# Patient Record
Sex: MALE | Race: WHITE | Employment: UNEMPLOYED | URBAN - METROPOLITAN AREA
[De-identification: names, ages, dates, MRNs, and addresses within clinical notes are randomized per-mention and may not be internally consistent; named-entity substitution may affect disease eponyms.]

---

## 2017-01-20 ENCOUNTER — HOSPITAL ENCOUNTER (OUTPATIENT)
Dept: GENERAL RADIOLOGY | Age: 67
Discharge: HOME | End: 2017-01-20
Payer: MEDICARE

## 2017-01-20 DIAGNOSIS — N20.0 RENAL CALCULUS: ICD-10-CM

## 2017-01-20 PROCEDURE — 74000 XR ABD (KUB): CPT

## 2017-02-06 ENCOUNTER — ANESTHESIA EVENT (OUTPATIENT)
Dept: SURGERY | Age: 67
End: 2017-02-06
Payer: MEDICARE

## 2017-02-07 ENCOUNTER — APPOINTMENT (OUTPATIENT)
Dept: GENERAL RADIOLOGY | Age: 67
End: 2017-02-07
Attending: UROLOGY
Payer: MEDICARE

## 2017-02-07 ENCOUNTER — ANESTHESIA (OUTPATIENT)
Dept: SURGERY | Age: 67
End: 2017-02-07
Payer: MEDICARE

## 2017-02-07 ENCOUNTER — HOSPITAL ENCOUNTER (OUTPATIENT)
Age: 67
Setting detail: OUTPATIENT SURGERY
Discharge: HOME | End: 2017-02-07
Attending: UROLOGY | Admitting: UROLOGY
Payer: MEDICARE

## 2017-02-07 VITALS
DIASTOLIC BLOOD PRESSURE: 76 MMHG | RESPIRATION RATE: 11 BRPM | WEIGHT: 152 LBS | OXYGEN SATURATION: 100 % | HEIGHT: 72 IN | SYSTOLIC BLOOD PRESSURE: 148 MMHG | BODY MASS INDEX: 20.59 KG/M2 | HEART RATE: 51 BPM | TEMPERATURE: 97.5 F

## 2017-02-07 LAB
ATRIAL RATE: 75 BPM
CALCULATED P AXIS, ECG09: -2 DEGREES
CALCULATED R AXIS, ECG10: 33 DEGREES
CALCULATED T AXIS, ECG11: 32 DEGREES
DAILY QC (YES/NO)?: NORMAL
DIAGNOSIS, 93000: NORMAL
HGB BLD-MCNC: 13.2 G/DL (ref 12.1–17)
P-R INTERVAL, ECG05: 132 MS
Q-T INTERVAL, ECG07: 394 MS
QRS DURATION, ECG06: 82 MS
QTC CALCULATION (BEZET), ECG08: 439 MS
VENTRICULAR RATE, ECG03: 75 BPM

## 2017-02-07 PROCEDURE — 77030005520 HC CATH URETH FOL38 BARD -A: Performed by: UROLOGY

## 2017-02-07 PROCEDURE — 76210000016 HC OR PH I REC 1 TO 1.5 HR: Performed by: UROLOGY

## 2017-02-07 PROCEDURE — 74011000258 HC RX REV CODE- 258

## 2017-02-07 PROCEDURE — 74011250637 HC RX REV CODE- 250/637: Performed by: UROLOGY

## 2017-02-07 PROCEDURE — 74011250637 HC RX REV CODE- 250/637: Performed by: ANESTHESIOLOGY

## 2017-02-07 PROCEDURE — 76060000033 HC ANESTHESIA 1 TO 1.5 HR: Performed by: UROLOGY

## 2017-02-07 PROCEDURE — 77030018836 HC SOL IRR NACL ICUM -A: Performed by: UROLOGY

## 2017-02-07 PROCEDURE — 77030018832 HC SOL IRR H20 ICUM -A: Performed by: UROLOGY

## 2017-02-07 PROCEDURE — 76010000161 HC OR TIME 1 TO 1.5 HR INTENSV-TIER 1: Performed by: UROLOGY

## 2017-02-07 PROCEDURE — 77030020143 HC AIRWY LARYN INTUB CGAS -A: Performed by: NURSE ANESTHETIST, CERTIFIED REGISTERED

## 2017-02-07 PROCEDURE — 74011250636 HC RX REV CODE- 250/636

## 2017-02-07 PROCEDURE — 77030010539 HC BG LEG URIN BARD -A: Performed by: UROLOGY

## 2017-02-07 PROCEDURE — 77030020782 HC GWN BAIR PAWS FLX 3M -B

## 2017-02-07 PROCEDURE — 77030019948 HC FBR LSR HOLM DISP OCOA -E: Performed by: UROLOGY

## 2017-02-07 PROCEDURE — 74011250636 HC RX REV CODE- 250/636: Performed by: ANESTHESIOLOGY

## 2017-02-07 PROCEDURE — 74011250636 HC RX REV CODE- 250/636: Performed by: UROLOGY

## 2017-02-07 PROCEDURE — 77030019927 HC TBNG IRR CYSTO BAXT -A: Performed by: UROLOGY

## 2017-02-07 PROCEDURE — 74011000258 HC RX REV CODE- 258: Performed by: UROLOGY

## 2017-02-07 PROCEDURE — 93005 ELECTROCARDIOGRAM TRACING: CPT

## 2017-02-07 PROCEDURE — 85018 HEMOGLOBIN: CPT | Performed by: UROLOGY

## 2017-02-07 PROCEDURE — 76210000020 HC REC RM PH II FIRST 0.5 HR: Performed by: UROLOGY

## 2017-02-07 RX ORDER — OXYCODONE AND ACETAMINOPHEN 5; 325 MG/1; MG/1
1-2 TABLET ORAL
Qty: 40 TAB | Refills: 0 | Status: SHIPPED | OUTPATIENT
Start: 2017-02-07 | End: 2018-10-23

## 2017-02-07 RX ORDER — FENTANYL CITRATE 50 UG/ML
50 INJECTION, SOLUTION INTRAMUSCULAR; INTRAVENOUS AS NEEDED
Status: DISCONTINUED | OUTPATIENT
Start: 2017-02-07 | End: 2017-02-07 | Stop reason: HOSPADM

## 2017-02-07 RX ORDER — HYDROMORPHONE HYDROCHLORIDE 1 MG/ML
1 INJECTION, SOLUTION INTRAMUSCULAR; INTRAVENOUS; SUBCUTANEOUS
Status: DISCONTINUED | OUTPATIENT
Start: 2017-02-07 | End: 2017-02-07 | Stop reason: HOSPADM

## 2017-02-07 RX ORDER — ONDANSETRON 2 MG/ML
INJECTION INTRAMUSCULAR; INTRAVENOUS AS NEEDED
Status: DISCONTINUED | OUTPATIENT
Start: 2017-02-07 | End: 2017-02-07 | Stop reason: HOSPADM

## 2017-02-07 RX ORDER — MIDAZOLAM HYDROCHLORIDE 1 MG/ML
1 INJECTION, SOLUTION INTRAMUSCULAR; INTRAVENOUS AS NEEDED
Status: DISCONTINUED | OUTPATIENT
Start: 2017-02-07 | End: 2017-02-07 | Stop reason: HOSPADM

## 2017-02-07 RX ORDER — ALBUTEROL SULFATE 0.83 MG/ML
2.5 SOLUTION RESPIRATORY (INHALATION) AS NEEDED
Status: DISCONTINUED | OUTPATIENT
Start: 2017-02-07 | End: 2017-02-07 | Stop reason: HOSPADM

## 2017-02-07 RX ORDER — PROMETHAZINE HYDROCHLORIDE 12.5 MG/1
25 TABLET ORAL
Qty: 10 TAB | Refills: 0 | Status: SHIPPED | OUTPATIENT
Start: 2017-02-07 | End: 2017-02-17

## 2017-02-07 RX ORDER — SODIUM CHLORIDE, SODIUM LACTATE, POTASSIUM CHLORIDE, CALCIUM CHLORIDE 600; 310; 30; 20 MG/100ML; MG/100ML; MG/100ML; MG/100ML
25 INJECTION, SOLUTION INTRAVENOUS CONTINUOUS
Status: DISCONTINUED | OUTPATIENT
Start: 2017-02-07 | End: 2017-02-07 | Stop reason: HOSPADM

## 2017-02-07 RX ORDER — PROPOFOL 10 MG/ML
INJECTION, EMULSION INTRAVENOUS AS NEEDED
Status: DISCONTINUED | OUTPATIENT
Start: 2017-02-07 | End: 2017-02-07 | Stop reason: HOSPADM

## 2017-02-07 RX ORDER — SODIUM CHLORIDE, SODIUM LACTATE, POTASSIUM CHLORIDE, CALCIUM CHLORIDE 600; 310; 30; 20 MG/100ML; MG/100ML; MG/100ML; MG/100ML
100 INJECTION, SOLUTION INTRAVENOUS CONTINUOUS
Status: DISCONTINUED | OUTPATIENT
Start: 2017-02-07 | End: 2017-02-07 | Stop reason: HOSPADM

## 2017-02-07 RX ORDER — MIDAZOLAM HYDROCHLORIDE 1 MG/ML
INJECTION, SOLUTION INTRAMUSCULAR; INTRAVENOUS AS NEEDED
Status: DISCONTINUED | OUTPATIENT
Start: 2017-02-07 | End: 2017-02-07 | Stop reason: HOSPADM

## 2017-02-07 RX ORDER — HYDROMORPHONE HYDROCHLORIDE 1 MG/ML
.2-1 INJECTION, SOLUTION INTRAMUSCULAR; INTRAVENOUS; SUBCUTANEOUS
Status: DISCONTINUED | OUTPATIENT
Start: 2017-02-07 | End: 2017-02-07 | Stop reason: HOSPADM

## 2017-02-07 RX ORDER — SODIUM CHLORIDE, SODIUM LACTATE, POTASSIUM CHLORIDE, CALCIUM CHLORIDE 600; 310; 30; 20 MG/100ML; MG/100ML; MG/100ML; MG/100ML
125 INJECTION, SOLUTION INTRAVENOUS CONTINUOUS
Status: DISCONTINUED | OUTPATIENT
Start: 2017-02-07 | End: 2017-02-07 | Stop reason: HOSPADM

## 2017-02-07 RX ORDER — PHENAZOPYRIDINE HYDROCHLORIDE 100 MG/1
100 TABLET, FILM COATED ORAL
Qty: 30 TAB | Refills: 1 | Status: SHIPPED | OUTPATIENT
Start: 2017-02-07 | End: 2017-02-17

## 2017-02-07 RX ORDER — SODIUM CHLORIDE 0.9 % (FLUSH) 0.9 %
5-10 SYRINGE (ML) INJECTION AS NEEDED
Status: DISCONTINUED | OUTPATIENT
Start: 2017-02-07 | End: 2017-02-07 | Stop reason: HOSPADM

## 2017-02-07 RX ORDER — CIPROFLOXACIN 500 MG/1
500 TABLET ORAL 2 TIMES DAILY
Qty: 10 TAB | Refills: 0 | Status: SHIPPED | OUTPATIENT
Start: 2017-02-07 | End: 2017-02-17

## 2017-02-07 RX ORDER — BACLOFEN 10 MG/1
10 TABLET ORAL 3 TIMES DAILY
COMMUNITY
End: 2018-10-23 | Stop reason: SDUPTHER

## 2017-02-07 RX ORDER — FENTANYL CITRATE 50 UG/ML
INJECTION, SOLUTION INTRAMUSCULAR; INTRAVENOUS AS NEEDED
Status: DISCONTINUED | OUTPATIENT
Start: 2017-02-07 | End: 2017-02-07 | Stop reason: HOSPADM

## 2017-02-07 RX ORDER — HYDROCODONE BITARTRATE AND ACETAMINOPHEN 5; 325 MG/1; MG/1
1 TABLET ORAL AS NEEDED
Status: DISCONTINUED | OUTPATIENT
Start: 2017-02-07 | End: 2017-02-07 | Stop reason: HOSPADM

## 2017-02-07 RX ORDER — ONDANSETRON 2 MG/ML
4 INJECTION INTRAMUSCULAR; INTRAVENOUS AS NEEDED
Status: DISCONTINUED | OUTPATIENT
Start: 2017-02-07 | End: 2017-02-07 | Stop reason: HOSPADM

## 2017-02-07 RX ORDER — DIPHENHYDRAMINE HYDROCHLORIDE 50 MG/ML
12.5 INJECTION, SOLUTION INTRAMUSCULAR; INTRAVENOUS AS NEEDED
Status: DISCONTINUED | OUTPATIENT
Start: 2017-02-07 | End: 2017-02-07 | Stop reason: HOSPADM

## 2017-02-07 RX ORDER — LIDOCAINE HYDROCHLORIDE 10 MG/ML
0.1 INJECTION, SOLUTION EPIDURAL; INFILTRATION; INTRACAUDAL; PERINEURAL AS NEEDED
Status: DISCONTINUED | OUTPATIENT
Start: 2017-02-07 | End: 2017-02-07 | Stop reason: HOSPADM

## 2017-02-07 RX ORDER — PHENAZOPYRIDINE HYDROCHLORIDE 100 MG/1
100 TABLET, FILM COATED ORAL ONCE
Status: COMPLETED | OUTPATIENT
Start: 2017-02-07 | End: 2017-02-07

## 2017-02-07 RX ORDER — GENTAMICIN SULFATE 80 MG/100ML
INJECTION, SOLUTION INTRAVENOUS AS NEEDED
Status: DISCONTINUED | OUTPATIENT
Start: 2017-02-07 | End: 2017-02-07 | Stop reason: HOSPADM

## 2017-02-07 RX ORDER — SODIUM CHLORIDE, SODIUM LACTATE, POTASSIUM CHLORIDE, CALCIUM CHLORIDE 600; 310; 30; 20 MG/100ML; MG/100ML; MG/100ML; MG/100ML
150 INJECTION, SOLUTION INTRAVENOUS CONTINUOUS
Status: DISCONTINUED | OUTPATIENT
Start: 2017-02-07 | End: 2017-02-07 | Stop reason: HOSPADM

## 2017-02-07 RX ADMIN — CEFTRIAXONE 1 G: 1 INJECTION, POWDER, FOR SOLUTION INTRAMUSCULAR; INTRAVENOUS at 14:19

## 2017-02-07 RX ADMIN — ONDANSETRON 4 MG: 2 INJECTION INTRAMUSCULAR; INTRAVENOUS at 14:43

## 2017-02-07 RX ADMIN — SODIUM CHLORIDE, POTASSIUM CHLORIDE, SODIUM LACTATE AND CALCIUM CHLORIDE: 600; 310; 30; 20 INJECTION, SOLUTION INTRAVENOUS at 13:27

## 2017-02-07 RX ADMIN — MIDAZOLAM HYDROCHLORIDE 2 MG: 1 INJECTION, SOLUTION INTRAMUSCULAR; INTRAVENOUS at 13:58

## 2017-02-07 RX ADMIN — FENTANYL CITRATE 25 MCG: 50 INJECTION, SOLUTION INTRAMUSCULAR; INTRAVENOUS at 14:36

## 2017-02-07 RX ADMIN — FENTANYL CITRATE 25 MCG: 50 INJECTION, SOLUTION INTRAMUSCULAR; INTRAVENOUS at 14:21

## 2017-02-07 RX ADMIN — GENTAMICIN SULFATE 80 MG: 80 INJECTION, SOLUTION INTRAVENOUS at 14:31

## 2017-02-07 RX ADMIN — FENTANYL CITRATE 25 MCG: 50 INJECTION, SOLUTION INTRAMUSCULAR; INTRAVENOUS at 13:58

## 2017-02-07 RX ADMIN — PHENAZOPYRIDINE HYDROCHLORIDE 100 MG: 100 TABLET ORAL at 15:50

## 2017-02-07 RX ADMIN — HYDROCODONE BITARTRATE AND ACETAMINOPHEN 1 TABLET: 5; 325 TABLET ORAL at 15:50

## 2017-02-07 RX ADMIN — PROPOFOL 130 MG: 10 INJECTION, EMULSION INTRAVENOUS at 14:06

## 2017-02-07 RX ADMIN — SODIUM CHLORIDE, POTASSIUM CHLORIDE, SODIUM LACTATE AND CALCIUM CHLORIDE: 600; 310; 30; 20 INJECTION, SOLUTION INTRAVENOUS at 14:20

## 2017-02-07 NOTE — ANESTHESIA PREPROCEDURE EVALUATION
Anesthetic History   No history of anesthetic complications       Comments: H/o trach, decannulated in 1978, denies airway issues with any anesthetics     Review of Systems / Medical History  Patient summary reviewed, nursing notes reviewed and pertinent labs reviewed    Pulmonary  Within defined limits                 Neuro/Psych         Neuromuscular disease    Comments: Quadriplegia w/ full motor deficit in all extremities, sensation intact. Able to ventilate without support or O2 Cardiovascular                Pertinent negatives: No past MI and cardiac stents  Exercise tolerance: <4 METS  Comments: No cardiac history, EKG NSR   GI/Hepatic/Renal             Pertinent negatives: No GERD   Endo/Other             Other Findings   Comments: H/o nephrolithiasis           Physical Exam    Airway  Mallampati: III  TM Distance: 4 - 6 cm  Neck ROM: normal range of motion   Mouth opening: Normal    Comments: Retrognathic, appears anterior Cardiovascular    Rhythm: regular  Rate: normal      Pertinent negatives: No murmur   Dental    Dentition: Bridges  Comments: Upper bridge   Pulmonary  Breath sounds clear to auscultation               Abdominal  GI exam deferred       Other Findings            Anesthetic Plan    ASA: 3  Anesthesia type: general          Induction: Intravenous  Anesthetic plan and risks discussed with: Patient and Family      R/b/o of GA discussed with pt & family. ?s answered, Consent signed. Difficult PIV access. OK to proceed w/ Hemeaccue value only.  Edgar Patricia MD

## 2017-02-07 NOTE — BRIEF OP NOTE
BRIEF OPERATIVE NOTE    Date of Procedure: 2/7/2017   Preoperative Diagnosis: BLADDER STONE   Postoperative Diagnosis: BLADDER STONE     Procedure(s):  CYSTOSCOPY LITHOLAPAXY WITH HOLMIUM LASER   Surgeon(s) and Role:     * Johanne Patten MD - Primary            Surgical Staff:  Circ-1: Jamila Sosa RN  Scrub Tech-1: Towner Rotunda  Event Time In   Incision Start 1432   Incision Close 1446     Anesthesia: General   Estimated Blood Loss: < 50cc  Specimens: * No specimens in log *   Findings: 4X5 cm bladder stone   Complications: none  Implants: * No implants in log *

## 2017-02-07 NOTE — INTERVAL H&P NOTE
H&P Update:  Joann Colunga was seen and examined. History and physical has been reviewed. The patient has been examined.  There have been no significant clinical changes since the completion of the originally dated History and Physical.    Signed By: Haris Aviles MD     February 7, 2017 1:26 PM
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
## 2017-02-07 NOTE — ANESTHESIA POSTPROCEDURE EVALUATION
Post-Anesthesia Evaluation and Assessment    Patient: Tate Sanchez MRN: [id number]  SSN: [id number]    YOB: 1950  Age: 61 y.o. Sex: male       Cardiovascular Function/Vital Signs  Visit Vitals    /83    Pulse (!) 56    Temp 36.5 °C (97.7 °F)    Resp 15    Ht 6' (1.829 m)    Wt 68.9 kg (152 lb)    SpO2 100%    BMI 20.61 kg/m2       Patient is status post general anesthesia for Procedure(s):  CYSTOSCOPY LITHOLAPAXY WITH HOLMIUM LASER . Nausea/Vomiting: None    Postoperative hydration reviewed and adequate. Pain:  Pain Scale 1: Numeric (0 - 10) (02/07/17 0948)  Pain Intensity 1: 0 (02/07/17 0948)   Managed    Neurological Status:   Neuro (WDL): Exceptions to WDL (02/07/17 4022)  Neuro  Neurologic State: Alert (02/07/17 5235)  Orientation Level: Appropriate for age (02/07/17 2263)  Speech: Mouths words (02/07/17 0958)  LUE Motor Response: Tremorous (02/07/17 0958)  RUE Motor Response: Tremorous (02/07/17 0958)   At baseline    Mental Status and Level of Consciousness: Alert and oriented     Pulmonary Status:   O2 Device: Nasal cannula (02/07/17 1510)   Adequate oxygenation and airway patent    Complications related to anesthesia: None    Post-anesthesia assessment completed.  No concerns    Signed By: Reji Soliman DO     February 7, 2017
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
## 2017-02-07 NOTE — PROGRESS NOTES
Anesthesia started PIV x1 attempt, left upper arm #20. IV site became infiltrated, PIV D/C'd, warm compress applied, radial pulse palpable.

## 2017-02-07 NOTE — PERIOP NOTES
Post op discharge instructions reviewed and given to  wife. Verbalized understanding and compliance. Wife advised pt given dose of pyridium and lortab. Made aware urine will be discolored orange from medication (pyridium).

## 2017-02-07 NOTE — IP AVS SNAPSHOT
Shannon Glez [address] [facility] 
[phone] Patient: Hosea Jernigan MRN: [id number] :1950 You are allergic to the following No active allergies Recent Documentation Height Weight BMI Smoking Status 1.829 m 68.9 kg 20.61 kg/m2 Former Smoker Unresulted Labs Order Current Status POC HEMOGLOBIN Preliminary result Emergency Contacts Name Discharge Info Relation Home Work Mobile Marisela Saunders DISCHARGE CAREGIVER [3] Spouse [3] [phone] Luann Vegaecca DISCHARGE CAREGIVER [3] Daughter [21] [phone] About your hospitalization You were admitted on:  2017 You last received care in the:  [facility] PACU You were discharged on:  2017 Unit phone number:  [phone] Why you were hospitalized Your primary diagnosis was:  Not on File Providers Seen During Your Hospitalizations Provider Role Specialty Primary office phone Rene Sanches MD Attending Provider Urology [phone] Your Primary Care Physician (PCP) Primary Care Physician Office Phone Office Fax Carmella Randall [phone] Follow-up Information Follow up With Details Comments Contact Info Margarito Wright MD   [address] 
[phone] Current Discharge Medication List  
  
START taking these medications Dose & Instructions Dispensing Information Comments Morning Noon Evening Bedtime  
 ciprofloxacin HCl 500 mg tablet Commonly known as:  CIPRO Your next dose is: Today, Tomorrow Other:  _________ Dose:  500 mg Take 1 Tab by mouth two (2) times a day for 10 days. Quantity:  10 Tab Refills:  0  
     
   
   
   
  
 citric ac-gluconolact-mag carb 6.602-3.268 gram/100 mL Soln Commonly known as:  RENACIDIN  
 Start taking on:  2/9/2017 Your next dose is: Today, Tomorrow Other:  _________ Dose:  1 Bottle 1 Bottle by Irrigation route every Monday and Thursday for 90 days. Quantity:  24 Bottle Refills:  3  
     
   
   
   
  
 oxyCODONE-acetaminophen 5-325 mg per tablet Commonly known as:  PERCOCET Your next dose is: Today, Tomorrow Other:  _________ Dose:  1-2 Tab Take 1-2 Tabs by mouth every four (4) hours as needed for Pain. Max Daily Amount: 12 Tabs. Quantity:  40 Tab Refills:  0 phenazopyridine 100 mg tablet Commonly known as:  PYRIDIUM Your next dose is: Today, Tomorrow Other:  _________ Dose:  100 mg Take 1 Tab by mouth three (3) times daily as needed (burning) for up to 10 days. Quantity:  30 Tab Refills:  1  
     
   
   
   
  
 promethazine 12.5 mg tablet Commonly known as:  PHENERGAN Your next dose is: Today, Tomorrow Other:  _________ Dose:  25 mg Take 2 Tabs by mouth every six (6) hours as needed for Nausea for up to 10 days. Quantity:  10 Tab Refills:  0 CONTINUE these medications which have NOT CHANGED Dose & Instructions Dispensing Information Comments Morning Noon Evening Bedtime  
 baclofen 10 mg tablet Commonly known as:  LIORESAL Your next dose is: Today, Tomorrow Other:  _________ Dose:  10 mg Take 10 mg by mouth three (3) times daily. Refills:  0  
     
   
   
   
  
 OXYBUTYNIN CHLORIDE PO Your next dose is: Today, Tomorrow Other:  _________ Take  by mouth three (3) times daily (with meals). Refills:  0 Where to Get Your Medications Information on where to get these meds will be given to you by the nurse or doctor. ! Ask your nurse or doctor about these medications  
  ciprofloxacin HCl 500 mg tablet citric ac-gluconolact-mag carb 6.602-3.268 gram/100 mL Soln  
 oxyCODONE-acetaminophen 5-325 mg per tablet  
 phenazopyridine 100 mg tablet  
 promethazine 12.5 mg tablet Discharge Instructions   
  
[place] 
urology [address] [facility] p [phone] 
[facility] [address]  [phone] [facility] Catheter Care ? The catheter draining the bladder has a water-filled balloon inside which prevents it from falling out. The catheter will not fall out on its own. 
? Bleeding may occur from time-to-time as the catheter may scrape along the bladder. It is important to drink enough fluid to keep urine flowing and clear out the catheter. Do not be alarmed by blood in the catheter bag. 
? The bladder may sense that there is something in it giving the feeling of needing to urinate. This may result in contractions of the bladder muscle, bladder spasms.   These spasms may result in: o A pressure sensation in the pelvis / bladder. o Bleeding. o Urine leaking around the side of the catheter. Generally, these issues are fairly minimal and well-tolerated by most patients. If these issues become overwhelming, contact the office during regular hours. Care of the Bag ? The leg bag is secured with two latex straps. To prevent problems with these straps: 
o Make sure that they are not too tight. 
o Switch legs each day. 
o The straps should go behind rather than over the bag to allow urine to flow into the bag without obstruction. ? The catheter should not be pulled on. As the bag fills, its weight will pull on the bag. To prevent this, it is best to keep the bag over the knee. The catheter should be loose, but not kinked. ? You may change the leg bag to a gravity bag at night. This is a bag with longer tubing that allows it to hang over the side of the bed at night. Be careful to make sure the bag does not pull on the catheter. ? The bag may be cleaned with water and vinegar as needed. You may shower with the bag. 
 
 
[place] 
urology [address] p [phone] 
[place] [id number], [address]  [phone] PATIENT INSTRUCTIONS: 
 
After general anesthesia or intravenous sedation, for 24 hours or while taking prescription Narcotics: · Limit your activities · Do not drive and operate hazardous machinery · Do not make important personal or business decisions · Do not drink alcoholic beverages Please contact [facility] Urology at [phone] if: · Temperature over 101 · Vomiting / Unable to tolerate liquids · Severe pain not responsive to pain medications · Unable to urinate for 6-8 hours DISCHARGE SUMMARY from your Nurse The following personal items collected during your admission are returned to you:  
Dental Appliance: Dental Appliances: None Vision: Visual Aid: None Hearing Aid:   
Jewelry: Jewelry: Ring, Sent home (given to wife) Clothing: Clothing: Other (comment) (street clothes, to locker) Other Valuables: Other Valuables: Wheelchair (to locker) Valuables sent to safe:   
 
PATIENT INSTRUCTIONS: 
 
After general anesthesia or intravenous sedation, for 24 hours or while taking prescription Narcotics: · Limit your activities · Do not drive and operate hazardous machinery · Do not make important personal or business decisions · Do  not drink alcoholic beverages · If you have not urinated within 8 hours after discharge, please contact your surgeon on call. Report the following to your surgeon: 
· Excessive pain, swelling, redness or odor of or around the surgical area · Temperature over 100.5 · Nausea and vomiting lasting longer than 4 hours or if unable to take medications · Any signs of decreased circulation or nerve impairment to extremity: change in color, persistent  numbness, tingling, coldness or increase pain · Any questions [address] [facility] Breathing deep and coughing are very important exercises to do after surgery. Deep breathing and coughing open the little air tubes and air sacks in your lungs. You take deep breaths every day. You may not even notice - it is just something you do when you sigh or yawn. It is a natural exercise you do to keep these air passages open. After surgery, take deep breaths and cough, on purpose. Coughing and deep breathing help prevent bronchitis and pneumonia after surgery. If you had chest or belly surgery, use a pillow as a \"hug nallely\" and hold it tightly to your chest or belly when you cough. DIRECTIONS: 
10. Take 10 to 15 slow deep breaths every hour while awake. 11. Breathe in deeply, and hold it for 2 seconds. 12. Exhale slowly through puckered lips, like blowing up a balloon. 13. After every 4th or 5th deep breath, hug your pillow to your chest or belly and give a hard, deep cough. Yes, it will probably hurt. But doing this exercise is very important part of healing after surgery. Take your pain medicine to help you do this exercise without too much pain. IF YOU HAVE BEEN DIAGNOSED WITH SLEEP APNEA, PLEASE USE YOUR SLEEIFP APNEA DEVICE OR CPAP MACHINE WHEN YOU INTEND TO NAP AFTER TAKING PAIN MEDICATION. Ankle Pumps Ankle pumps increase the circulation of oxygenated blood to your lower extremities and decrease your risk for circulation problems such as blood clots. They also stretch the muscles, tendons and ligaments in your foot and ankle, and prevent joint contracture in the ankle and foot, especially after surgeries on the legs. It is important to do ankle pump exercises regularly after surgery because immobility increases your risk for developing a blood clot. Your doctor may also have you take an Aspirin for the next few days as well. If your doctor did not ask you to take an Aspirin, consult with him before starting Aspirin therapy on your own. Slowly point your foot forward, feeling the muscles on the top of your lower leg stretch, and hold this position for 5 seconds. Next, pull your foot back toward you as far as possible, stretching the calf muscles, and hold that position for 5 seconds. Repeat with the other foot. Perform 10 repetitions every hour while awake for both ankles if possible (down and then up with the foot once is one repetition). You should feel gentle stretching of the muscles in your lower leg when doing this exercise. If you feel pain, or your range of motion is limited, don't  Push too hard. Only go the limit your joint and muscles will let you go. If you have increasing pain, progressively worsening leg warmth or swelling, STOP the exercise and call your doctor. Below is information about the medications your doctor is prescribing after your visit: 
 
Other information in your discharge envelope: PRESCRIPTIONS PHYSICAL THERAPY PRESCRIPTION 
     APPOINTMENT CARDS [facility] Pamphlet for block or block with On-Q Catheter from [facility] Medical device information sheets/pamphlets from their  School/work excuse note. /parent work excuse note. These are general instructions for a healthy lifestyle: *  Please give a list of your current medications to your Primary Care Provider. *  Please update this list whenever your medications are discontinued, doses are 
    changed, or new medications (including over-the-counter products) are added. *  Please carry medication information at all times in case of emergency situations. About Smoking No smoking / No tobacco products / Avoid exposure to second hand smoke Surgeon General's Warning:  Quitting smoking now greatly reduces serious risk to your health.  
 
Obesity, smoking, and sedentary lifestyle greatly increases your risk for illness and disease. A healthy diet, regular physical exercise & weight monitoring are important for maintaining a healthy lifestyle. Congestive Heart Failure You may be retaining fluid if you have a history of heart failure or if you experience any of the following symptoms:  Weight gain of 3 pounds or more overnight or 5 pounds in a week, increased swelling in our hands or feet or shortness of breath while lying flat in bed. Please call your doctor as soon as you notice any of these symptoms; do not wait until your next office visit. Recognize signs and symptoms of STROKE: 
F - face looks uneven A - arms unable to move or move even S - speech slurred or non-existent T - time-call 911 as soon as signs and symptoms begin-DO NOT go Back to bed or wait to see if you get better-TIME IS BRAIN. Warning signs of HEART ATTACK Call 911 if you have these symptoms · Chest discomfort. Most heart attacks involve discomfort in the center of the chest that lasts more than a few minutes, or that goes away and comes back. It can feel like uncomfortable pressure, squeezing, fullness, or pain. · Discomfort in other areas of the upper body. Symptoms can include pain or discomfort in one or both Arms, the back, neck, jaw, or stomach. ·  Shortness of breath with or without chest discomfort. · Other signs may include breaking out in a cold sweat, nausea, or lightheadedness Don't wait more than five minutes to call 911 - MINUTES MATTER! Fast action can save your life. Calling 911 is almost always the fastest way to get lifesaving treatment. Emergency Medical Services staff can begin treatment when they arrive - up to an hour sooner than if someone gets to the hospital by car. [facility] MEDICATION AND SIDE EFFECT GUIDE The [address] [facility] EFFECT GUIDE was provided to the PATIENT AND CARE PROVIDER.   Information provided includes instruction about drug purpose and common side effects for the following medications: · Discharge Orders None General Information Please provide this summary of care documentation to your next provider. Patient Signature:  ____________________________________________________________ Date:  ____________________________________________________________  
  
Cory Naas Provider Signature:  ____________________________________________________________ Date:  ____________________________________________________________

## 2017-02-07 NOTE — DISCHARGE INSTRUCTIONS
[place]  urology  [phone] [employer] p [phone]  [place] [address]  f  [phone]    [facility] Catheter Care   The catheter draining the bladder has a water-filled balloon inside which prevents it from falling out. The catheter will not fall out on its own.  Bleeding may occur from time-to-time as the catheter may scrape along the bladder. It is important to drink enough fluid to keep urine flowing and clear out the catheter. Do not be alarmed by blood in the catheter bag.  The bladder may sense that there is something in it giving the feeling of needing to urinate. This may result in contractions of the bladder muscle, bladder spasms.   These spasms may result in:  o A pressure sensation in the pelvis / bladder. o Bleeding. o Urine leaking around the side of the catheter. Generally, these issues are fairly minimal and well-tolerated by most patients. If these issues become overwhelming, contact the office during regular hours. Care of the Bag   The leg bag is secured with two latex straps. To prevent problems with these straps:  o Make sure that they are not too tight.  o Switch legs each day.  o The straps should go behind rather than over the bag to allow urine to flow into the bag without obstruction.  The catheter should not be pulled on. As the bag fills, its weight will pull on the bag. To prevent this, it is best to keep the bag over the knee. The catheter should be loose, but not kinked.  You may change the leg bag to a gravity bag at night. This is a bag with longer tubing that allows it to hang over the side of the bed at night. Be careful to make sure the bag does not pull on the catheter.  The bag may be cleaned with water and vinegar as needed.   You may shower with the bag.      [facility]  [address] [facility] [address] [phone]  [place] [address]  f  [phone]    PATIENT INSTRUCTIONS:    After general anesthesia or intravenous sedation, for 24 hours or while taking prescription Narcotics:  · Limit your activities  · Do not drive and operate hazardous machinery  · Do not make important personal or business decisions  · Do not drink alcoholic beverages      Please contact [address] [facility] Urology at [phone] if:  · Temperature over 101  · Vomiting / Unable to tolerate liquids  · Severe pain not responsive to pain medications  · Unable to urinate for 6-8 hours    DISCHARGE SUMMARY from your Nurse    The following personal items collected during your admission are returned to you:   Dental Appliance: Dental Appliances: None  Vision: Visual Aid: None  Hearing Aid:    Jewelry: Jewelry: Ring, Sent home (given to wife)  Clothing: Clothing: Other (comment) (street clothes, to locker)  Other Valuables: Other Valuables: Wheelchair (to locker)  Valuables sent to safe:      PATIENT INSTRUCTIONS:    After general anesthesia or intravenous sedation, for 24 hours or while taking prescription Narcotics:  · Limit your activities  · Do not drive and operate hazardous machinery  · Do not make important personal or business decisions  · Do  not drink alcoholic beverages  · If you have not urinated within 8 hours after discharge, please contact your surgeon on call. Report the following to your surgeon:  · Excessive pain, swelling, redness or odor of or around the surgical area  · Temperature over 100.5  · Nausea and vomiting lasting longer than 4 hours or if unable to take medications  · Any signs of decreased circulation or nerve impairment to extremity: change in color, persistent  numbness, tingling, coldness or increase pain  · Any questions    COUGH AND DEEP BREATHE    Breathing deep and coughing are very important exercises to do after surgery. Deep breathing and coughing open the little air tubes and air sacks in your lungs. You take deep breaths every day. You may not even notice - it is just something you do when you sigh or yawn.   It is a natural exercise you do to keep these air passages open. After surgery, take deep breaths and cough, on purpose. Coughing and deep breathing help prevent bronchitis and pneumonia after surgery. If you had chest or belly surgery, use a pillow as a \"hug nallely\" and hold it tightly to your chest or belly when you cough. DIRECTIONS:  10. Take 10 to 15 slow deep breaths every hour while awake. 11. Breathe in deeply, and hold it for 2 seconds. 12. Exhale slowly through puckered lips, like blowing up a balloon. 13. After every 4th or 5th deep breath, hug your pillow to your chest or belly and give a hard, deep cough. Yes, it will probably hurt. But doing this exercise is very important part of healing after surgery. Take your pain medicine to help you do this exercise without too much pain. IF YOU HAVE BEEN DIAGNOSED WITH SLEEP APNEA, PLEASE USE YOUR SLEEIFP APNEA DEVICE OR CPAP MACHINE WHEN YOU INTEND TO NAP AFTER TAKING PAIN MEDICATION. Ankle Pumps    Ankle pumps increase the circulation of oxygenated blood to your lower extremities and decrease your risk for circulation problems such as blood clots. They also stretch the muscles, tendons and ligaments in your foot and ankle, and prevent joint contracture in the ankle and foot, especially after surgeries on the legs. It is important to do ankle pump exercises regularly after surgery because immobility increases your risk for developing a blood clot. Your doctor may also have you take an Aspirin for the next few days as well. If your doctor did not ask you to take an Aspirin, consult with him before starting Aspirin therapy on your own. Slowly point your foot forward, feeling the muscles on the top of your lower leg stretch, and hold this position for 5 seconds. Next, pull your foot back toward you as far as possible, stretching the calf muscles, and hold that position for 5 seconds.                  Repeat with the other foot.  Perform 10 repetitions every hour while awake for both ankles if possible (down and then up with the foot once is one repetition). You should feel gentle stretching of the muscles in your lower leg when doing this exercise. If you feel pain, or your range of motion is limited, don't  Push too hard. Only go the limit your joint and muscles will let you go. If you have increasing pain, progressively worsening leg warmth or swelling, STOP the exercise and call your doctor. Below is information about the medications your doctor is prescribing after your visit:    Other information in your discharge envelope:  []     PRESCRIPTIONS  []     PHYSICAL THERAPY PRESCRIPTION  []     APPOINTMENT CARDS  []     [facility] Pamphlet for block or block with On-Q Catheter from [facility]  []     Medical device information sheets/pamphlets from their    []     School/work excuse note. []     /parent work excuse note. These are general instructions for a healthy lifestyle:    *  Please give a list of your current medications to your Primary Care Provider. *  Please update this list whenever your medications are discontinued, doses are      changed, or new medications (including over-the-counter products) are added. *  Please carry medication information at all times in case of emergency situations. About Smoking  No smoking / No tobacco products / Avoid exposure to second hand smoke    Surgeon General's Warning:  Quitting smoking now greatly reduces serious risk to your health. Obesity, smoking, and sedentary lifestyle greatly increases your risk for illness and disease. A healthy diet, regular physical exercise & weight monitoring are important for maintaining a healthy lifestyle.     Congestive Heart Failure  You may be retaining fluid if you have a history of heart failure or if you experience any of the following symptoms:  Weight gain of 3 pounds or more overnight or 5 pounds in a week, increased swelling in our hands or feet or shortness of breath while lying flat in bed. Please call your doctor as soon as you notice any of these symptoms; do not wait until your next office visit. Recognize signs and symptoms of STROKE:  F - face looks uneven  A - arms unable to move or move even  S - speech slurred or non-existent  T - time-call 911 as soon as signs and symptoms begin-DO NOT go         Back to bed or wait to see if you get better-TIME IS BRAIN. Warning signs of HEART ATTACK  Call 911 if you have these symptoms    · Chest discomfort. Most heart attacks involve discomfort in the center of the chest that lasts more than a few minutes, or that goes away and comes back. It can feel like uncomfortable pressure, squeezing, fullness, or pain. · Discomfort in other areas of the upper body. Symptoms can include pain or discomfort in one or both        Arms, the back, neck, jaw, or stomach. ·  Shortness of breath with or without chest discomfort. · Other signs may include breaking out in a cold sweat, nausea, or lightheadedness    Don't wait more than five minutes to call 911 - MINUTES MATTER! Fast action can save your life. Calling 911 is almost always the fastest way to get lifesaving treatment. Emergency Medical Services staff can begin treatment when they arrive - up to an hour sooner than if someone gets to the hospital by car. [facility] MEDICATION AND SIDE EFFECT GUIDE    The [employer] MEDICATION AND SIDE EFFECT GUIDE was provided to the PATIENT AND CARE PROVIDER.   Information provided includes instruction about drug purpose and common side effects for the following medications:    ·

## 2018-01-01 ENCOUNTER — HOME CARE VISIT (OUTPATIENT)
Dept: SCHEDULING | Facility: HOME HEALTH | Age: 68
End: 2018-01-01
Payer: MEDICARE

## 2018-01-01 ENCOUNTER — NURSE NAVIGATOR (OUTPATIENT)
Dept: FAMILY MEDICINE CLINIC | Age: 68
End: 2018-01-01

## 2018-01-01 ENCOUNTER — TELEPHONE (OUTPATIENT)
Dept: INTERNAL MEDICINE CLINIC | Age: 68
End: 2018-01-01

## 2018-01-01 VITALS
HEART RATE: 77 BPM | TEMPERATURE: 98.3 F | SYSTOLIC BLOOD PRESSURE: 118 MMHG | OXYGEN SATURATION: 98 % | RESPIRATION RATE: 20 BRPM | DIASTOLIC BLOOD PRESSURE: 66 MMHG

## 2018-01-01 VITALS
RESPIRATION RATE: 20 BRPM | HEART RATE: 66 BPM | OXYGEN SATURATION: 98 % | SYSTOLIC BLOOD PRESSURE: 108 MMHG | TEMPERATURE: 98.3 F | DIASTOLIC BLOOD PRESSURE: 64 MMHG

## 2018-01-01 VITALS
RESPIRATION RATE: 18 BRPM | DIASTOLIC BLOOD PRESSURE: 78 MMHG | SYSTOLIC BLOOD PRESSURE: 116 MMHG | OXYGEN SATURATION: 98 % | HEART RATE: 86 BPM | TEMPERATURE: 97.8 F

## 2018-01-01 VITALS
RESPIRATION RATE: 20 BRPM | OXYGEN SATURATION: 99 % | DIASTOLIC BLOOD PRESSURE: 67 MMHG | TEMPERATURE: 98.4 F | SYSTOLIC BLOOD PRESSURE: 108 MMHG | HEART RATE: 78 BPM

## 2018-01-01 DIAGNOSIS — N20.0 NEPHROLITHIASIS: ICD-10-CM

## 2018-01-01 DIAGNOSIS — G82.50 QUADRIPLEGIA (HCC): Primary | ICD-10-CM

## 2018-01-01 PROCEDURE — 3331090001 HH PPS REVENUE CREDIT

## 2018-01-01 PROCEDURE — 3331090002 HH PPS REVENUE DEBIT

## 2018-01-01 PROCEDURE — 400014 HH F/U

## 2018-01-01 PROCEDURE — G0299 HHS/HOSPICE OF RN EA 15 MIN: HCPCS

## 2018-01-01 PROCEDURE — A6216 NON-STERILE GAUZE<=16 SQ IN: HCPCS

## 2018-01-01 PROCEDURE — A6250 SKIN SEAL PROTECT MOISTURIZR: HCPCS

## 2018-01-01 PROCEDURE — A4333 URINARY CATH ANCHOR DEVICE: HCPCS

## 2018-01-01 PROCEDURE — A6260 WOUND CLEANSER ANY TYPE/SIZE: HCPCS

## 2018-01-01 PROCEDURE — A6213 FOAM DRG >16<=48 SQ IN W/BDR: HCPCS

## 2018-10-18 ENCOUNTER — TELEPHONE (OUTPATIENT)
Dept: INTERNAL MEDICINE CLINIC | Age: 68
End: 2018-10-18

## 2018-10-18 NOTE — TELEPHONE ENCOUNTER
Patient's daughter Brittany Kerr called to [facility] patient's new patient apt this afternoon w/ Dr Zohreh Doran due to him being a quadriplegic and having a bowel episode . Stated she's not sure how long it will last and then to get him dressed etc would take way too long . Next new patient opening is not until Dec 2018 but she states he cant wait that long to be seen due to having medical issues that needs attention.        Brittany Kerr can be reached at [phone]

## 2018-10-18 NOTE — TELEPHONE ENCOUNTER
Per PCP okay to schedule in the next available 30 min slot. NP appt r/s for Tues Oct 23rd at 1:30. Patient's step daughter agreed to appt & voiced thanks for the call.

## 2018-10-23 ENCOUNTER — OFFICE VISIT (OUTPATIENT)
Dept: INTERNAL MEDICINE CLINIC | Age: 68
End: 2018-10-23

## 2018-10-23 ENCOUNTER — HOSPITAL ENCOUNTER (OUTPATIENT)
Dept: LAB | Age: 68
Discharge: HOME | End: 2018-10-23
Payer: MEDICARE

## 2018-10-23 VITALS
SYSTOLIC BLOOD PRESSURE: 99 MMHG | BODY MASS INDEX: 20.61 KG/M2 | OXYGEN SATURATION: 97 % | DIASTOLIC BLOOD PRESSURE: 67 MMHG | RESPIRATION RATE: 16 BRPM | HEART RATE: 88 BPM | HEIGHT: 72 IN

## 2018-10-23 DIAGNOSIS — Z87.442 HISTORY OF NEPHROLITHIASIS: ICD-10-CM

## 2018-10-23 DIAGNOSIS — G82.50 QUADRIPLEGIA (HCC): ICD-10-CM

## 2018-10-23 DIAGNOSIS — G89.29 CHRONIC PAIN OF LEFT KNEE: ICD-10-CM

## 2018-10-23 DIAGNOSIS — Z23 ENCOUNTER FOR IMMUNIZATION: ICD-10-CM

## 2018-10-23 DIAGNOSIS — Z76.89 ESTABLISHING CARE WITH NEW DOCTOR, ENCOUNTER FOR: Primary | ICD-10-CM

## 2018-10-23 DIAGNOSIS — L89.42 PRESSURE INJURY OF CONTIGUOUS REGION INVOLVING BACK AND BUTTOCK, STAGE 2, UNSPECIFIED LATERALITY (HCC): ICD-10-CM

## 2018-10-23 DIAGNOSIS — M25.562 CHRONIC PAIN OF LEFT KNEE: ICD-10-CM

## 2018-10-23 PROCEDURE — 80053 COMPREHEN METABOLIC PANEL: CPT

## 2018-10-23 PROCEDURE — 36415 COLL VENOUS BLD VENIPUNCTURE: CPT

## 2018-10-23 PROCEDURE — 85025 COMPLETE CBC W/AUTO DIFF WBC: CPT

## 2018-10-23 PROCEDURE — 82306 VITAMIN D 25 HYDROXY: CPT

## 2018-10-23 RX ORDER — ZOLPIDEM TARTRATE 5 MG/1
5 TABLET ORAL
COMMUNITY
End: 2019-01-01

## 2018-10-23 RX ORDER — DICLOFENAC SODIUM 10 MG/G
2 GEL TOPICAL 4 TIMES DAILY
Qty: 100 G | Refills: 5 | Status: SHIPPED | OUTPATIENT
Start: 2018-10-23 | End: 2019-01-01

## 2018-10-23 RX ORDER — OXYBUTYNIN CHLORIDE 5 MG/1
5 TABLET ORAL 3 TIMES DAILY
Qty: 270 TAB | Refills: 1 | Status: SHIPPED | OUTPATIENT
Start: 2018-10-23 | End: 2019-01-01 | Stop reason: SDUPTHER

## 2018-10-23 RX ORDER — BACLOFEN 10 MG/1
10 TABLET ORAL 3 TIMES DAILY
Qty: 270 TAB | Refills: 1 | Status: SHIPPED | OUTPATIENT
Start: 2018-10-23 | End: 2019-01-01 | Stop reason: SDUPTHER
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
## 2018-10-23 NOTE — PROGRESS NOTES
1. Have you been to the ER, urgent care clinic since your last visit? Hospitalized since your last visit? No    2. Have you seen or consulted any other health care providers outside of the [address] [place] [address] since your last visit? Include any pap smears or colon screening. Dr. Clau Lopez with [address]. Dr Neil Cunningham for Neurology.

## 2018-10-23 NOTE — LETTER
10/24/2018 9:59 AM 
 
Mr. Teena Higginbotham [address] [facility] [address] [address] Dear Teena Higginbotham: 
 
Please find your most recent results below. I've reviewed your test results as listed below. Results are normal/stable unless otherwise noted. Kidney Function: NORMAL/STABLE Sodium: NORMAL/STABLE Sugar (glucose): NORMAL/STABLE as this was not a fasting sugar Red Blood Cells: NORMAL/STABLE White Blood Cells: NORMAL/STABLE Platelets: NORMAL/STABLE Liver: NORMAL/STABLE Potassium: NORMAL/STABLE Vitamin D: Mildly low Resulted Orders METABOLIC PANEL, COMPREHENSIVE Result Value Ref Range Glucose 124 (H) 65 - 99 mg/dL Comment:  
   Specimen received hemolyzed. Clinical correlation indicated. BUN 10 8 - 27 mg/dL Creatinine 0.57 (L) 0.76 - 1.27 mg/dL GFR est non- >59 mL/min/1.73 GFR est  >59 mL/min/1.73  
 BUN/Creatinine ratio 18 10 - 24 Sodium 137 134 - 144 mmol/L Potassium 4.2 3.5 - 5.2 mmol/L Comment:  
   Specimen received hemolyzed. Clinical correlation indicated. Chloride 99 96 - 106 mmol/L  
 CO2 16 (L) 20 - 29 mmol/L Calcium 9.1 8.6 - 10.2 mg/dL Protein, total 8.3 6.0 - 8.5 g/dL Albumin 4.5 3.6 - 4.8 g/dL GLOBULIN, TOTAL 3.8 1.5 - 4.5 g/dL A-G Ratio 1.2 1.2 - 2.2 Bilirubin, total 0.4 0.0 - 1.2 mg/dL Alk. phosphatase 72 39 - 117 IU/L  
 AST (SGOT) 13 0 - 40 IU/L  
 ALT (SGPT) 15 0 - 44 IU/L Narrative Performed at:  [address]  [id number] : Romel Diallo MD, Phone:  [phone] CBC WITH AUTOMATED DIFF Result Value Ref Range WBC 8.3 3.4 - 10.8 x10E3/uL  
 RBC 4.73 4.14 - 5.80 x10E6/uL HGB 14.2 13.0 - 17.7 g/dL HCT 42.1 37.5 - 51.0 % MCV 89 79 - 97 fL  
 MCH 30.0 26.6 - 33.0 pg  
 MCHC 33.7 31.5 - 35.7 g/dL  
 RDW 13.9 12.3 - 15.4 % PLATELET 841 632 - 896 x10E3/uL NEUTROPHILS 77 Not Estab. %  Lymphocytes 11 Not Estab. %  
 MONOCYTES 7 Not Estab. % EOSINOPHILS 4 Not Estab. % BASOPHILS 0 Not Estab. %  
 ABS. NEUTROPHILS 6.5 1.4 - 7.0 x10E3/uL Abs Lymphocytes 0.9 0.7 - 3.1 x10E3/uL  
 ABS. MONOCYTES 0.6 0.1 - 0.9 x10E3/uL  
 ABS. EOSINOPHILS 0.3 0.0 - 0.4 x10E3/uL  
 ABS. BASOPHILS 0.0 0.0 - 0.2 x10E3/uL IMMATURE GRANULOCYTES 1 Not Estab. %  
 ABS. IMM. GRANS. 0.1 0.0 - 0.1 x10E3/uL Narrative Performed at:  [address]  [id number] : Boo Jeter MD, Phone:  [phone] VITAMIN D, 25 HYDROXY Result Value Ref Range VITAMIN D, 25-HYDROXY 25.3 (L) 30.0 - 100.0 ng/mL Comment:  
   Vitamin D deficiency has been defined by the [address] [place] [address] practice guideline as a 
level of serum 25-OH vitamin D less than 20 ng/mL (1,2). The Endocrine Society went on to further define vitamin D 
insufficiency as a level between 21 and 29 ng/mL (2). 1. IOM ([place] of Medicine). 2010. Dietary reference 
   intakes for calcium and D. [facility]: The 
   [employer]. 2. Drake MF, Benton [place], Harjit SHEIKH, et al. 
   Evaluation, treatment, and prevention of vitamin D 
   deficiency: an Endocrine Society clinical practice 
   guideline. JCEM. 2011 Jul; 96(7):1911-30. Narrative Performed at:  [address]  [id number] : Boo Jeter MD, Phone:  [phone] RECOMMENDATIONS: 
Overall everything looks good. Your Vitamin D is a bit low. You can start an over the counter Vitamin D at 1000IU daily. It was very nice meeting you and get back in to see me in Dec or January. Please call me if you have any questions: [phone] Sincerely, Guadalupe Burns MD

## 2018-10-24 ENCOUNTER — TELEPHONE (OUTPATIENT)
Dept: INTERNAL MEDICINE CLINIC | Age: 68
End: 2018-10-24

## 2018-10-24 ENCOUNTER — HOME HEALTH ADMISSION (OUTPATIENT)
Dept: HOME HEALTH SERVICES | Facility: HOME HEALTH | Age: 68
End: 2018-10-24
Payer: MEDICARE

## 2018-10-24 DIAGNOSIS — L89.42: Primary | ICD-10-CM

## 2018-10-24 LAB
25(OH)D3+25(OH)D2 SERPL-MCNC: 25.3 NG/ML (ref 30–100)
ALBUMIN SERPL-MCNC: 4.5 G/DL (ref 3.6–4.8)
ALBUMIN/GLOB SERPL: 1.2 {RATIO} (ref 1.2–2.2)
ALP SERPL-CCNC: 72 IU/L (ref 39–117)
ALT SERPL-CCNC: 15 IU/L (ref 0–44)
AST SERPL-CCNC: 13 IU/L (ref 0–40)
BASOPHILS # BLD AUTO: 0 X10E3/UL (ref 0–0.2)
BASOPHILS NFR BLD AUTO: 0 %
BILIRUB SERPL-MCNC: 0.4 MG/DL (ref 0–1.2)
BUN SERPL-MCNC: 10 MG/DL (ref 8–27)
BUN/CREAT SERPL: 18 (ref 10–24)
CALCIUM SERPL-MCNC: 9.1 MG/DL (ref 8.6–10.2)
CHLORIDE SERPL-SCNC: 99 MMOL/L (ref 96–106)
CO2 SERPL-SCNC: 16 MMOL/L (ref 20–29)
CREAT SERPL-MCNC: 0.57 MG/DL (ref 0.76–1.27)
EOSINOPHIL # BLD AUTO: 0.3 X10E3/UL (ref 0–0.4)
EOSINOPHIL NFR BLD AUTO: 4 %
ERYTHROCYTE [DISTWIDTH] IN BLOOD BY AUTOMATED COUNT: 13.9 % (ref 12.3–15.4)
GLOBULIN SER CALC-MCNC: 3.8 G/DL (ref 1.5–4.5)
GLUCOSE SERPL-MCNC: 124 MG/DL (ref 65–99)
HCT VFR BLD AUTO: 42.1 % (ref 37.5–51)
HGB BLD-MCNC: 14.2 G/DL (ref 13–17.7)
IMM GRANULOCYTES # BLD: 0.1 X10E3/UL (ref 0–0.1)
IMM GRANULOCYTES NFR BLD: 1 %
LYMPHOCYTES # BLD AUTO: 0.9 X10E3/UL (ref 0.7–3.1)
LYMPHOCYTES NFR BLD AUTO: 11 %
MCH RBC QN AUTO: 30 PG (ref 26.6–33)
MCHC RBC AUTO-ENTMCNC: 33.7 G/DL (ref 31.5–35.7)
MCV RBC AUTO: 89 FL (ref 79–97)
MONOCYTES # BLD AUTO: 0.6 X10E3/UL (ref 0.1–0.9)
MONOCYTES NFR BLD AUTO: 7 %
NEUTROPHILS # BLD AUTO: 6.5 X10E3/UL (ref 1.4–7)
NEUTROPHILS NFR BLD AUTO: 77 %
PLATELET # BLD AUTO: 247 X10E3/UL (ref 150–379)
POTASSIUM SERPL-SCNC: 4.2 MMOL/L (ref 3.5–5.2)
PROT SERPL-MCNC: 8.3 G/DL (ref 6–8.5)
RBC # BLD AUTO: 4.73 X10E6/UL (ref 4.14–5.8)
SODIUM SERPL-SCNC: 137 MMOL/L (ref 134–144)
WBC # BLD AUTO: 8.3 X10E3/UL (ref 3.4–10.8)

## 2018-10-24 NOTE — TELEPHONE ENCOUNTER
Octavio Steiner called regarding orders Dr. Noni Dennis gave for PT to have home dejan care. Needs detailed phone confirmation from nurse regarding wound care.      Please call back at [phone]

## 2018-10-24 NOTE — TELEPHONE ENCOUNTER
Spoke with Mook Egan at [facility] an gave verbal order for skilled nursing for wound care to treat an evalueate.

## 2018-10-25 ENCOUNTER — HOME CARE VISIT (OUTPATIENT)
Dept: SCHEDULING | Facility: HOME HEALTH | Age: 68
End: 2018-10-25
Payer: MEDICARE

## 2018-10-25 PROCEDURE — G0299 HHS/HOSPICE OF RN EA 15 MIN: HCPCS

## 2018-10-25 PROCEDURE — 400013 HH SOC

## 2018-10-25 PROCEDURE — A6250 SKIN SEAL PROTECT MOISTURIZR: HCPCS

## 2018-10-25 PROCEDURE — 3331090002 HH PPS REVENUE DEBIT

## 2018-10-25 PROCEDURE — 3331090001 HH PPS REVENUE CREDIT

## 2018-10-26 ENCOUNTER — TELEPHONE (OUTPATIENT)
Dept: INTERNAL MEDICINE CLINIC | Age: 68
End: 2018-10-26

## 2018-10-26 VITALS
RESPIRATION RATE: 18 BRPM | HEART RATE: 86 BPM | TEMPERATURE: 98.7 F | DIASTOLIC BLOOD PRESSURE: 72 MMHG | OXYGEN SATURATION: 98 % | SYSTOLIC BLOOD PRESSURE: 108 MMHG

## 2018-10-26 PROCEDURE — 3331090002 HH PPS REVENUE DEBIT

## 2018-10-26 PROCEDURE — 3331090001 HH PPS REVENUE CREDIT

## 2018-10-26 NOTE — TELEPHONE ENCOUNTER
Imani with [address] [place] [address] Is requesting a verbal order for canesten paste for patient's buttocks to prevent skin breakdown due to having some skin changes.  She can be reached at [phone]

## 2018-10-27 PROCEDURE — 3331090001 HH PPS REVENUE CREDIT

## 2018-10-27 PROCEDURE — 3331090002 HH PPS REVENUE DEBIT

## 2018-10-28 PROCEDURE — 3331090002 HH PPS REVENUE DEBIT

## 2018-10-28 PROCEDURE — 3331090001 HH PPS REVENUE CREDIT

## 2018-10-29 ENCOUNTER — TELEPHONE (OUTPATIENT)
Dept: INTERNAL MEDICINE CLINIC | Age: 68
End: 2018-10-29

## 2018-10-29 ENCOUNTER — HOME CARE VISIT (OUTPATIENT)
Dept: SCHEDULING | Facility: HOME HEALTH | Age: 68
End: 2018-10-29
Payer: MEDICARE

## 2018-10-29 PROCEDURE — 3331090001 HH PPS REVENUE CREDIT

## 2018-10-29 PROCEDURE — 3331090002 HH PPS REVENUE DEBIT

## 2018-10-29 NOTE — TELEPHONE ENCOUNTER
Spoke with Sean Pelaez from home health needing verbal for Canesten buttocks paste. Dr. Jaciel Dueans approves. Paste being applied 2 times a week for 2 weeks, then 1 time a week for 6 weeks.

## 2018-10-29 NOTE — TELEPHONE ENCOUNTER
Imani with [facility] needs to speak with [facility]. Some one had written down the wrong no on Friday.  Call [facility] at [phone]

## 2018-10-30 PROCEDURE — 3331090002 HH PPS REVENUE DEBIT

## 2018-10-30 PROCEDURE — 3331090001 HH PPS REVENUE CREDIT

## 2018-10-31 PROCEDURE — 3331090002 HH PPS REVENUE DEBIT

## 2018-10-31 PROCEDURE — 3331090001 HH PPS REVENUE CREDIT

## 2018-11-01 ENCOUNTER — HOME CARE VISIT (OUTPATIENT)
Dept: SCHEDULING | Facility: HOME HEALTH | Age: 68
End: 2018-11-01
Payer: MEDICARE

## 2018-11-01 PROCEDURE — 3331090001 HH PPS REVENUE CREDIT

## 2018-11-01 PROCEDURE — G0299 HHS/HOSPICE OF RN EA 15 MIN: HCPCS

## 2018-11-01 PROCEDURE — 3331090002 HH PPS REVENUE DEBIT

## 2018-11-02 VITALS
DIASTOLIC BLOOD PRESSURE: 66 MMHG | RESPIRATION RATE: 18 BRPM | SYSTOLIC BLOOD PRESSURE: 108 MMHG | TEMPERATURE: 98.4 F | OXYGEN SATURATION: 98 % | HEART RATE: 70 BPM

## 2018-11-02 PROCEDURE — 3331090001 HH PPS REVENUE CREDIT

## 2018-11-02 PROCEDURE — 3331090002 HH PPS REVENUE DEBIT

## 2018-11-03 PROCEDURE — 3331090001 HH PPS REVENUE CREDIT

## 2018-11-03 PROCEDURE — 3331090002 HH PPS REVENUE DEBIT

## 2018-11-04 PROCEDURE — 3331090001 HH PPS REVENUE CREDIT

## 2018-11-04 PROCEDURE — 3331090002 HH PPS REVENUE DEBIT

## 2018-11-05 PROCEDURE — 3331090002 HH PPS REVENUE DEBIT

## 2018-11-05 PROCEDURE — 3331090001 HH PPS REVENUE CREDIT

## 2018-11-06 ENCOUNTER — HOME CARE VISIT (OUTPATIENT)
Dept: SCHEDULING | Facility: HOME HEALTH | Age: 68
End: 2018-11-06
Payer: MEDICARE

## 2018-11-06 VITALS
RESPIRATION RATE: 18 BRPM | TEMPERATURE: 97.6 F | HEART RATE: 88 BPM | SYSTOLIC BLOOD PRESSURE: 108 MMHG | OXYGEN SATURATION: 98 % | DIASTOLIC BLOOD PRESSURE: 76 MMHG

## 2018-11-06 PROCEDURE — G0300 HHS/HOSPICE OF LPN EA 15 MIN: HCPCS

## 2018-11-06 PROCEDURE — 3331090002 HH PPS REVENUE DEBIT

## 2018-11-06 PROCEDURE — 3331090001 HH PPS REVENUE CREDIT

## 2018-11-07 PROCEDURE — 3331090002 HH PPS REVENUE DEBIT

## 2018-11-07 PROCEDURE — 3331090001 HH PPS REVENUE CREDIT

## 2018-11-08 PROCEDURE — 3331090002 HH PPS REVENUE DEBIT

## 2018-11-08 PROCEDURE — 3331090001 HH PPS REVENUE CREDIT

## 2018-11-09 ENCOUNTER — HOME CARE VISIT (OUTPATIENT)
Dept: SCHEDULING | Facility: HOME HEALTH | Age: 68
End: 2018-11-09
Payer: MEDICARE

## 2018-11-09 PROCEDURE — 3331090002 HH PPS REVENUE DEBIT

## 2018-11-09 PROCEDURE — 3331090001 HH PPS REVENUE CREDIT

## 2018-11-10 PROCEDURE — 3331090002 HH PPS REVENUE DEBIT

## 2018-11-10 PROCEDURE — 3331090001 HH PPS REVENUE CREDIT

## 2018-11-11 PROCEDURE — 3331090002 HH PPS REVENUE DEBIT

## 2018-11-11 PROCEDURE — 3331090001 HH PPS REVENUE CREDIT

## 2018-11-12 PROCEDURE — 3331090001 HH PPS REVENUE CREDIT

## 2018-11-12 PROCEDURE — 3331090002 HH PPS REVENUE DEBIT

## 2018-11-13 ENCOUNTER — HOME CARE VISIT (OUTPATIENT)
Dept: SCHEDULING | Facility: HOME HEALTH | Age: 68
End: 2018-11-13
Payer: MEDICARE

## 2018-11-13 PROCEDURE — G0300 HHS/HOSPICE OF LPN EA 15 MIN: HCPCS

## 2018-11-13 PROCEDURE — 3331090002 HH PPS REVENUE DEBIT

## 2018-11-13 PROCEDURE — 3331090001 HH PPS REVENUE CREDIT

## 2018-11-14 VITALS
TEMPERATURE: 97.9 F | OXYGEN SATURATION: 97 % | SYSTOLIC BLOOD PRESSURE: 110 MMHG | HEART RATE: 78 BPM | DIASTOLIC BLOOD PRESSURE: 66 MMHG

## 2018-11-14 PROCEDURE — 3331090002 HH PPS REVENUE DEBIT

## 2018-11-14 PROCEDURE — 3331090001 HH PPS REVENUE CREDIT

## 2018-11-15 PROCEDURE — 3331090001 HH PPS REVENUE CREDIT

## 2018-11-15 PROCEDURE — 3331090002 HH PPS REVENUE DEBIT

## 2018-11-16 ENCOUNTER — DOCUMENTATION ONLY (OUTPATIENT)
Dept: INTERNAL MEDICINE CLINIC | Age: 68
End: 2018-11-16

## 2018-11-16 PROCEDURE — 3331090001 HH PPS REVENUE CREDIT

## 2018-11-16 PROCEDURE — 3331090002 HH PPS REVENUE DEBIT

## 2018-11-16 NOTE — PROGRESS NOTES
Received medical records from Dr SANTINO CASTILLO [facility]. Records have been placed on Dr Julian's desk for review. Vaccination record has been updated.

## 2018-11-17 PROCEDURE — 3331090001 HH PPS REVENUE CREDIT

## 2018-11-17 PROCEDURE — 3331090002 HH PPS REVENUE DEBIT

## 2018-11-18 PROCEDURE — 3331090001 HH PPS REVENUE CREDIT

## 2018-11-18 PROCEDURE — 3331090002 HH PPS REVENUE DEBIT

## 2018-11-19 PROCEDURE — 3331090002 HH PPS REVENUE DEBIT

## 2018-11-19 PROCEDURE — 3331090001 HH PPS REVENUE CREDIT

## 2018-11-20 ENCOUNTER — HOME CARE VISIT (OUTPATIENT)
Dept: SCHEDULING | Facility: HOME HEALTH | Age: 68
End: 2018-11-20
Payer: MEDICARE

## 2018-11-20 PROCEDURE — 3331090002 HH PPS REVENUE DEBIT

## 2018-11-20 PROCEDURE — 3331090001 HH PPS REVENUE CREDIT

## 2018-11-20 PROCEDURE — G0300 HHS/HOSPICE OF LPN EA 15 MIN: HCPCS

## 2018-11-20 RX ADMIN — MENTHOL AND ZINC OXIDE 1 EACH: .44; 20.625 OINTMENT TOPICAL at 10:00

## 2018-11-21 VITALS
TEMPERATURE: 98.6 F | DIASTOLIC BLOOD PRESSURE: 70 MMHG | OXYGEN SATURATION: 98 % | RESPIRATION RATE: 18 BRPM | SYSTOLIC BLOOD PRESSURE: 110 MMHG | HEART RATE: 88 BPM

## 2018-11-21 PROCEDURE — 3331090002 HH PPS REVENUE DEBIT

## 2018-11-21 PROCEDURE — 3331090001 HH PPS REVENUE CREDIT

## 2018-11-22 PROCEDURE — 3331090002 HH PPS REVENUE DEBIT

## 2018-11-22 PROCEDURE — 3331090001 HH PPS REVENUE CREDIT

## 2018-11-23 PROCEDURE — 3331090001 HH PPS REVENUE CREDIT

## 2018-11-23 PROCEDURE — 3331090002 HH PPS REVENUE DEBIT

## 2018-11-24 PROCEDURE — 3331090002 HH PPS REVENUE DEBIT

## 2018-11-24 PROCEDURE — 3331090001 HH PPS REVENUE CREDIT

## 2018-11-25 PROCEDURE — 3331090002 HH PPS REVENUE DEBIT

## 2018-11-25 PROCEDURE — 3331090001 HH PPS REVENUE CREDIT

## 2018-11-26 PROCEDURE — 3331090001 HH PPS REVENUE CREDIT

## 2018-11-26 PROCEDURE — 3331090002 HH PPS REVENUE DEBIT

## 2018-11-27 ENCOUNTER — HOME CARE VISIT (OUTPATIENT)
Dept: SCHEDULING | Facility: HOME HEALTH | Age: 68
End: 2018-11-27
Payer: MEDICARE

## 2018-11-27 ENCOUNTER — TELEPHONE (OUTPATIENT)
Dept: INTERNAL MEDICINE CLINIC | Age: 68
End: 2018-11-27

## 2018-11-27 PROCEDURE — 3331090001 HH PPS REVENUE CREDIT

## 2018-11-27 PROCEDURE — G0299 HHS/HOSPICE OF RN EA 15 MIN: HCPCS

## 2018-11-27 PROCEDURE — 3331090002 HH PPS REVENUE DEBIT

## 2018-11-27 NOTE — TELEPHONE ENCOUNTER
Received phone call from [facility] reporting degradation of pressure ulcer in marjorie buttocks in Pt. Ulcer is being treated with foam, wound care specialist will assess and treat. Also a consultation with Dr Alan Levy is recommended. Harry's phone #: [phone].

## 2018-12-04 NOTE — TELEPHONE ENCOUNTER
----- Message from Laci Damon sent at 12/4/2018 10:56 AM EST -----  Regarding: Dr. Fauzia Pozo Morning, Kathia Schroeder (spouse) is requesting a call back regarding voice mail left on 12/03/18. Best contact:  [phone]

## 2018-12-04 NOTE — TELEPHONE ENCOUNTER
Verified 3 Pt ID. Spoke to Mrs. Destiny To, on HIPAA. Pt and Mrs. Destiny To would like to have a consultation with Dr Lucie Barber and understand that they would switch PCPs.

## 2018-12-12 NOTE — PROGRESS NOTES
[facility]   (previously: At Home)  [phone] 4101 [facility], [address] [place] [address], [address]    Name:  Kristi Mendez  YOB: 1950    Received HBPC & SS referral from Dr. Sandra Yoon for Kristi Mendez. Nurse called pt, no answer, left message requesting call back.     Magdy Chong, RN  Referral Navigator, [facility]

## 2019-01-01 ENCOUNTER — HOME CARE VISIT (OUTPATIENT)
Dept: SCHEDULING | Facility: HOME HEALTH | Age: 69
End: 2019-01-01
Payer: MEDICARE

## 2019-01-01 ENCOUNTER — TELEPHONE (OUTPATIENT)
Dept: FAMILY MEDICINE CLINIC | Age: 69
End: 2019-01-01

## 2019-01-01 ENCOUNTER — HOME VISIT (OUTPATIENT)
Dept: FAMILY MEDICINE CLINIC | Age: 69
End: 2019-01-01

## 2019-01-01 ENCOUNTER — HOME CARE VISIT (OUTPATIENT)
Dept: HOME HEALTH SERVICES | Facility: HOME HEALTH | Age: 69
End: 2019-01-01
Payer: MEDICARE

## 2019-01-01 ENCOUNTER — APPOINTMENT (OUTPATIENT)
Dept: GENERAL RADIOLOGY | Age: 69
DRG: 668 | End: 2019-01-01
Attending: EMERGENCY MEDICINE
Payer: MEDICARE

## 2019-01-01 ENCOUNTER — DOCUMENTATION ONLY (OUTPATIENT)
Dept: FAMILY MEDICINE CLINIC | Age: 69
End: 2019-01-01

## 2019-01-01 ENCOUNTER — TELEPHONE (OUTPATIENT)
Dept: PALLATIVE CARE | Age: 69
End: 2019-01-01

## 2019-01-01 ENCOUNTER — APPOINTMENT (OUTPATIENT)
Dept: VASCULAR SURGERY | Age: 69
DRG: 668 | End: 2019-01-01
Attending: INTERNAL MEDICINE
Payer: MEDICARE

## 2019-01-01 ENCOUNTER — APPOINTMENT (OUTPATIENT)
Dept: GENERAL RADIOLOGY | Age: 69
DRG: 668 | End: 2019-01-01
Attending: INTERNAL MEDICINE
Payer: MEDICARE

## 2019-01-01 ENCOUNTER — APPOINTMENT (OUTPATIENT)
Dept: GENERAL RADIOLOGY | Age: 69
DRG: 871 | End: 2019-01-01
Attending: FAMILY MEDICINE
Payer: MEDICARE

## 2019-01-01 ENCOUNTER — ANESTHESIA (OUTPATIENT)
Dept: ENDOSCOPY | Age: 69
DRG: 668 | End: 2019-01-01
Payer: MEDICARE

## 2019-01-01 ENCOUNTER — ANESTHESIA EVENT (OUTPATIENT)
Dept: ENDOSCOPY | Age: 69
DRG: 668 | End: 2019-01-01
Payer: MEDICARE

## 2019-01-01 ENCOUNTER — HOSPITAL ENCOUNTER (INPATIENT)
Age: 69
LOS: 3 days | DRG: 871 | End: 2019-11-28
Attending: EMERGENCY MEDICINE | Admitting: INTERNAL MEDICINE
Payer: MEDICARE

## 2019-01-01 ENCOUNTER — APPOINTMENT (OUTPATIENT)
Dept: CT IMAGING | Age: 69
DRG: 871 | End: 2019-01-01
Attending: EMERGENCY MEDICINE
Payer: MEDICARE

## 2019-01-01 ENCOUNTER — APPOINTMENT (OUTPATIENT)
Dept: GENERAL RADIOLOGY | Age: 69
DRG: 871 | End: 2019-01-01
Attending: EMERGENCY MEDICINE
Payer: MEDICARE

## 2019-01-01 ENCOUNTER — ANESTHESIA EVENT (OUTPATIENT)
Dept: SURGERY | Age: 69
DRG: 668 | End: 2019-01-01
Payer: MEDICARE

## 2019-01-01 ENCOUNTER — DOCUMENTATION ONLY (OUTPATIENT)
Dept: PALLATIVE CARE | Age: 69
End: 2019-01-01

## 2019-01-01 ENCOUNTER — APPOINTMENT (OUTPATIENT)
Dept: CT IMAGING | Age: 69
DRG: 668 | End: 2019-01-01
Attending: EMERGENCY MEDICINE
Payer: MEDICARE

## 2019-01-01 ENCOUNTER — HOSPITAL ENCOUNTER (INPATIENT)
Dept: ULTRASOUND IMAGING | Age: 69
Discharge: HOME | DRG: 871 | End: 2019-11-27
Attending: INTERNAL MEDICINE
Payer: MEDICARE

## 2019-01-01 ENCOUNTER — ANESTHESIA (OUTPATIENT)
Dept: SURGERY | Age: 69
DRG: 668 | End: 2019-01-01
Payer: MEDICARE

## 2019-01-01 ENCOUNTER — HOSPITAL ENCOUNTER (INPATIENT)
Age: 69
LOS: 14 days | Discharge: HOME HEALTH | DRG: 668 | End: 2019-05-25
Attending: EMERGENCY MEDICINE | Admitting: INTERNAL MEDICINE
Payer: MEDICARE

## 2019-01-01 ENCOUNTER — OFFICE VISIT (OUTPATIENT)
Dept: FAMILY MEDICINE CLINIC | Age: 69
End: 2019-01-01

## 2019-01-01 ENCOUNTER — APPOINTMENT (OUTPATIENT)
Dept: GENERAL RADIOLOGY | Age: 69
DRG: 871 | End: 2019-01-01
Attending: RADIOLOGY
Payer: MEDICARE

## 2019-01-01 ENCOUNTER — APPOINTMENT (OUTPATIENT)
Dept: GENERAL RADIOLOGY | Age: 69
DRG: 668 | End: 2019-01-01
Attending: UROLOGY
Payer: MEDICARE

## 2019-01-01 ENCOUNTER — NURSE NAVIGATOR (OUTPATIENT)
Dept: FAMILY MEDICINE CLINIC | Age: 69
End: 2019-01-01

## 2019-01-01 VITALS
OXYGEN SATURATION: 97 % | HEART RATE: 78 BPM | TEMPERATURE: 98.2 F | SYSTOLIC BLOOD PRESSURE: 125 MMHG | RESPIRATION RATE: 18 BRPM | DIASTOLIC BLOOD PRESSURE: 75 MMHG

## 2019-01-01 VITALS
TEMPERATURE: 98.2 F | RESPIRATION RATE: 18 BRPM | OXYGEN SATURATION: 98 % | SYSTOLIC BLOOD PRESSURE: 110 MMHG | DIASTOLIC BLOOD PRESSURE: 68 MMHG | HEART RATE: 76 BPM

## 2019-01-01 VITALS
HEART RATE: 75 BPM | SYSTOLIC BLOOD PRESSURE: 129 MMHG | TEMPERATURE: 97.4 F | RESPIRATION RATE: 20 BRPM | OXYGEN SATURATION: 98 % | DIASTOLIC BLOOD PRESSURE: 53 MMHG

## 2019-01-01 VITALS
DIASTOLIC BLOOD PRESSURE: 78 MMHG | SYSTOLIC BLOOD PRESSURE: 110 MMHG | RESPIRATION RATE: 18 BRPM | OXYGEN SATURATION: 98 % | HEART RATE: 76 BPM | TEMPERATURE: 98.2 F

## 2019-01-01 VITALS
RESPIRATION RATE: 18 BRPM | TEMPERATURE: 98.7 F | HEART RATE: 76 BPM | OXYGEN SATURATION: 98 % | DIASTOLIC BLOOD PRESSURE: 68 MMHG | SYSTOLIC BLOOD PRESSURE: 110 MMHG

## 2019-01-01 VITALS
TEMPERATURE: 97.4 F | HEART RATE: 66 BPM | DIASTOLIC BLOOD PRESSURE: 85 MMHG | RESPIRATION RATE: 18 BRPM | OXYGEN SATURATION: 98 % | SYSTOLIC BLOOD PRESSURE: 131 MMHG

## 2019-01-01 VITALS
OXYGEN SATURATION: 99 % | HEART RATE: 70 BPM | TEMPERATURE: 98.4 F | DIASTOLIC BLOOD PRESSURE: 60 MMHG | SYSTOLIC BLOOD PRESSURE: 108 MMHG | RESPIRATION RATE: 15 BRPM

## 2019-01-01 VITALS
OXYGEN SATURATION: 97 % | TEMPERATURE: 98.2 F | HEART RATE: 85 BPM | DIASTOLIC BLOOD PRESSURE: 68 MMHG | SYSTOLIC BLOOD PRESSURE: 109 MMHG | RESPIRATION RATE: 18 BRPM

## 2019-01-01 VITALS
RESPIRATION RATE: 16 BRPM | SYSTOLIC BLOOD PRESSURE: 144 MMHG | HEART RATE: 72 BPM | TEMPERATURE: 97.5 F | DIASTOLIC BLOOD PRESSURE: 76 MMHG | OXYGEN SATURATION: 98 %

## 2019-01-01 VITALS — HEART RATE: 86 BPM | OXYGEN SATURATION: 97 % | RESPIRATION RATE: 18 BRPM | TEMPERATURE: 98.2 F

## 2019-01-01 VITALS
RESPIRATION RATE: 16 BRPM | TEMPERATURE: 98.2 F | SYSTOLIC BLOOD PRESSURE: 120 MMHG | HEART RATE: 74 BPM | OXYGEN SATURATION: 99 % | DIASTOLIC BLOOD PRESSURE: 65 MMHG

## 2019-01-01 VITALS
TEMPERATURE: 98.2 F | SYSTOLIC BLOOD PRESSURE: 122 MMHG | DIASTOLIC BLOOD PRESSURE: 68 MMHG | HEART RATE: 62 BPM | RESPIRATION RATE: 18 BRPM | OXYGEN SATURATION: 98 %

## 2019-01-01 VITALS
RESPIRATION RATE: 16 BRPM | TEMPERATURE: 98.7 F | DIASTOLIC BLOOD PRESSURE: 54 MMHG | OXYGEN SATURATION: 98 % | SYSTOLIC BLOOD PRESSURE: 100 MMHG | HEART RATE: 70 BPM

## 2019-01-01 VITALS
RESPIRATION RATE: 18 BRPM | OXYGEN SATURATION: 98 % | DIASTOLIC BLOOD PRESSURE: 68 MMHG | SYSTOLIC BLOOD PRESSURE: 110 MMHG | TEMPERATURE: 98.2 F | HEART RATE: 76 BPM

## 2019-01-01 VITALS
OXYGEN SATURATION: 98 % | TEMPERATURE: 98.2 F | RESPIRATION RATE: 18 BRPM | SYSTOLIC BLOOD PRESSURE: 110 MMHG | HEART RATE: 76 BPM | DIASTOLIC BLOOD PRESSURE: 68 MMHG

## 2019-01-01 VITALS
TEMPERATURE: 98.2 F | DIASTOLIC BLOOD PRESSURE: 78 MMHG | SYSTOLIC BLOOD PRESSURE: 116 MMHG | RESPIRATION RATE: 18 BRPM | HEART RATE: 76 BPM | OXYGEN SATURATION: 98 %

## 2019-01-01 VITALS
SYSTOLIC BLOOD PRESSURE: 110 MMHG | HEART RATE: 76 BPM | TEMPERATURE: 98.2 F | RESPIRATION RATE: 18 BRPM | DIASTOLIC BLOOD PRESSURE: 68 MMHG | OXYGEN SATURATION: 98 %

## 2019-01-01 VITALS
RESPIRATION RATE: 18 BRPM | OXYGEN SATURATION: 98 % | TEMPERATURE: 97.6 F | HEART RATE: 90 BPM | SYSTOLIC BLOOD PRESSURE: 110 MMHG | DIASTOLIC BLOOD PRESSURE: 76 MMHG

## 2019-01-01 VITALS
DIASTOLIC BLOOD PRESSURE: 68 MMHG | TEMPERATURE: 98.2 F | SYSTOLIC BLOOD PRESSURE: 110 MMHG | HEART RATE: 76 BPM | OXYGEN SATURATION: 98 % | RESPIRATION RATE: 18 BRPM

## 2019-01-01 VITALS
OXYGEN SATURATION: 98 % | HEART RATE: 76 BPM | RESPIRATION RATE: 18 BRPM | DIASTOLIC BLOOD PRESSURE: 66 MMHG | TEMPERATURE: 98.2 F | SYSTOLIC BLOOD PRESSURE: 110 MMHG

## 2019-01-01 VITALS
OXYGEN SATURATION: 96 % | HEART RATE: 76 BPM | TEMPERATURE: 98 F | SYSTOLIC BLOOD PRESSURE: 130 MMHG | DIASTOLIC BLOOD PRESSURE: 70 MMHG | RESPIRATION RATE: 16 BRPM

## 2019-01-01 VITALS — TEMPERATURE: 97.8 F | RESPIRATION RATE: 16 BRPM | OXYGEN SATURATION: 96 % | HEART RATE: 70 BPM

## 2019-01-01 VITALS
DIASTOLIC BLOOD PRESSURE: 66 MMHG | SYSTOLIC BLOOD PRESSURE: 110 MMHG | OXYGEN SATURATION: 98 % | TEMPERATURE: 98.2 F | RESPIRATION RATE: 18 BRPM | HEART RATE: 76 BPM

## 2019-01-01 VITALS
RESPIRATION RATE: 16 BRPM | HEART RATE: 71 BPM | TEMPERATURE: 98.7 F | DIASTOLIC BLOOD PRESSURE: 82 MMHG | SYSTOLIC BLOOD PRESSURE: 110 MMHG | OXYGEN SATURATION: 98 %

## 2019-01-01 VITALS
TEMPERATURE: 98.4 F | OXYGEN SATURATION: 98 % | RESPIRATION RATE: 16 BRPM | DIASTOLIC BLOOD PRESSURE: 60 MMHG | SYSTOLIC BLOOD PRESSURE: 114 MMHG | HEART RATE: 75 BPM

## 2019-01-01 VITALS
TEMPERATURE: 98.1 F | DIASTOLIC BLOOD PRESSURE: 60 MMHG | SYSTOLIC BLOOD PRESSURE: 125 MMHG | RESPIRATION RATE: 18 BRPM | OXYGEN SATURATION: 95 % | HEART RATE: 65 BPM

## 2019-01-01 VITALS
TEMPERATURE: 98.6 F | HEART RATE: 76 BPM | DIASTOLIC BLOOD PRESSURE: 70 MMHG | RESPIRATION RATE: 18 BRPM | OXYGEN SATURATION: 98 % | SYSTOLIC BLOOD PRESSURE: 110 MMHG

## 2019-01-01 VITALS
RESPIRATION RATE: 18 BRPM | OXYGEN SATURATION: 97 % | TEMPERATURE: 97.6 F | DIASTOLIC BLOOD PRESSURE: 74 MMHG | HEART RATE: 72 BPM | SYSTOLIC BLOOD PRESSURE: 132 MMHG

## 2019-01-01 VITALS
OXYGEN SATURATION: 97 % | SYSTOLIC BLOOD PRESSURE: 106 MMHG | DIASTOLIC BLOOD PRESSURE: 60 MMHG | RESPIRATION RATE: 16 BRPM | HEART RATE: 86 BPM | TEMPERATURE: 97.2 F

## 2019-01-01 VITALS — OXYGEN SATURATION: 68 % | HEART RATE: 70 BPM | TEMPERATURE: 97.8 F

## 2019-01-01 VITALS
HEART RATE: 66 BPM | SYSTOLIC BLOOD PRESSURE: 118 MMHG | OXYGEN SATURATION: 98 % | RESPIRATION RATE: 18 BRPM | DIASTOLIC BLOOD PRESSURE: 68 MMHG | TEMPERATURE: 98.2 F

## 2019-01-01 VITALS
OXYGEN SATURATION: 89 % | RESPIRATION RATE: 18 BRPM | HEART RATE: 71 BPM | SYSTOLIC BLOOD PRESSURE: 128 MMHG | TEMPERATURE: 98.2 F | DIASTOLIC BLOOD PRESSURE: 78 MMHG

## 2019-01-01 VITALS
HEART RATE: 76 BPM | DIASTOLIC BLOOD PRESSURE: 68 MMHG | TEMPERATURE: 98 F | RESPIRATION RATE: 18 BRPM | SYSTOLIC BLOOD PRESSURE: 110 MMHG | OXYGEN SATURATION: 98 %

## 2019-01-01 VITALS
DIASTOLIC BLOOD PRESSURE: 70 MMHG | SYSTOLIC BLOOD PRESSURE: 122 MMHG | TEMPERATURE: 98 F | HEART RATE: 76 BPM | RESPIRATION RATE: 18 BRPM | OXYGEN SATURATION: 98 %

## 2019-01-01 VITALS
RESPIRATION RATE: 18 BRPM | HEART RATE: 62 BPM | OXYGEN SATURATION: 99 % | DIASTOLIC BLOOD PRESSURE: 60 MMHG | SYSTOLIC BLOOD PRESSURE: 100 MMHG | TEMPERATURE: 97.9 F

## 2019-01-01 VITALS
OXYGEN SATURATION: 98 % | SYSTOLIC BLOOD PRESSURE: 110 MMHG | HEART RATE: 76 BPM | TEMPERATURE: 98.2 F | RESPIRATION RATE: 18 BRPM | DIASTOLIC BLOOD PRESSURE: 68 MMHG

## 2019-01-01 VITALS
DIASTOLIC BLOOD PRESSURE: 70 MMHG | TEMPERATURE: 98.2 F | SYSTOLIC BLOOD PRESSURE: 120 MMHG | OXYGEN SATURATION: 98 % | RESPIRATION RATE: 19 BRPM | HEART RATE: 70 BPM

## 2019-01-01 VITALS
TEMPERATURE: 98.4 F | HEART RATE: 78 BPM | OXYGEN SATURATION: 98 % | DIASTOLIC BLOOD PRESSURE: 70 MMHG | SYSTOLIC BLOOD PRESSURE: 127 MMHG | RESPIRATION RATE: 16 BRPM

## 2019-01-01 VITALS
OXYGEN SATURATION: 96 % | SYSTOLIC BLOOD PRESSURE: 134 MMHG | HEART RATE: 90 BPM | DIASTOLIC BLOOD PRESSURE: 61 MMHG | RESPIRATION RATE: 23 BRPM

## 2019-01-01 VITALS
HEART RATE: 78 BPM | OXYGEN SATURATION: 98 % | OXYGEN SATURATION: 97 % | TEMPERATURE: 98.6 F | HEART RATE: 78 BPM | DIASTOLIC BLOOD PRESSURE: 78 MMHG | DIASTOLIC BLOOD PRESSURE: 88 MMHG | RESPIRATION RATE: 18 BRPM | SYSTOLIC BLOOD PRESSURE: 110 MMHG | SYSTOLIC BLOOD PRESSURE: 110 MMHG | TEMPERATURE: 98.6 F | RESPIRATION RATE: 18 BRPM

## 2019-01-01 VITALS
RESPIRATION RATE: 19 BRPM | TEMPERATURE: 98.3 F | HEART RATE: 82 BPM | OXYGEN SATURATION: 96 % | DIASTOLIC BLOOD PRESSURE: 72 MMHG | SYSTOLIC BLOOD PRESSURE: 101 MMHG

## 2019-01-01 VITALS — TEMPERATURE: 98.1 F | DIASTOLIC BLOOD PRESSURE: 74 MMHG | SYSTOLIC BLOOD PRESSURE: 120 MMHG | HEART RATE: 64 BPM

## 2019-01-01 VITALS
SYSTOLIC BLOOD PRESSURE: 132 MMHG | RESPIRATION RATE: 20 BRPM | OXYGEN SATURATION: 98 % | DIASTOLIC BLOOD PRESSURE: 71 MMHG | HEART RATE: 67 BPM | TEMPERATURE: 97.9 F

## 2019-01-01 VITALS
HEART RATE: 82 BPM | TEMPERATURE: 98.6 F | DIASTOLIC BLOOD PRESSURE: 72 MMHG | OXYGEN SATURATION: 99 % | HEART RATE: 78 BPM | TEMPERATURE: 98.2 F | SYSTOLIC BLOOD PRESSURE: 110 MMHG | OXYGEN SATURATION: 98 % | SYSTOLIC BLOOD PRESSURE: 133 MMHG | RESPIRATION RATE: 18 BRPM | DIASTOLIC BLOOD PRESSURE: 68 MMHG

## 2019-01-01 VITALS
RESPIRATION RATE: 18 BRPM | TEMPERATURE: 98.6 F | HEART RATE: 80 BPM | OXYGEN SATURATION: 96 % | DIASTOLIC BLOOD PRESSURE: 78 MMHG | SYSTOLIC BLOOD PRESSURE: 110 MMHG

## 2019-01-01 VITALS
OXYGEN SATURATION: 98 % | DIASTOLIC BLOOD PRESSURE: 70 MMHG | HEART RATE: 83 BPM | TEMPERATURE: 98.8 F | SYSTOLIC BLOOD PRESSURE: 127 MMHG

## 2019-01-01 VITALS
RESPIRATION RATE: 18 BRPM | HEART RATE: 74 BPM | DIASTOLIC BLOOD PRESSURE: 68 MMHG | TEMPERATURE: 49.6 F | OXYGEN SATURATION: 97 % | SYSTOLIC BLOOD PRESSURE: 110 MMHG

## 2019-01-01 VITALS
RESPIRATION RATE: 20 BRPM | RESPIRATION RATE: 20 BRPM | OXYGEN SATURATION: 98 % | OXYGEN SATURATION: 98 % | TEMPERATURE: 98 F | DIASTOLIC BLOOD PRESSURE: 70 MMHG | SYSTOLIC BLOOD PRESSURE: 128 MMHG | TEMPERATURE: 98.1 F | HEART RATE: 78 BPM | DIASTOLIC BLOOD PRESSURE: 64 MMHG | SYSTOLIC BLOOD PRESSURE: 119 MMHG | HEART RATE: 71 BPM

## 2019-01-01 VITALS
DIASTOLIC BLOOD PRESSURE: 66 MMHG | TEMPERATURE: 99.1 F | HEART RATE: 67 BPM | OXYGEN SATURATION: 97 % | RESPIRATION RATE: 18 BRPM | SYSTOLIC BLOOD PRESSURE: 100 MMHG

## 2019-01-01 VITALS
OXYGEN SATURATION: 98 % | DIASTOLIC BLOOD PRESSURE: 74 MMHG | HEART RATE: 78 BPM | TEMPERATURE: 98.6 F | SYSTOLIC BLOOD PRESSURE: 110 MMHG | RESPIRATION RATE: 18 BRPM

## 2019-01-01 VITALS
HEART RATE: 72 BPM | SYSTOLIC BLOOD PRESSURE: 110 MMHG | TEMPERATURE: 98.2 F | OXYGEN SATURATION: 98 % | DIASTOLIC BLOOD PRESSURE: 66 MMHG | RESPIRATION RATE: 18 BRPM

## 2019-01-01 VITALS
HEART RATE: 78 BPM | TEMPERATURE: 98.6 F | SYSTOLIC BLOOD PRESSURE: 110 MMHG | OXYGEN SATURATION: 98 % | RESPIRATION RATE: 18 BRPM | DIASTOLIC BLOOD PRESSURE: 70 MMHG

## 2019-01-01 VITALS
SYSTOLIC BLOOD PRESSURE: 119 MMHG | DIASTOLIC BLOOD PRESSURE: 74 MMHG | HEART RATE: 74 BPM | TEMPERATURE: 98.5 F | RESPIRATION RATE: 16 BRPM | OXYGEN SATURATION: 95 %

## 2019-01-01 VITALS
DIASTOLIC BLOOD PRESSURE: 61 MMHG | RESPIRATION RATE: 16 BRPM | TEMPERATURE: 97.4 F | HEART RATE: 85 BPM | OXYGEN SATURATION: 98 % | SYSTOLIC BLOOD PRESSURE: 90 MMHG

## 2019-01-01 VITALS
OXYGEN SATURATION: 96 % | TEMPERATURE: 98.2 F | SYSTOLIC BLOOD PRESSURE: 130 MMHG | HEART RATE: 76 BPM | DIASTOLIC BLOOD PRESSURE: 88 MMHG | RESPIRATION RATE: 18 BRPM

## 2019-01-01 VITALS
TEMPERATURE: 97.3 F | DIASTOLIC BLOOD PRESSURE: 66 MMHG | HEART RATE: 69 BPM | OXYGEN SATURATION: 98 % | SYSTOLIC BLOOD PRESSURE: 121 MMHG

## 2019-01-01 VITALS
BODY MASS INDEX: 25.03 KG/M2 | HEART RATE: 88 BPM | WEIGHT: 184.8 LBS | TEMPERATURE: 97.8 F | HEIGHT: 72 IN | DIASTOLIC BLOOD PRESSURE: 65 MMHG | OXYGEN SATURATION: 95 % | RESPIRATION RATE: 18 BRPM | SYSTOLIC BLOOD PRESSURE: 112 MMHG

## 2019-01-01 VITALS
RESPIRATION RATE: 16 BRPM | HEART RATE: 65 BPM | OXYGEN SATURATION: 99 % | DIASTOLIC BLOOD PRESSURE: 82 MMHG | SYSTOLIC BLOOD PRESSURE: 126 MMHG | TEMPERATURE: 97.7 F

## 2019-01-01 VITALS
TEMPERATURE: 98 F | RESPIRATION RATE: 18 BRPM | DIASTOLIC BLOOD PRESSURE: 86 MMHG | HEART RATE: 72 BPM | OXYGEN SATURATION: 96 % | SYSTOLIC BLOOD PRESSURE: 110 MMHG

## 2019-01-01 VITALS
DIASTOLIC BLOOD PRESSURE: 74 MMHG | HEART RATE: 72 BPM | SYSTOLIC BLOOD PRESSURE: 110 MMHG | OXYGEN SATURATION: 98 % | TEMPERATURE: 97.3 F | RESPIRATION RATE: 18 BRPM

## 2019-01-01 VITALS
RESPIRATION RATE: 16 BRPM | SYSTOLIC BLOOD PRESSURE: 123 MMHG | OXYGEN SATURATION: 99 % | TEMPERATURE: 97.8 F | DIASTOLIC BLOOD PRESSURE: 62 MMHG | HEART RATE: 78 BPM

## 2019-01-01 VITALS
HEART RATE: 61 BPM | RESPIRATION RATE: 14 BRPM | TEMPERATURE: 97.7 F | DIASTOLIC BLOOD PRESSURE: 64 MMHG | OXYGEN SATURATION: 99 % | SYSTOLIC BLOOD PRESSURE: 122 MMHG

## 2019-01-01 VITALS
RESPIRATION RATE: 18 BRPM | OXYGEN SATURATION: 96 % | SYSTOLIC BLOOD PRESSURE: 104 MMHG | TEMPERATURE: 98.5 F | DIASTOLIC BLOOD PRESSURE: 64 MMHG | HEART RATE: 78 BPM

## 2019-01-01 VITALS
OXYGEN SATURATION: 98 % | SYSTOLIC BLOOD PRESSURE: 130 MMHG | TEMPERATURE: 97.3 F | RESPIRATION RATE: 20 BRPM | HEART RATE: 63 BPM | DIASTOLIC BLOOD PRESSURE: 70 MMHG

## 2019-01-01 VITALS
WEIGHT: 202.5 LBS | OXYGEN SATURATION: 84 % | BODY MASS INDEX: 27.43 KG/M2 | RESPIRATION RATE: 17 BRPM | DIASTOLIC BLOOD PRESSURE: 28 MMHG | SYSTOLIC BLOOD PRESSURE: 50 MMHG | HEART RATE: 108 BPM | HEIGHT: 72 IN | TEMPERATURE: 101.2 F

## 2019-01-01 VITALS
HEART RATE: 68 BPM | DIASTOLIC BLOOD PRESSURE: 60 MMHG | OXYGEN SATURATION: 96 % | RESPIRATION RATE: 18 BRPM | TEMPERATURE: 98.9 F | SYSTOLIC BLOOD PRESSURE: 130 MMHG

## 2019-01-01 VITALS
OXYGEN SATURATION: 98 % | TEMPERATURE: 98.3 F | DIASTOLIC BLOOD PRESSURE: 68 MMHG | SYSTOLIC BLOOD PRESSURE: 110 MMHG | HEART RATE: 68 BPM | RESPIRATION RATE: 18 BRPM

## 2019-01-01 VITALS
DIASTOLIC BLOOD PRESSURE: 68 MMHG | OXYGEN SATURATION: 98 % | RESPIRATION RATE: 18 BRPM | HEART RATE: 76 BPM | TEMPERATURE: 97.4 F | SYSTOLIC BLOOD PRESSURE: 110 MMHG

## 2019-01-01 VITALS
OXYGEN SATURATION: 98 % | RESPIRATION RATE: 18 BRPM | TEMPERATURE: 98.2 F | HEART RATE: 76 BPM | SYSTOLIC BLOOD PRESSURE: 122 MMHG | DIASTOLIC BLOOD PRESSURE: 70 MMHG

## 2019-01-01 VITALS
RESPIRATION RATE: 20 BRPM | HEART RATE: 68 BPM | TEMPERATURE: 99 F | SYSTOLIC BLOOD PRESSURE: 136 MMHG | OXYGEN SATURATION: 98 % | DIASTOLIC BLOOD PRESSURE: 73 MMHG

## 2019-01-01 VITALS
HEART RATE: 76 BPM | RESPIRATION RATE: 18 BRPM | OXYGEN SATURATION: 98 % | TEMPERATURE: 98.2 F | DIASTOLIC BLOOD PRESSURE: 68 MMHG | SYSTOLIC BLOOD PRESSURE: 100 MMHG

## 2019-01-01 DIAGNOSIS — K21.9 GASTROESOPHAGEAL REFLUX DISEASE WITHOUT ESOPHAGITIS: ICD-10-CM

## 2019-01-01 DIAGNOSIS — R31.9 URINARY TRACT INFECTION WITH HEMATURIA, SITE UNSPECIFIED: ICD-10-CM

## 2019-01-01 DIAGNOSIS — Z00.00 HEALTHCARE MAINTENANCE: ICD-10-CM

## 2019-01-01 DIAGNOSIS — Z13.31 SCREENING FOR DEPRESSION: ICD-10-CM

## 2019-01-01 DIAGNOSIS — S31.000S WOUND OF SACRAL REGION, SEQUELA: ICD-10-CM

## 2019-01-01 DIAGNOSIS — G70.9 NEUROMUSCULAR RESPIRATORY WEAKNESS (HCC): ICD-10-CM

## 2019-01-01 DIAGNOSIS — G82.50 QUADRIPLEGIA (HCC): ICD-10-CM

## 2019-01-01 DIAGNOSIS — L89.152 PRESSURE INJURY OF SACRAL REGION, STAGE 2 (HCC): ICD-10-CM

## 2019-01-01 DIAGNOSIS — R09.02 HYPOXIA: ICD-10-CM

## 2019-01-01 DIAGNOSIS — J18.9 PNEUMONIA OF LEFT LOWER LOBE DUE TO INFECTIOUS ORGANISM: Primary | ICD-10-CM

## 2019-01-01 DIAGNOSIS — L89.92: ICD-10-CM

## 2019-01-01 DIAGNOSIS — B37.2 CANDIDAL DERMATITIS: ICD-10-CM

## 2019-01-01 DIAGNOSIS — Z97.8 INDWELLING FOLEY CATHETER PRESENT: ICD-10-CM

## 2019-01-01 DIAGNOSIS — R14.0 ABDOMINAL DISTENSION: ICD-10-CM

## 2019-01-01 DIAGNOSIS — Z93.1 PEG (PERCUTANEOUS ENDOSCOPIC GASTROSTOMY) STATUS (HCC): ICD-10-CM

## 2019-01-01 DIAGNOSIS — J96.01 SEPSIS WITH ACUTE HYPOXIC RESPIRATORY FAILURE WITHOUT SEPTIC SHOCK, DUE TO UNSPECIFIED ORGANISM (HCC): ICD-10-CM

## 2019-01-01 DIAGNOSIS — R33.9 URINARY RETENTION: ICD-10-CM

## 2019-01-01 DIAGNOSIS — Z86.19 HEPATITIS C VIRUS INFECTION CURED AFTER ANTIVIRAL DRUG THERAPY: ICD-10-CM

## 2019-01-01 DIAGNOSIS — G82.50 QUADRIPLEGIA (HCC): Primary | ICD-10-CM

## 2019-01-01 DIAGNOSIS — R13.10 DYSPHAGIA, UNSPECIFIED TYPE: ICD-10-CM

## 2019-01-01 DIAGNOSIS — A41.9 SEPTIC SHOCK (HCC): ICD-10-CM

## 2019-01-01 DIAGNOSIS — E55.9 HYPOVITAMINOSIS D: ICD-10-CM

## 2019-01-01 DIAGNOSIS — Z13.39 SCREENING FOR ALCOHOLISM: ICD-10-CM

## 2019-01-01 DIAGNOSIS — R10.9 ACUTE ABDOMINAL PAIN: ICD-10-CM

## 2019-01-01 DIAGNOSIS — Z97.8 FOLEY CATHETER IN PLACE: ICD-10-CM

## 2019-01-01 DIAGNOSIS — R65.20 SEPSIS WITH ACUTE HYPOXIC RESPIRATORY FAILURE WITHOUT SEPTIC SHOCK, DUE TO UNSPECIFIED ORGANISM (HCC): ICD-10-CM

## 2019-01-01 DIAGNOSIS — J06.9 VIRAL UPPER RESPIRATORY TRACT INFECTION: ICD-10-CM

## 2019-01-01 DIAGNOSIS — R21 RASH OF BACK: ICD-10-CM

## 2019-01-01 DIAGNOSIS — L89.152 DECUBITUS ULCER OF SACRAL REGION, STAGE 2 (HCC): ICD-10-CM

## 2019-01-01 DIAGNOSIS — N48.89 PAIN IN PENIS: ICD-10-CM

## 2019-01-01 DIAGNOSIS — L74.9 SWEATING ABNORMALITY: ICD-10-CM

## 2019-01-01 DIAGNOSIS — S31.000D WOUND OF SACRAL REGION, SUBSEQUENT ENCOUNTER: ICD-10-CM

## 2019-01-01 DIAGNOSIS — N12 PYELONEPHRITIS: ICD-10-CM

## 2019-01-01 DIAGNOSIS — N39.0 URINARY TRACT INFECTION WITH HEMATURIA, SITE UNSPECIFIED: ICD-10-CM

## 2019-01-01 DIAGNOSIS — N32.81 OAB (OVERACTIVE BLADDER): ICD-10-CM

## 2019-01-01 DIAGNOSIS — M25.562 CHRONIC PAIN OF LEFT KNEE: ICD-10-CM

## 2019-01-01 DIAGNOSIS — G89.4 CHRONIC PAIN SYNDROME: ICD-10-CM

## 2019-01-01 DIAGNOSIS — T17.908D ASPIRATION INTO RESPIRATORY TRACT, SUBSEQUENT ENCOUNTER: ICD-10-CM

## 2019-01-01 DIAGNOSIS — R10.84 ABDOMINAL PAIN, GENERALIZED: Primary | ICD-10-CM

## 2019-01-01 DIAGNOSIS — Z71.89 ADVANCE CARE PLANNING: ICD-10-CM

## 2019-01-01 DIAGNOSIS — G89.29 CHRONIC PAIN OF LEFT KNEE: ICD-10-CM

## 2019-01-01 DIAGNOSIS — Z93.1 PEG (PERCUTANEOUS ENDOSCOPIC GASTROSTOMY) STATUS (HCC): Primary | ICD-10-CM

## 2019-01-01 DIAGNOSIS — L89.152 DECUBITUS ULCER OF SACRAL REGION, STAGE 2 (HCC): Primary | ICD-10-CM

## 2019-01-01 DIAGNOSIS — R13.12 OROPHARYNGEAL DYSPHAGIA: ICD-10-CM

## 2019-01-01 DIAGNOSIS — R13.12 OROPHARYNGEAL DYSPHAGIA: Primary | ICD-10-CM

## 2019-01-01 DIAGNOSIS — N20.0 NEPHROLITHIASIS: ICD-10-CM

## 2019-01-01 DIAGNOSIS — B37.2 CANDIDIASIS OF SKIN: ICD-10-CM

## 2019-01-01 DIAGNOSIS — S31.000A SACRAL WOUND, INITIAL ENCOUNTER: ICD-10-CM

## 2019-01-01 DIAGNOSIS — S31.000S WOUND OF SACRAL REGION, SEQUELA: Primary | ICD-10-CM

## 2019-01-01 DIAGNOSIS — D72.829 LEUKOCYTOSIS, UNSPECIFIED TYPE: ICD-10-CM

## 2019-01-01 DIAGNOSIS — A41.9 SEVERE SEPSIS (HCC): Primary | ICD-10-CM

## 2019-01-01 DIAGNOSIS — J99 NEUROMUSCULAR RESPIRATORY WEAKNESS (HCC): ICD-10-CM

## 2019-01-01 DIAGNOSIS — A41.9 SEPSIS WITH ACUTE HYPOXIC RESPIRATORY FAILURE WITHOUT SEPTIC SHOCK, DUE TO UNSPECIFIED ORGANISM (HCC): ICD-10-CM

## 2019-01-01 DIAGNOSIS — Z97.8 FOLEY CATHETER IN PLACE: Primary | ICD-10-CM

## 2019-01-01 DIAGNOSIS — R65.21 SEPTIC SHOCK (HCC): ICD-10-CM

## 2019-01-01 DIAGNOSIS — N32.81 OAB (OVERACTIVE BLADDER): Primary | ICD-10-CM

## 2019-01-01 DIAGNOSIS — R65.20 SEVERE SEPSIS (HCC): Primary | ICD-10-CM

## 2019-01-01 DIAGNOSIS — M24.50 FLEXION CONTRACTURES: ICD-10-CM

## 2019-01-01 DIAGNOSIS — Z11.59 NEED FOR HEPATITIS C SCREENING TEST: ICD-10-CM

## 2019-01-01 LAB
ALBUMIN FLD-MCNC: 2 G/DL
ALBUMIN SERPL-MCNC: 1.8 G/DL (ref 3.5–5)
ALBUMIN SERPL-MCNC: 1.9 G/DL (ref 3.5–5)
ALBUMIN SERPL-MCNC: 1.9 G/DL (ref 3.5–5)
ALBUMIN SERPL-MCNC: 2 G/DL (ref 3.5–5)
ALBUMIN SERPL-MCNC: 2.2 G/DL (ref 3.5–5)
ALBUMIN SERPL-MCNC: 2.2 G/DL (ref 3.5–5)
ALBUMIN SERPL-MCNC: 2.8 G/DL (ref 3.5–5)
ALBUMIN SERPL-MCNC: 3.4 G/DL (ref 3.5–5)
ALBUMIN SERPL-MCNC: 4 G/DL (ref 3.6–4.8)
ALBUMIN/GLOB SERPL: 0.4 {RATIO} (ref 1.1–2.2)
ALBUMIN/GLOB SERPL: 0.4 {RATIO} (ref 1.1–2.2)
ALBUMIN/GLOB SERPL: 0.5 {RATIO} (ref 1.1–2.2)
ALBUMIN/GLOB SERPL: 0.7 {RATIO} (ref 1.1–2.2)
ALBUMIN/GLOB SERPL: 1.1 {RATIO} (ref 1.2–2.2)
ALP SERPL-CCNC: 103 U/L (ref 45–117)
ALP SERPL-CCNC: 65 IU/L (ref 39–117)
ALP SERPL-CCNC: 65 U/L (ref 45–117)
ALP SERPL-CCNC: 69 U/L (ref 45–117)
ALP SERPL-CCNC: 81 U/L (ref 45–117)
ALP SERPL-CCNC: 85 U/L (ref 45–117)
ALP SERPL-CCNC: 88 U/L (ref 45–117)
ALP SERPL-CCNC: 91 U/L (ref 45–117)
ALT SERPL-CCNC: 16 IU/L (ref 0–44)
ALT SERPL-CCNC: 20 U/L (ref 12–78)
ALT SERPL-CCNC: 25 U/L (ref 12–78)
ALT SERPL-CCNC: 25 U/L (ref 12–78)
ALT SERPL-CCNC: 27 U/L (ref 12–78)
ALT SERPL-CCNC: 32 U/L (ref 12–78)
ALT SERPL-CCNC: 39 U/L (ref 12–78)
ALT SERPL-CCNC: 50 U/L (ref 12–78)
ANION GAP SERPL CALC-SCNC: 1 MMOL/L (ref 5–15)
ANION GAP SERPL CALC-SCNC: 11 MMOL/L (ref 5–15)
ANION GAP SERPL CALC-SCNC: 14 MMOL/L (ref 5–15)
ANION GAP SERPL CALC-SCNC: 2 MMOL/L (ref 5–15)
ANION GAP SERPL CALC-SCNC: 4 MMOL/L (ref 5–15)
ANION GAP SERPL CALC-SCNC: 5 MMOL/L (ref 5–15)
ANION GAP SERPL CALC-SCNC: 6 MMOL/L (ref 5–15)
ANION GAP SERPL CALC-SCNC: 8 MMOL/L (ref 5–15)
ANION GAP SERPL CALC-SCNC: 9 MMOL/L (ref 5–15)
ANION GAP SERPL CALC-SCNC: 9 MMOL/L (ref 5–15)
APPEARANCE FLD: ABNORMAL
APPEARANCE UR: ABNORMAL
APPEARANCE UR: CLEAR
APTT PPP: 29.9 SEC (ref 22.1–32)
ARTERIAL PATENCY WRIST A: ABNORMAL
AST SERPL-CCNC: 14 IU/L (ref 0–40)
AST SERPL-CCNC: 14 U/L (ref 15–37)
AST SERPL-CCNC: 15 U/L (ref 15–37)
AST SERPL-CCNC: 16 U/L (ref 15–37)
AST SERPL-CCNC: 17 U/L (ref 15–37)
AST SERPL-CCNC: 19 U/L (ref 15–37)
AST SERPL-CCNC: 22 U/L (ref 15–37)
AST SERPL-CCNC: 29 U/L (ref 15–37)
ATRIAL RATE: 102 BPM
ATRIAL RATE: 137 BPM
BACTERIA SPEC CULT: ABNORMAL
BACTERIA SPEC CULT: NORMAL
BACTERIA URNS QL MICRO: ABNORMAL /HPF
BACTERIA URNS QL MICRO: ABNORMAL /HPF
BACTERIA URNS QL MICRO: NEGATIVE /HPF
BACTERIA URNS QL MICRO: NEGATIVE /HPF
BASE DEFICIT BLDA-SCNC: 5.4 MMOL/L
BASOPHILS # BLD: 0 K/UL (ref 0–0.1)
BASOPHILS NFR BLD: 0 % (ref 0–1)
BDY SITE: ABNORMAL
BILIRUB SERPL-MCNC: 0.3 MG/DL (ref 0.2–1)
BILIRUB SERPL-MCNC: 0.3 MG/DL (ref 0.2–1)
BILIRUB SERPL-MCNC: 0.4 MG/DL (ref 0.2–1)
BILIRUB SERPL-MCNC: 0.4 MG/DL (ref 0.2–1)
BILIRUB SERPL-MCNC: 0.4 MG/DL (ref 0–1.2)
BILIRUB SERPL-MCNC: 0.7 MG/DL (ref 0.2–1)
BILIRUB SERPL-MCNC: 0.8 MG/DL (ref 0.2–1)
BILIRUB SERPL-MCNC: 1.1 MG/DL (ref 0.2–1)
BILIRUB UR QL: NEGATIVE
BUN SERPL-MCNC: 10 MG/DL (ref 6–20)
BUN SERPL-MCNC: 11 MG/DL (ref 6–20)
BUN SERPL-MCNC: 13 MG/DL (ref 6–20)
BUN SERPL-MCNC: 14 MG/DL (ref 6–20)
BUN SERPL-MCNC: 16 MG/DL (ref 6–20)
BUN SERPL-MCNC: 16 MG/DL (ref 6–20)
BUN SERPL-MCNC: 18 MG/DL (ref 6–20)
BUN SERPL-MCNC: 19 MG/DL (ref 6–20)
BUN SERPL-MCNC: 37 MG/DL (ref 6–20)
BUN SERPL-MCNC: 39 MG/DL (ref 6–20)
BUN SERPL-MCNC: 40 MG/DL (ref 6–20)
BUN SERPL-MCNC: 7 MG/DL (ref 6–20)
BUN SERPL-MCNC: 8 MG/DL (ref 6–20)
BUN SERPL-MCNC: 9 MG/DL (ref 6–20)
BUN SERPL-MCNC: 9 MG/DL (ref 6–20)
BUN SERPL-MCNC: 9 MG/DL (ref 8–27)
BUN/CREAT SERPL: 11 (ref 10–24)
BUN/CREAT SERPL: 12 (ref 12–20)
BUN/CREAT SERPL: 13 (ref 12–20)
BUN/CREAT SERPL: 15 (ref 12–20)
BUN/CREAT SERPL: 18 (ref 12–20)
BUN/CREAT SERPL: 19 (ref 12–20)
BUN/CREAT SERPL: 20 (ref 12–20)
BUN/CREAT SERPL: 21 (ref 12–20)
BUN/CREAT SERPL: 24 (ref 12–20)
BUN/CREAT SERPL: 24 (ref 12–20)
BUN/CREAT SERPL: 26 (ref 12–20)
BUN/CREAT SERPL: 27 (ref 12–20)
BUN/CREAT SERPL: 31 (ref 12–20)
BUN/CREAT SERPL: 33 (ref 12–20)
CALCIUM SERPL-MCNC: 7.5 MG/DL (ref 8.5–10.1)
CALCIUM SERPL-MCNC: 7.6 MG/DL (ref 8.5–10.1)
CALCIUM SERPL-MCNC: 7.6 MG/DL (ref 8.5–10.1)
CALCIUM SERPL-MCNC: 7.7 MG/DL (ref 8.5–10.1)
CALCIUM SERPL-MCNC: 7.7 MG/DL (ref 8.5–10.1)
CALCIUM SERPL-MCNC: 7.8 MG/DL (ref 8.5–10.1)
CALCIUM SERPL-MCNC: 7.9 MG/DL (ref 8.5–10.1)
CALCIUM SERPL-MCNC: 7.9 MG/DL (ref 8.5–10.1)
CALCIUM SERPL-MCNC: 8 MG/DL (ref 8.5–10.1)
CALCIUM SERPL-MCNC: 8.7 MG/DL (ref 8.5–10.1)
CALCIUM SERPL-MCNC: 9.2 MG/DL (ref 8.6–10.2)
CALCIUM SERPL-MCNC: 9.4 MG/DL (ref 8.5–10.1)
CALCULATED P AXIS, ECG09: 25 DEGREES
CALCULATED P AXIS, ECG09: 35 DEGREES
CALCULATED R AXIS, ECG10: 26 DEGREES
CALCULATED R AXIS, ECG10: 54 DEGREES
CALCULATED T AXIS, ECG11: 68 DEGREES
CALCULATED T AXIS, ECG11: 73 DEGREES
CC UR VC: ABNORMAL
CC UR VC: ABNORMAL
CHLORIDE SERPL-SCNC: 101 MMOL/L (ref 97–108)
CHLORIDE SERPL-SCNC: 103 MMOL/L (ref 97–108)
CHLORIDE SERPL-SCNC: 104 MMOL/L (ref 97–108)
CHLORIDE SERPL-SCNC: 104 MMOL/L (ref 97–108)
CHLORIDE SERPL-SCNC: 105 MMOL/L (ref 97–108)
CHLORIDE SERPL-SCNC: 107 MMOL/L (ref 97–108)
CHLORIDE SERPL-SCNC: 109 MMOL/L (ref 97–108)
CHLORIDE SERPL-SCNC: 110 MMOL/L (ref 97–108)
CHLORIDE SERPL-SCNC: 110 MMOL/L (ref 97–108)
CHLORIDE SERPL-SCNC: 111 MMOL/L (ref 97–108)
CHLORIDE SERPL-SCNC: 111 MMOL/L (ref 97–108)
CHLORIDE SERPL-SCNC: 114 MMOL/L (ref 97–108)
CHLORIDE SERPL-SCNC: 99 MMOL/L (ref 96–106)
CO2 SERPL-SCNC: 17 MMOL/L (ref 21–32)
CO2 SERPL-SCNC: 17 MMOL/L (ref 21–32)
CO2 SERPL-SCNC: 18 MMOL/L (ref 21–32)
CO2 SERPL-SCNC: 19 MMOL/L (ref 21–32)
CO2 SERPL-SCNC: 21 MMOL/L (ref 21–32)
CO2 SERPL-SCNC: 22 MMOL/L (ref 21–32)
CO2 SERPL-SCNC: 22 MMOL/L (ref 21–32)
CO2 SERPL-SCNC: 23 MMOL/L (ref 20–29)
CO2 SERPL-SCNC: 23 MMOL/L (ref 21–32)
CO2 SERPL-SCNC: 23 MMOL/L (ref 21–32)
CO2 SERPL-SCNC: 25 MMOL/L (ref 21–32)
CO2 SERPL-SCNC: 25 MMOL/L (ref 21–32)
CO2 SERPL-SCNC: 26 MMOL/L (ref 21–32)
CO2 SERPL-SCNC: 27 MMOL/L (ref 21–32)
CO2 SERPL-SCNC: 28 MMOL/L (ref 21–32)
CO2 SERPL-SCNC: 29 MMOL/L (ref 21–32)
COLOR FLD: ABNORMAL
COLOR UR: ABNORMAL
COLOR UR: ABNORMAL
COLOR UR: YELLOW
COLOR UR: YELLOW
COMMENT, HOLDF: NORMAL
CREAT SERPL-MCNC: 0.44 MG/DL (ref 0.7–1.3)
CREAT SERPL-MCNC: 0.52 MG/DL (ref 0.7–1.3)
CREAT SERPL-MCNC: 0.55 MG/DL (ref 0.7–1.3)
CREAT SERPL-MCNC: 0.58 MG/DL (ref 0.7–1.3)
CREAT SERPL-MCNC: 0.6 MG/DL (ref 0.7–1.3)
CREAT SERPL-MCNC: 0.6 MG/DL (ref 0.7–1.3)
CREAT SERPL-MCNC: 0.62 MG/DL (ref 0.7–1.3)
CREAT SERPL-MCNC: 0.66 MG/DL (ref 0.7–1.3)
CREAT SERPL-MCNC: 0.69 MG/DL (ref 0.7–1.3)
CREAT SERPL-MCNC: 0.69 MG/DL (ref 0.7–1.3)
CREAT SERPL-MCNC: 0.74 MG/DL (ref 0.7–1.3)
CREAT SERPL-MCNC: 0.74 MG/DL (ref 0.7–1.3)
CREAT SERPL-MCNC: 0.81 MG/DL (ref 0.76–1.27)
CREAT SERPL-MCNC: 1.21 MG/DL (ref 0.7–1.3)
CREAT SERPL-MCNC: 1.75 MG/DL (ref 0.7–1.3)
CREAT SERPL-MCNC: 2.6 MG/DL (ref 0.7–1.3)
DIAGNOSIS, 93000: NORMAL
DIAGNOSIS, 93000: NORMAL
DIFFERENTIAL METHOD BLD: ABNORMAL
DRUGS UR: NORMAL
EOSINOPHIL # BLD: 0 K/UL (ref 0–0.4)
EOSINOPHIL # BLD: 0.2 K/UL (ref 0–0.4)
EOSINOPHIL NFR BLD: 0 % (ref 0–7)
EOSINOPHIL NFR BLD: 2 % (ref 0–7)
EPITH CASTS URNS QL MICRO: ABNORMAL /LPF
ERYTHROCYTE [DISTWIDTH] IN BLOOD BY AUTOMATED COUNT: 13.5 % (ref 11.5–14.5)
ERYTHROCYTE [DISTWIDTH] IN BLOOD BY AUTOMATED COUNT: 13.7 % (ref 11.5–14.5)
ERYTHROCYTE [DISTWIDTH] IN BLOOD BY AUTOMATED COUNT: 14 % (ref 11.5–14.5)
ERYTHROCYTE [DISTWIDTH] IN BLOOD BY AUTOMATED COUNT: 14.3 % (ref 11.5–14.5)
ERYTHROCYTE [DISTWIDTH] IN BLOOD BY AUTOMATED COUNT: 14.3 % (ref 12.3–15.4)
ERYTHROCYTE [DISTWIDTH] IN BLOOD BY AUTOMATED COUNT: 14.4 % (ref 11.5–14.5)
ERYTHROCYTE [DISTWIDTH] IN BLOOD BY AUTOMATED COUNT: 14.6 % (ref 11.5–14.5)
ERYTHROCYTE [DISTWIDTH] IN BLOOD BY AUTOMATED COUNT: 15.1 % (ref 11.5–14.5)
ERYTHROCYTE [DISTWIDTH] IN BLOOD BY AUTOMATED COUNT: 15.1 % (ref 11.5–14.5)
ERYTHROCYTE [DISTWIDTH] IN BLOOD BY AUTOMATED COUNT: 15.2 % (ref 11.5–14.5)
ERYTHROCYTE [DISTWIDTH] IN BLOOD BY AUTOMATED COUNT: 15.4 % (ref 11.5–14.5)
EST. AVERAGE GLUCOSE BLD GHB EST-MCNC: 100 MG/DL
FERRITIN SERPL-MCNC: 213 NG/ML (ref 26–388)
FIO2 ON VENT: 100 %
FOLATE SERPL-MCNC: 7.1 NG/ML (ref 5–21)
GAS FLOW.O2 O2 DELIVERY SYS: 15 L/MIN
GLOBULIN SER CALC-MCNC: 3.6 G/DL (ref 1.5–4.5)
GLOBULIN SER CALC-MCNC: 3.7 G/DL (ref 2–4)
GLOBULIN SER CALC-MCNC: 4 G/DL (ref 2–4)
GLOBULIN SER CALC-MCNC: 4.2 G/DL (ref 2–4)
GLOBULIN SER CALC-MCNC: 4.4 G/DL (ref 2–4)
GLOBULIN SER CALC-MCNC: 4.5 G/DL (ref 2–4)
GLOBULIN SER CALC-MCNC: 4.9 G/DL (ref 2–4)
GLOBULIN SER CALC-MCNC: 6.2 G/DL (ref 2–4)
GLUCOSE BLD STRIP.AUTO-MCNC: 123 MG/DL (ref 65–100)
GLUCOSE BLD STRIP.AUTO-MCNC: 132 MG/DL (ref 65–100)
GLUCOSE BLD STRIP.AUTO-MCNC: 169 MG/DL (ref 65–100)
GLUCOSE SERPL-MCNC: 108 MG/DL (ref 65–99)
GLUCOSE SERPL-MCNC: 116 MG/DL (ref 65–100)
GLUCOSE SERPL-MCNC: 119 MG/DL (ref 65–100)
GLUCOSE SERPL-MCNC: 122 MG/DL (ref 65–100)
GLUCOSE SERPL-MCNC: 128 MG/DL (ref 65–100)
GLUCOSE SERPL-MCNC: 134 MG/DL (ref 65–100)
GLUCOSE SERPL-MCNC: 134 MG/DL (ref 65–100)
GLUCOSE SERPL-MCNC: 137 MG/DL (ref 65–100)
GLUCOSE SERPL-MCNC: 139 MG/DL (ref 65–100)
GLUCOSE SERPL-MCNC: 151 MG/DL (ref 65–100)
GLUCOSE SERPL-MCNC: 153 MG/DL (ref 65–100)
GLUCOSE SERPL-MCNC: 155 MG/DL (ref 65–100)
GLUCOSE SERPL-MCNC: 166 MG/DL (ref 65–100)
GLUCOSE SERPL-MCNC: 170 MG/DL (ref 65–100)
GLUCOSE SERPL-MCNC: 85 MG/DL (ref 65–100)
GLUCOSE SERPL-MCNC: 89 MG/DL (ref 65–100)
GLUCOSE UR STRIP.AUTO-MCNC: NEGATIVE MG/DL
HAPTOGLOB SERPL-MCNC: 257 MG/DL (ref 30–200)
HBA1C MFR BLD: 5.1 % (ref 4.2–6.3)
HCO3 BLDA-SCNC: 23 MMOL/L (ref 22–26)
HCT VFR BLD AUTO: 25.1 % (ref 36.6–50.3)
HCT VFR BLD AUTO: 25.5 % (ref 36.6–50.3)
HCT VFR BLD AUTO: 25.5 % (ref 36.6–50.3)
HCT VFR BLD AUTO: 25.9 % (ref 36.6–50.3)
HCT VFR BLD AUTO: 27.5 % (ref 36.6–50.3)
HCT VFR BLD AUTO: 28.1 % (ref 36.6–50.3)
HCT VFR BLD AUTO: 29 % (ref 36.6–50.3)
HCT VFR BLD AUTO: 32.7 % (ref 36.6–50.3)
HCT VFR BLD AUTO: 37.1 % (ref 36.6–50.3)
HCT VFR BLD AUTO: 38.9 % (ref 36.6–50.3)
HCT VFR BLD AUTO: 39.7 % (ref 37.5–51)
HCT VFR BLD AUTO: 40.7 % (ref 36.6–50.3)
HCT VFR BLD AUTO: 43.6 % (ref 36.6–50.3)
HCT VFR BLD AUTO: 45.9 % (ref 36.6–50.3)
HCV AB S/CO SERPL IA: >11 S/CO RATIO (ref 0–0.9)
HGB BLD-MCNC: 10.2 G/DL (ref 12.1–17)
HGB BLD-MCNC: 12 G/DL (ref 12.1–17)
HGB BLD-MCNC: 12.5 G/DL (ref 12.1–17)
HGB BLD-MCNC: 13.3 G/DL (ref 12.1–17)
HGB BLD-MCNC: 13.4 G/DL (ref 13–17.7)
HGB BLD-MCNC: 13.8 G/DL (ref 12.1–17)
HGB BLD-MCNC: 15.1 G/DL (ref 12.1–17)
HGB BLD-MCNC: 8.3 G/DL (ref 12.1–17)
HGB BLD-MCNC: 8.3 G/DL (ref 12.1–17)
HGB BLD-MCNC: 8.4 G/DL (ref 12.1–17)
HGB BLD-MCNC: 8.5 G/DL (ref 12.1–17)
HGB BLD-MCNC: 8.6 G/DL (ref 12.1–17)
HGB BLD-MCNC: 8.9 G/DL (ref 12.1–17)
HGB BLD-MCNC: 9.6 G/DL (ref 12.1–17)
HGB UR QL STRIP: ABNORMAL
HGB UR QL STRIP: NEGATIVE
HYALINE CASTS URNS QL MICRO: ABNORMAL /LPF (ref 0–5)
HYALINE CASTS URNS QL MICRO: ABNORMAL /LPF (ref 0–5)
IMM GRANULOCYTES # BLD AUTO: 0 K/UL
IMM GRANULOCYTES # BLD AUTO: 0 K/UL (ref 0–0.04)
IMM GRANULOCYTES # BLD AUTO: 0.1 K/UL (ref 0–0.04)
IMM GRANULOCYTES # BLD AUTO: 0.1 K/UL (ref 0–0.04)
IMM GRANULOCYTES # BLD AUTO: 0.2 K/UL (ref 0–0.04)
IMM GRANULOCYTES NFR BLD AUTO: 0 %
IMM GRANULOCYTES NFR BLD AUTO: 0 % (ref 0–0.5)
IMM GRANULOCYTES NFR BLD AUTO: 1 % (ref 0–0.5)
INR PPP: 1.3 (ref 0.9–1.1)
IRON SATN MFR SERPL: 13 % (ref 20–50)
IRON SERPL-MCNC: 22 UG/DL (ref 35–150)
KETONES UR QL STRIP.AUTO: 15 MG/DL
KETONES UR QL STRIP.AUTO: 15 MG/DL
KETONES UR QL STRIP.AUTO: NEGATIVE MG/DL
KETONES UR QL STRIP.AUTO: NEGATIVE MG/DL
LACTATE BLD-SCNC: 3.15 MMOL/L (ref 0.4–2)
LACTATE BLD-SCNC: 4.01 MMOL/L (ref 0.4–2)
LACTATE BLD-SCNC: 5.71 MMOL/L (ref 0.4–2)
LACTATE SERPL-SCNC: 1.4 MMOL/L (ref 0.4–2)
LACTATE SERPL-SCNC: 1.7 MMOL/L (ref 0.4–2)
LACTATE SERPL-SCNC: 2.3 MMOL/L (ref 0.4–2)
LACTATE SERPL-SCNC: 2.4 MMOL/L (ref 0.4–2)
LACTATE SERPL-SCNC: 2.8 MMOL/L (ref 0.4–2)
LACTATE SERPL-SCNC: 2.9 MMOL/L (ref 0.4–2)
LDH FLD L TO P-CCNC: 585 U/L
LDH SERPL L TO P-CCNC: 144 U/L (ref 85–241)
LEFT ABI: 1.19
LEFT ANTERIOR TIBIAL: 126 MMHG
LEFT POSTERIOR TIBIAL: 86 MMHG
LEFT TBI: 0.59
LEFT TOE PRESSURE: 63 MMHG
LEUKOCYTE ESTERASE UR QL STRIP.AUTO: ABNORMAL
LIPASE SERPL-CCNC: 45 U/L (ref 73–393)
LIPASE SERPL-CCNC: 51 U/L (ref 73–393)
LYMPHOCYTES # BLD: 0.8 K/UL (ref 0.8–3.5)
LYMPHOCYTES # BLD: 1 K/UL (ref 0.8–3.5)
LYMPHOCYTES # BLD: 1.1 K/UL (ref 0.8–3.5)
LYMPHOCYTES # BLD: 1.2 K/UL (ref 0.8–3.5)
LYMPHOCYTES # BLD: 1.4 K/UL (ref 0.8–3.5)
LYMPHOCYTES NFR BLD: 4 % (ref 12–49)
LYMPHOCYTES NFR BLD: 6 % (ref 12–49)
LYMPHOCYTES NFR BLD: 6 % (ref 12–49)
LYMPHOCYTES NFR BLD: 7 % (ref 12–49)
LYMPHOCYTES NFR BLD: 9 % (ref 12–49)
LYMPHOCYTES NFR FLD: 16 %
MAGNESIUM SERPL-MCNC: 1.8 MG/DL (ref 1.6–2.4)
MAGNESIUM SERPL-MCNC: 1.9 MG/DL (ref 1.6–2.4)
MAGNESIUM SERPL-MCNC: 1.9 MG/DL (ref 1.6–2.4)
MAGNESIUM SERPL-MCNC: 2.1 MG/DL (ref 1.6–2.4)
MAGNESIUM SERPL-MCNC: 2.1 MG/DL (ref 1.6–2.4)
MAGNESIUM SERPL-MCNC: 2.2 MG/DL (ref 1.6–2.4)
MAGNESIUM SERPL-MCNC: 2.3 MG/DL (ref 1.6–2.4)
MCH RBC QN AUTO: 28.8 PG (ref 26–34)
MCH RBC QN AUTO: 28.9 PG (ref 26–34)
MCH RBC QN AUTO: 29 PG (ref 26–34)
MCH RBC QN AUTO: 29.1 PG (ref 26–34)
MCH RBC QN AUTO: 29.4 PG (ref 26–34)
MCH RBC QN AUTO: 29.5 PG (ref 26–34)
MCH RBC QN AUTO: 29.6 PG (ref 26–34)
MCH RBC QN AUTO: 29.8 PG (ref 26–34)
MCH RBC QN AUTO: 29.9 PG (ref 26–34)
MCH RBC QN AUTO: 29.9 PG (ref 26–34)
MCH RBC QN AUTO: 30.1 PG (ref 26–34)
MCH RBC QN AUTO: 30.2 PG (ref 26–34)
MCH RBC QN AUTO: 30.5 PG (ref 26.6–33)
MCH RBC QN AUTO: 30.7 PG (ref 26–34)
MCHC RBC AUTO-ENTMCNC: 30.8 G/DL (ref 30–36.5)
MCHC RBC AUTO-ENTMCNC: 31.2 G/DL (ref 30–36.5)
MCHC RBC AUTO-ENTMCNC: 31.3 G/DL (ref 30–36.5)
MCHC RBC AUTO-ENTMCNC: 31.7 G/DL (ref 30–36.5)
MCHC RBC AUTO-ENTMCNC: 31.7 G/DL (ref 30–36.5)
MCHC RBC AUTO-ENTMCNC: 32.5 G/DL (ref 30–36.5)
MCHC RBC AUTO-ENTMCNC: 32.7 G/DL (ref 30–36.5)
MCHC RBC AUTO-ENTMCNC: 32.8 G/DL (ref 30–36.5)
MCHC RBC AUTO-ENTMCNC: 32.9 G/DL (ref 30–36.5)
MCHC RBC AUTO-ENTMCNC: 32.9 G/DL (ref 30–36.5)
MCHC RBC AUTO-ENTMCNC: 33.1 G/DL (ref 30–36.5)
MCHC RBC AUTO-ENTMCNC: 33.1 G/DL (ref 30–36.5)
MCHC RBC AUTO-ENTMCNC: 33.7 G/DL (ref 30–36.5)
MCHC RBC AUTO-ENTMCNC: 33.8 G/DL (ref 31.5–35.7)
MCV RBC AUTO: 89.8 FL (ref 80–99)
MCV RBC AUTO: 90 FL (ref 79–97)
MCV RBC AUTO: 90.1 FL (ref 80–99)
MCV RBC AUTO: 90.3 FL (ref 80–99)
MCV RBC AUTO: 90.4 FL (ref 80–99)
MCV RBC AUTO: 90.9 FL (ref 80–99)
MCV RBC AUTO: 91.2 FL (ref 80–99)
MCV RBC AUTO: 91.2 FL (ref 80–99)
MCV RBC AUTO: 91.5 FL (ref 80–99)
MCV RBC AUTO: 91.8 FL (ref 80–99)
MCV RBC AUTO: 91.8 FL (ref 80–99)
MCV RBC AUTO: 93.4 FL (ref 80–99)
MCV RBC AUTO: 94 FL (ref 80–99)
MCV RBC AUTO: 94.2 FL (ref 80–99)
METAMYELOCYTES NFR BLD MANUAL: 1 %
MONOCYTES # BLD: 0.6 K/UL (ref 0–1)
MONOCYTES # BLD: 0.8 K/UL (ref 0–1)
MONOCYTES # BLD: 0.9 K/UL (ref 0–1)
MONOCYTES # BLD: 0.9 K/UL (ref 0–1)
MONOCYTES # BLD: 1 K/UL (ref 0–1)
MONOCYTES NFR BLD: 3 % (ref 5–13)
MONOCYTES NFR BLD: 4 % (ref 5–13)
MONOCYTES NFR BLD: 5 % (ref 5–13)
MONOCYTES NFR BLD: 6 % (ref 5–13)
MONOCYTES NFR BLD: 7 % (ref 5–13)
MONOS+MACROS NFR FLD: 7 %
MUCOUS THREADS URNS QL MICRO: ABNORMAL /LPF
NEUTROPHILS NFR FLD: 77 %
NEUTS BAND NFR BLD MANUAL: 1 % (ref 0–6)
NEUTS SEG # BLD: 12 K/UL (ref 1.8–8)
NEUTS SEG # BLD: 18.3 K/UL (ref 1.8–8)
NEUTS SEG # BLD: 21 K/UL (ref 1.8–8)
NEUTS SEG # BLD: 25.5 K/UL (ref 1.8–8)
NEUTS SEG # BLD: 7.6 K/UL (ref 1.8–8)
NEUTS SEG NFR BLD: 81 % (ref 32–75)
NEUTS SEG NFR BLD: 86 % (ref 32–75)
NEUTS SEG NFR BLD: 89 % (ref 32–75)
NEUTS SEG NFR BLD: 89 % (ref 32–75)
NEUTS SEG NFR BLD: 93 % (ref 32–75)
NITRITE UR QL STRIP.AUTO: NEGATIVE
NITRITE UR QL STRIP.AUTO: NEGATIVE
NITRITE UR QL STRIP.AUTO: POSITIVE
NITRITE UR QL STRIP.AUTO: POSITIVE
NRBC # BLD: 0 K/UL (ref 0–0.01)
NRBC BLD-RTO: 0 PER 100 WBC
NUC CELL # FLD: 4045 /CU MM
OTHER,OTHU: ABNORMAL
P-R INTERVAL, ECG05: 128 MS
P-R INTERVAL, ECG05: 156 MS
PCO2 BLDA: 58 MMHG (ref 35–45)
PH BLDA: 7.22 [PH] (ref 7.35–7.45)
PH UR STRIP: 5 [PH] (ref 5–8)
PH UR STRIP: 6 [PH] (ref 5–8)
PH UR STRIP: 7 [PH] (ref 5–8)
PH UR STRIP: 8.5 [PH] (ref 5–8)
PHOSPHATE SERPL-MCNC: 1.3 MG/DL (ref 2.6–4.7)
PHOSPHATE SERPL-MCNC: 2.2 MG/DL (ref 2.6–4.7)
PHOSPHATE SERPL-MCNC: 2.8 MG/DL (ref 2.6–4.7)
PHOSPHATE SERPL-MCNC: 3.4 MG/DL (ref 2.6–4.7)
PHOSPHATE SERPL-MCNC: 3.4 MG/DL (ref 2.6–4.7)
PHOSPHATE SERPL-MCNC: 3.7 MG/DL (ref 2.6–4.7)
PLATELET # BLD AUTO: 113 K/UL (ref 150–400)
PLATELET # BLD AUTO: 114 K/UL (ref 150–400)
PLATELET # BLD AUTO: 147 K/UL (ref 150–400)
PLATELET # BLD AUTO: 189 K/UL (ref 150–400)
PLATELET # BLD AUTO: 195 X10E3/UL (ref 150–379)
PLATELET # BLD AUTO: 211 K/UL (ref 150–400)
PLATELET # BLD AUTO: 241 K/UL (ref 150–400)
PLATELET # BLD AUTO: 247 K/UL (ref 150–400)
PLATELET # BLD AUTO: 250 K/UL (ref 150–400)
PLATELET # BLD AUTO: 290 K/UL (ref 150–400)
PLATELET # BLD AUTO: 291 K/UL (ref 150–400)
PLATELET # BLD AUTO: 330 K/UL (ref 150–400)
PLATELET # BLD AUTO: 335 K/UL (ref 150–400)
PLATELET # BLD AUTO: 82 K/UL (ref 150–400)
PMV BLD AUTO: 10 FL (ref 8.9–12.9)
PMV BLD AUTO: 10 FL (ref 8.9–12.9)
PMV BLD AUTO: 10.2 FL (ref 8.9–12.9)
PMV BLD AUTO: 10.3 FL (ref 8.9–12.9)
PMV BLD AUTO: 10.6 FL (ref 8.9–12.9)
PMV BLD AUTO: 10.8 FL (ref 8.9–12.9)
PMV BLD AUTO: 10.9 FL (ref 8.9–12.9)
PMV BLD AUTO: 11 FL (ref 8.9–12.9)
PMV BLD AUTO: 11 FL (ref 8.9–12.9)
PMV BLD AUTO: 11.3 FL (ref 8.9–12.9)
PMV BLD AUTO: 9.6 FL (ref 8.9–12.9)
PMV BLD AUTO: 9.6 FL (ref 8.9–12.9)
PMV BLD AUTO: 9.9 FL (ref 8.9–12.9)
PO2 BLDA: 77 MMHG (ref 80–100)
POTASSIUM SERPL-SCNC: 3.1 MMOL/L (ref 3.5–5.1)
POTASSIUM SERPL-SCNC: 3.1 MMOL/L (ref 3.5–5.1)
POTASSIUM SERPL-SCNC: 3.4 MMOL/L (ref 3.5–5.1)
POTASSIUM SERPL-SCNC: 3.6 MMOL/L (ref 3.5–5.1)
POTASSIUM SERPL-SCNC: 3.7 MMOL/L (ref 3.5–5.1)
POTASSIUM SERPL-SCNC: 3.8 MMOL/L (ref 3.5–5.1)
POTASSIUM SERPL-SCNC: 3.9 MMOL/L (ref 3.5–5.1)
POTASSIUM SERPL-SCNC: 4 MMOL/L (ref 3.5–5.1)
POTASSIUM SERPL-SCNC: 4.2 MMOL/L (ref 3.5–5.2)
POTASSIUM SERPL-SCNC: 4.3 MMOL/L (ref 3.5–5.1)
POTASSIUM SERPL-SCNC: 4.3 MMOL/L (ref 3.5–5.1)
POTASSIUM SERPL-SCNC: 4.5 MMOL/L (ref 3.5–5.1)
POTASSIUM SERPL-SCNC: 4.8 MMOL/L (ref 3.5–5.1)
POTASSIUM SERPL-SCNC: 4.8 MMOL/L (ref 3.5–5.1)
POTASSIUM SERPL-SCNC: 4.9 MMOL/L (ref 3.5–5.1)
POTASSIUM SERPL-SCNC: 4.9 MMOL/L (ref 3.5–5.1)
PROT FLD-MCNC: 5.2 G/DL
PROT SERPL-MCNC: 5.6 G/DL (ref 6.4–8.2)
PROT SERPL-MCNC: 5.8 G/DL (ref 6.4–8.2)
PROT SERPL-MCNC: 6.4 G/DL (ref 6.4–8.2)
PROT SERPL-MCNC: 6.6 G/DL (ref 6.4–8.2)
PROT SERPL-MCNC: 6.9 G/DL (ref 6.4–8.2)
PROT SERPL-MCNC: 7 G/DL (ref 6.4–8.2)
PROT SERPL-MCNC: 7.6 G/DL (ref 6–8.5)
PROT SERPL-MCNC: 9.6 G/DL (ref 6.4–8.2)
PROT UR STRIP-MCNC: 100 MG/DL
PROT UR STRIP-MCNC: 30 MG/DL
PROT UR STRIP-MCNC: ABNORMAL MG/DL
PROT UR STRIP-MCNC: NEGATIVE MG/DL
PROTHROMBIN TIME: 13.4 SEC (ref 9–11.1)
Q-T INTERVAL, ECG07: 266 MS
Q-T INTERVAL, ECG07: 332 MS
QRS DURATION, ECG06: 64 MS
QRS DURATION, ECG06: 68 MS
QTC CALCULATION (BEZET), ECG08: 401 MS
QTC CALCULATION (BEZET), ECG08: 432 MS
RBC # BLD AUTO: 2.78 M/UL (ref 4.1–5.7)
RBC # BLD AUTO: 2.82 M/UL (ref 4.1–5.7)
RBC # BLD AUTO: 2.82 M/UL (ref 4.1–5.7)
RBC # BLD AUTO: 2.84 M/UL (ref 4.1–5.7)
RBC # BLD AUTO: 2.92 M/UL (ref 4.1–5.7)
RBC # BLD AUTO: 3.09 M/UL (ref 4.1–5.7)
RBC # BLD AUTO: 3.22 M/UL (ref 4.1–5.7)
RBC # BLD AUTO: 3.5 M/UL (ref 4.1–5.7)
RBC # BLD AUTO: 4.07 M/UL (ref 4.1–5.7)
RBC # BLD AUTO: 4.14 M/UL (ref 4.1–5.7)
RBC # BLD AUTO: 4.4 X10E6/UL (ref 4.14–5.8)
RBC # BLD AUTO: 4.45 M/UL (ref 4.1–5.7)
RBC # BLD AUTO: 4.78 M/UL (ref 4.1–5.7)
RBC # BLD AUTO: 5 M/UL (ref 4.1–5.7)
RBC # FLD: >100 /CU MM
RBC #/AREA URNS HPF: >100 /HPF (ref 0–5)
RBC #/AREA URNS HPF: ABNORMAL /HPF (ref 0–5)
RBC MORPH BLD: ABNORMAL
RETICS # AUTO: 0.06 M/UL (ref 0.03–0.1)
RETICS/RBC NFR AUTO: 2.1 % (ref 0.7–2.1)
RIGHT ABI: 1.18
RIGHT ANTERIOR TIBIAL: 125 MMHG
RIGHT ARM BP: 106 MMHG
RIGHT POSTERIOR TIBIAL: 100 MMHG
RIGHT TBI: 0.65
RIGHT TOE PRESSURE: 69 MMHG
SAMPLES BEING HELD,HOLD: NORMAL
SAO2 % BLD: 93 % (ref 92–97)
SAO2% DEVICE SAO2% SENSOR NAME: ABNORMAL
SERVICE CMNT-IMP: ABNORMAL
SERVICE CMNT-IMP: NORMAL
SODIUM SERPL-SCNC: 129 MMOL/L (ref 136–145)
SODIUM SERPL-SCNC: 132 MMOL/L (ref 136–145)
SODIUM SERPL-SCNC: 133 MMOL/L (ref 136–145)
SODIUM SERPL-SCNC: 134 MMOL/L (ref 136–145)
SODIUM SERPL-SCNC: 134 MMOL/L (ref 136–145)
SODIUM SERPL-SCNC: 135 MMOL/L (ref 136–145)
SODIUM SERPL-SCNC: 137 MMOL/L (ref 134–144)
SODIUM SERPL-SCNC: 137 MMOL/L (ref 136–145)
SODIUM SERPL-SCNC: 139 MMOL/L (ref 136–145)
SODIUM SERPL-SCNC: 139 MMOL/L (ref 136–145)
SODIUM SERPL-SCNC: 140 MMOL/L (ref 136–145)
SODIUM SERPL-SCNC: 141 MMOL/L (ref 136–145)
SODIUM SERPL-SCNC: 142 MMOL/L (ref 136–145)
SODIUM SERPL-SCNC: 143 MMOL/L (ref 136–145)
SP GR UR REFRACTOMETRY: 1 (ref 1–1.03)
SP GR UR REFRACTOMETRY: 1.01 (ref 1–1.03)
SP GR UR REFRACTOMETRY: 1.02 (ref 1–1.03)
SPECIMEN SITE: ABNORMAL
SPECIMEN SOURCE FLD: ABNORMAL
SPECIMEN SOURCE FLD: NORMAL
THERAPEUTIC RANGE,PTTT: NORMAL SECS (ref 58–77)
TIBC SERPL-MCNC: 175 UG/DL (ref 250–450)
UA: UC IF INDICATED,UAUC: ABNORMAL
UA: UC IF INDICATED,UAUC: ABNORMAL
UR CULT HOLD, URHOLD: NORMAL
UROBILINOGEN UR QL STRIP.AUTO: 1 EU/DL (ref 0.2–1)
VENTILATION MODE VENT: ABNORMAL
VENTRICULAR RATE, ECG03: 102 BPM
VENTRICULAR RATE, ECG03: 137 BPM
VIT B12 SERPL-MCNC: 728 PG/ML (ref 193–986)
WBC # BLD AUTO: 12.7 X10E3/UL (ref 3.4–10.8)
WBC # BLD AUTO: 14.1 K/UL (ref 4.1–11.1)
WBC # BLD AUTO: 20.5 K/UL (ref 4.1–11.1)
WBC # BLD AUTO: 21.7 K/UL (ref 4.1–11.1)
WBC # BLD AUTO: 23.5 K/UL (ref 4.1–11.1)
WBC # BLD AUTO: 27.4 K/UL (ref 4.1–11.1)
WBC # BLD AUTO: 29.3 K/UL (ref 4.1–11.1)
WBC # BLD AUTO: 3.6 K/UL (ref 4.1–11.1)
WBC # BLD AUTO: 4.8 K/UL (ref 4.1–11.1)
WBC # BLD AUTO: 5.6 K/UL (ref 4.1–11.1)
WBC # BLD AUTO: 6.8 K/UL (ref 4.1–11.1)
WBC # BLD AUTO: 7.9 K/UL (ref 4.1–11.1)
WBC # BLD AUTO: 8.9 K/UL (ref 4.1–11.1)
WBC # BLD AUTO: 9.3 K/UL (ref 4.1–11.1)
WBC URNS QL MICRO: >100 /HPF (ref 0–4)
WBC URNS QL MICRO: ABNORMAL /HPF (ref 0–4)

## 2019-01-01 PROCEDURE — 3331090001 HH PPS REVENUE CREDIT

## 2019-01-01 PROCEDURE — 3331090002 HH PPS REVENUE DEBIT

## 2019-01-01 PROCEDURE — G0153 HHCP-SVS OF S/L PATH,EA 15MN: HCPCS

## 2019-01-01 PROCEDURE — 74011250637 HC RX REV CODE- 250/637: Performed by: INTERNAL MEDICINE

## 2019-01-01 PROCEDURE — G0300 HHS/HOSPICE OF LPN EA 15 MIN: HCPCS

## 2019-01-01 PROCEDURE — 92526 ORAL FUNCTION THERAPY: CPT

## 2019-01-01 PROCEDURE — 82042 OTHER SOURCE ALBUMIN QUAN EA: CPT

## 2019-01-01 PROCEDURE — 74018 RADEX ABDOMEN 1 VIEW: CPT

## 2019-01-01 PROCEDURE — 83735 ASSAY OF MAGNESIUM: CPT

## 2019-01-01 PROCEDURE — 76450000000

## 2019-01-01 PROCEDURE — 85730 THROMBOPLASTIN TIME PARTIAL: CPT

## 2019-01-01 PROCEDURE — 74011250636 HC RX REV CODE- 250/636: Performed by: INTERNAL MEDICINE

## 2019-01-01 PROCEDURE — 74011000258 HC RX REV CODE- 258: Performed by: UROLOGY

## 2019-01-01 PROCEDURE — 400014 HH F/U

## 2019-01-01 PROCEDURE — 77030018786 HC NDL GD F/USND BARD -B

## 2019-01-01 PROCEDURE — 74177 CT ABD & PELVIS W/CONTRAST: CPT

## 2019-01-01 PROCEDURE — C1769 GUIDE WIRE: HCPCS | Performed by: UROLOGY

## 2019-01-01 PROCEDURE — A6213 FOAM DRG >16<=48 SQ IN W/BDR: HCPCS

## 2019-01-01 PROCEDURE — 74230 X-RAY XM SWLNG FUNCJ C+: CPT

## 2019-01-01 PROCEDURE — 65270000029 HC RM PRIVATE

## 2019-01-01 PROCEDURE — 0DH68UZ INSERTION OF FEEDING DEVICE INTO STOMACH, VIA NATURAL OR ARTIFICIAL OPENING ENDOSCOPIC: ICD-10-PCS | Performed by: INTERNAL MEDICINE

## 2019-01-01 PROCEDURE — 94760 N-INVAS EAR/PLS OXIMETRY 1: CPT

## 2019-01-01 PROCEDURE — 85027 COMPLETE CBC AUTOMATED: CPT

## 2019-01-01 PROCEDURE — 96372 THER/PROPH/DIAG INJ SC/IM: CPT

## 2019-01-01 PROCEDURE — 77030018798 HC PMP KT ENTRL FED COVD -A

## 2019-01-01 PROCEDURE — 74011250636 HC RX REV CODE- 250/636: Performed by: UROLOGY

## 2019-01-01 PROCEDURE — C1751 CATH, INF, PER/CENT/MIDLINE: HCPCS

## 2019-01-01 PROCEDURE — 82962 GLUCOSE BLOOD TEST: CPT

## 2019-01-01 PROCEDURE — 74011000258 HC RX REV CODE- 258: Performed by: INTERNAL MEDICINE

## 2019-01-01 PROCEDURE — 36415 COLL VENOUS BLD VENIPUNCTURE: CPT

## 2019-01-01 PROCEDURE — 77030026438 HC STYL ET INTUB CARD -A: Performed by: NURSE ANESTHETIST, CERTIFIED REGISTERED

## 2019-01-01 PROCEDURE — C9113 INJ PANTOPRAZOLE SODIUM, VIA: HCPCS | Performed by: INTERNAL MEDICINE

## 2019-01-01 PROCEDURE — 81001 URINALYSIS AUTO W/SCOPE: CPT

## 2019-01-01 PROCEDURE — 84100 ASSAY OF PHOSPHORUS: CPT

## 2019-01-01 PROCEDURE — 74011250637 HC RX REV CODE- 250/637: Performed by: FAMILY MEDICINE

## 2019-01-01 PROCEDURE — BT141ZZ FLUOROSCOPY OF KIDNEYS, URETERS AND BLADDER USING LOW OSMOLAR CONTRAST: ICD-10-PCS | Performed by: UROLOGY

## 2019-01-01 PROCEDURE — 65660000000 HC RM CCU STEPDOWN

## 2019-01-01 PROCEDURE — 71045 X-RAY EXAM CHEST 1 VIEW: CPT

## 2019-01-01 PROCEDURE — 71250 CT THORAX DX C-: CPT

## 2019-01-01 PROCEDURE — 87086 URINE CULTURE/COLONY COUNT: CPT

## 2019-01-01 PROCEDURE — 77030019563 HC DEV ATTCH FEED HOLL -A

## 2019-01-01 PROCEDURE — 74011250636 HC RX REV CODE- 250/636

## 2019-01-01 PROCEDURE — 93005 ELECTROCARDIOGRAM TRACING: CPT

## 2019-01-01 PROCEDURE — A5120 SKIN BARRIER, WIPE OR SWAB: HCPCS

## 2019-01-01 PROCEDURE — 74011250637 HC RX REV CODE- 250/637: Performed by: UROLOGY

## 2019-01-01 PROCEDURE — 74011250636 HC RX REV CODE- 250/636: Performed by: FAMILY MEDICINE

## 2019-01-01 PROCEDURE — 83010 ASSAY OF HAPTOGLOBIN QUANT: CPT

## 2019-01-01 PROCEDURE — 87040 BLOOD CULTURE FOR BACTERIA: CPT

## 2019-01-01 PROCEDURE — 85045 AUTOMATED RETICULOCYTE COUNT: CPT

## 2019-01-01 PROCEDURE — C1729 CATH, DRAINAGE: HCPCS

## 2019-01-01 PROCEDURE — 83690 ASSAY OF LIPASE: CPT

## 2019-01-01 PROCEDURE — 77030011943

## 2019-01-01 PROCEDURE — 80053 COMPREHEN METABOLIC PANEL: CPT

## 2019-01-01 PROCEDURE — 77030015696

## 2019-01-01 PROCEDURE — 83615 LACTATE (LD) (LDH) ENZYME: CPT

## 2019-01-01 PROCEDURE — 74011636320 HC RX REV CODE- 636/320: Performed by: RADIOLOGY

## 2019-01-01 PROCEDURE — 96361 HYDRATE IV INFUSION ADD-ON: CPT

## 2019-01-01 PROCEDURE — 3331090003 HH PPS REVENUE ADJ

## 2019-01-01 PROCEDURE — 85025 COMPLETE CBC W/AUTO DIFF WBC: CPT

## 2019-01-01 PROCEDURE — 77030020186 HC BOOT HL PROTCT SAGE -B

## 2019-01-01 PROCEDURE — 0W9B3ZZ DRAINAGE OF LEFT PLEURAL CAVITY, PERCUTANEOUS APPROACH: ICD-10-PCS | Performed by: RADIOLOGY

## 2019-01-01 PROCEDURE — 82728 ASSAY OF FERRITIN: CPT

## 2019-01-01 PROCEDURE — G0299 HHS/HOSPICE OF RN EA 15 MIN: HCPCS

## 2019-01-01 PROCEDURE — 96376 TX/PRO/DX INJ SAME DRUG ADON: CPT

## 2019-01-01 PROCEDURE — G0152 HHCP-SERV OF OT,EA 15 MIN: HCPCS

## 2019-01-01 PROCEDURE — 74011000250 HC RX REV CODE- 250

## 2019-01-01 PROCEDURE — 74011000250 HC RX REV CODE- 250: Performed by: EMERGENCY MEDICINE

## 2019-01-01 PROCEDURE — 77030031415 HC TU FEED NG DOBHF COVD -A

## 2019-01-01 PROCEDURE — 77010033711 HC HIGH FLOW OXYGEN

## 2019-01-01 PROCEDURE — 80048 BASIC METABOLIC PNL TOTAL CA: CPT

## 2019-01-01 PROCEDURE — 82607 VITAMIN B-12: CPT

## 2019-01-01 PROCEDURE — 77030019927 HC TBNG IRR CYSTO BAXT -A: Performed by: UROLOGY

## 2019-01-01 PROCEDURE — 74011000250 HC RX REV CODE- 250: Performed by: INTERNAL MEDICINE

## 2019-01-01 PROCEDURE — 76040000019: Performed by: INTERNAL MEDICINE

## 2019-01-01 PROCEDURE — 74176 CT ABD & PELVIS W/O CONTRAST: CPT

## 2019-01-01 PROCEDURE — 77030034696 HC CATH URETH FOL 2W BARD -A: Performed by: UROLOGY

## 2019-01-01 PROCEDURE — 92611 MOTION FLUOROSCOPY/SWALLOW: CPT

## 2019-01-01 PROCEDURE — 74011250636 HC RX REV CODE- 250/636: Performed by: EMERGENCY MEDICINE

## 2019-01-01 PROCEDURE — 96374 THER/PROPH/DIAG INJ IV PUSH: CPT

## 2019-01-01 PROCEDURE — 99285 EMERGENCY DEPT VISIT HI MDM: CPT

## 2019-01-01 PROCEDURE — 83605 ASSAY OF LACTIC ACID: CPT

## 2019-01-01 PROCEDURE — 76060000031 HC ANESTHESIA FIRST 0.5 HR: Performed by: INTERNAL MEDICINE

## 2019-01-01 PROCEDURE — 36600 WITHDRAWAL OF ARTERIAL BLOOD: CPT

## 2019-01-01 PROCEDURE — 82803 BLOOD GASES ANY COMBINATION: CPT

## 2019-01-01 PROCEDURE — 82746 ASSAY OF FOLIC ACID SERUM: CPT

## 2019-01-01 PROCEDURE — 77030032490 HC SLV COMPR SCD KNE COVD -B

## 2019-01-01 PROCEDURE — 83036 HEMOGLOBIN GLYCOSYLATED A1C: CPT

## 2019-01-01 PROCEDURE — 80069 RENAL FUNCTION PANEL: CPT

## 2019-01-01 PROCEDURE — 85610 PROTHROMBIN TIME: CPT

## 2019-01-01 PROCEDURE — 77030037759

## 2019-01-01 PROCEDURE — 77030018832 HC SOL IRR H20 ICUM -A: Performed by: UROLOGY

## 2019-01-01 PROCEDURE — 65610000006 HC RM INTENSIVE CARE

## 2019-01-01 PROCEDURE — 87077 CULTURE AEROBIC IDENTIFY: CPT

## 2019-01-01 PROCEDURE — G0155 HHCP-SVS OF CSW,EA 15 MIN: HCPCS

## 2019-01-01 PROCEDURE — 87205 SMEAR GRAM STAIN: CPT

## 2019-01-01 PROCEDURE — 77030041247 HC PROTECTOR HEEL HEELMEDIX MDII -B

## 2019-01-01 PROCEDURE — 87186 SC STD MICRODIL/AGAR DIL: CPT

## 2019-01-01 PROCEDURE — 77030011641 HC PASTE OST ADH BMS -A

## 2019-01-01 PROCEDURE — 87102 FUNGUS ISOLATION CULTURE: CPT

## 2019-01-01 PROCEDURE — 0TBB8ZX EXCISION OF BLADDER, VIA NATURAL OR ARTIFICIAL OPENING ENDOSCOPIC, DIAGNOSTIC: ICD-10-PCS | Performed by: UROLOGY

## 2019-01-01 PROCEDURE — C1758 CATHETER, URETERAL: HCPCS | Performed by: UROLOGY

## 2019-01-01 PROCEDURE — 76060000033 HC ANESTHESIA 1 TO 1.5 HR: Performed by: UROLOGY

## 2019-01-01 PROCEDURE — 51702 INSERT TEMP BLADDER CATH: CPT

## 2019-01-01 PROCEDURE — 77030020782 HC GWN BAIR PAWS FLX 3M -B

## 2019-01-01 PROCEDURE — 74011636320 HC RX REV CODE- 636/320: Performed by: UROLOGY

## 2019-01-01 PROCEDURE — 83540 ASSAY OF IRON: CPT

## 2019-01-01 PROCEDURE — A4649 SURGICAL SUPPLIES: HCPCS

## 2019-01-01 PROCEDURE — G0151 HHCP-SERV OF PT,EA 15 MIN: HCPCS

## 2019-01-01 PROCEDURE — 77030008684 HC TU ET CUF COVD -B: Performed by: NURSE ANESTHETIST, CERTIFIED REGISTERED

## 2019-01-01 PROCEDURE — 77030005513 HC CATH URETH FOL11 MDII -B

## 2019-01-01 PROCEDURE — 88305 TISSUE EXAM BY PATHOLOGIST: CPT

## 2019-01-01 PROCEDURE — 92610 EVALUATE SWALLOWING FUNCTION: CPT

## 2019-01-01 PROCEDURE — 76210000006 HC OR PH I REC 0.5 TO 1 HR: Performed by: UROLOGY

## 2019-01-01 PROCEDURE — 77030005122 HC CATH GASTMY PEG BSC -B: Performed by: INTERNAL MEDICINE

## 2019-01-01 PROCEDURE — 87116 MYCOBACTERIA CULTURE: CPT

## 2019-01-01 PROCEDURE — 32555 ASPIRATE PLEURA W/ IMAGING: CPT

## 2019-01-01 PROCEDURE — 77010033678 HC OXYGEN DAILY

## 2019-01-01 PROCEDURE — 74011000258 HC RX REV CODE- 258: Performed by: EMERGENCY MEDICINE

## 2019-01-01 PROCEDURE — 93922 UPR/L XTREMITY ART 2 LEVELS: CPT

## 2019-01-01 PROCEDURE — 76937 US GUIDE VASCULAR ACCESS: CPT

## 2019-01-01 PROCEDURE — 89050 BODY FLUID CELL COUNT: CPT

## 2019-01-01 PROCEDURE — 84157 ASSAY OF PROTEIN OTHER: CPT

## 2019-01-01 PROCEDURE — 96365 THER/PROPH/DIAG IV INF INIT: CPT

## 2019-01-01 PROCEDURE — 76010000149 HC OR TIME 1 TO 1.5 HR: Performed by: UROLOGY

## 2019-01-01 PROCEDURE — 88112 CYTOPATH CELL ENHANCE TECH: CPT

## 2019-01-01 PROCEDURE — 0TCB8ZZ EXTIRPATION OF MATTER FROM BLADDER, VIA NATURAL OR ARTIFICIAL OPENING ENDOSCOPIC: ICD-10-PCS | Performed by: UROLOGY

## 2019-01-01 PROCEDURE — 74011000250 HC RX REV CODE- 250: Performed by: RADIOLOGY

## 2019-01-01 PROCEDURE — 88307 TISSUE EXAM BY PATHOLOGIST: CPT

## 2019-01-01 PROCEDURE — 96375 TX/PRO/DX INJ NEW DRUG ADDON: CPT

## 2019-01-01 RX ORDER — ERGOCALCIFEROL 1.25 MG/1
50000 CAPSULE ORAL
COMMUNITY

## 2019-01-01 RX ORDER — SODIUM CHLORIDE 0.9 % (FLUSH) 0.9 %
5-40 SYRINGE (ML) INJECTION EVERY 8 HOURS
Status: DISCONTINUED | OUTPATIENT
Start: 2019-01-01 | End: 2019-01-01 | Stop reason: HOSPADM

## 2019-01-01 RX ORDER — SODIUM CHLORIDE, SODIUM LACTATE, POTASSIUM CHLORIDE, CALCIUM CHLORIDE 600; 310; 30; 20 MG/100ML; MG/100ML; MG/100ML; MG/100ML
125 INJECTION, SOLUTION INTRAVENOUS CONTINUOUS
Status: DISCONTINUED | OUTPATIENT
Start: 2019-01-01 | End: 2019-01-01 | Stop reason: HOSPADM

## 2019-01-01 RX ORDER — ACETAMINOPHEN 325 MG/1
650 TABLET ORAL
Status: DISCONTINUED | OUTPATIENT
Start: 2019-01-01 | End: 2019-01-01 | Stop reason: HOSPADM

## 2019-01-01 RX ORDER — LEVOFLOXACIN 750 MG/1
750 TABLET ORAL DAILY
Qty: 4 TAB | Refills: 0 | Status: SHIPPED | OUTPATIENT
Start: 2019-01-01 | End: 2019-01-01 | Stop reason: SDUPTHER

## 2019-01-01 RX ORDER — ERGOCALCIFEROL 1.25 MG/1
50000 CAPSULE ORAL
Status: DISCONTINUED | OUTPATIENT
Start: 2019-01-01 | End: 2019-01-01

## 2019-01-01 RX ORDER — POTASSIUM CHLORIDE 29.8 MG/ML
20 INJECTION INTRAVENOUS
Status: COMPLETED | OUTPATIENT
Start: 2019-01-01 | End: 2019-01-01

## 2019-01-01 RX ORDER — OXYBUTYNIN CHLORIDE 5 MG/1
5 TABLET ORAL
Status: DISCONTINUED | OUTPATIENT
Start: 2019-01-01 | End: 2019-01-01

## 2019-01-01 RX ORDER — HYDROMORPHONE HYDROCHLORIDE 1 MG/ML
0.5 INJECTION, SOLUTION INTRAMUSCULAR; INTRAVENOUS; SUBCUTANEOUS ONCE
Status: DISCONTINUED | OUTPATIENT
Start: 2019-01-01 | End: 2019-01-01

## 2019-01-01 RX ORDER — ONDANSETRON 2 MG/ML
4 INJECTION INTRAMUSCULAR; INTRAVENOUS
Status: DISCONTINUED | OUTPATIENT
Start: 2019-01-01 | End: 2019-01-01 | Stop reason: HOSPADM

## 2019-01-01 RX ORDER — PROPOFOL 10 MG/ML
INJECTION, EMULSION INTRAVENOUS AS NEEDED
Status: DISCONTINUED | OUTPATIENT
Start: 2019-01-01 | End: 2019-01-01 | Stop reason: HOSPADM

## 2019-01-01 RX ORDER — LIDOCAINE HYDROCHLORIDE 10 MG/ML
10 INJECTION INFILTRATION; PERINEURAL
Status: COMPLETED | OUTPATIENT
Start: 2019-01-01 | End: 2019-01-01

## 2019-01-01 RX ORDER — EPINEPHRINE 0.1 MG/ML
1 INJECTION INTRACARDIAC; INTRAVENOUS
Status: DISCONTINUED | OUTPATIENT
Start: 2019-01-01 | End: 2019-01-01 | Stop reason: HOSPADM

## 2019-01-01 RX ORDER — HYDROMORPHONE HYDROCHLORIDE 1 MG/ML
0.5 INJECTION, SOLUTION INTRAMUSCULAR; INTRAVENOUS; SUBCUTANEOUS
Status: COMPLETED | OUTPATIENT
Start: 2019-01-01 | End: 2019-01-01

## 2019-01-01 RX ORDER — ERGOCALCIFEROL 1.25 MG/1
50000 CAPSULE ORAL
Qty: 15 CAP | Refills: 3 | Status: SHIPPED | OUTPATIENT
Start: 2019-01-01 | End: 2019-01-01

## 2019-01-01 RX ORDER — LEVOFLOXACIN 750 MG/1
750 TABLET ORAL DAILY
Qty: 2 TAB | Refills: 0 | Status: SHIPPED | OUTPATIENT
Start: 2019-01-01 | End: 2019-01-01

## 2019-01-01 RX ORDER — FLUMAZENIL 0.1 MG/ML
0.2 INJECTION INTRAVENOUS
Status: CANCELLED | OUTPATIENT
Start: 2019-01-01

## 2019-01-01 RX ORDER — KETOROLAC TROMETHAMINE 30 MG/ML
15 INJECTION, SOLUTION INTRAMUSCULAR; INTRAVENOUS
Status: COMPLETED | OUTPATIENT
Start: 2019-01-01 | End: 2019-01-01

## 2019-01-01 RX ORDER — TRAMADOL HYDROCHLORIDE 50 MG/1
100 TABLET ORAL
COMMUNITY

## 2019-01-01 RX ORDER — METRONIDAZOLE 500 MG/100ML
500 INJECTION, SOLUTION INTRAVENOUS EVERY 12 HOURS
Status: DISCONTINUED | OUTPATIENT
Start: 2019-01-01 | End: 2019-01-01

## 2019-01-01 RX ORDER — SODIUM CHLORIDE, SODIUM LACTATE, POTASSIUM CHLORIDE, CALCIUM CHLORIDE 600; 310; 30; 20 MG/100ML; MG/100ML; MG/100ML; MG/100ML
INJECTION, SOLUTION INTRAVENOUS
Status: DISCONTINUED | OUTPATIENT
Start: 2019-01-01 | End: 2019-01-01 | Stop reason: HOSPADM

## 2019-01-01 RX ORDER — DEXTROMETHORPHAN/PSEUDOEPHED 2.5-7.5/.8
1.2 DROPS ORAL
Status: DISCONTINUED | OUTPATIENT
Start: 2019-01-01 | End: 2019-01-01 | Stop reason: HOSPADM

## 2019-01-01 RX ORDER — TRAMADOL HYDROCHLORIDE 50 MG/1
75 TABLET ORAL
Status: DISCONTINUED | OUTPATIENT
Start: 2019-01-01 | End: 2019-01-01

## 2019-01-01 RX ORDER — ENOXAPARIN SODIUM 100 MG/ML
40 INJECTION SUBCUTANEOUS EVERY 24 HOURS
Status: DISCONTINUED | OUTPATIENT
Start: 2019-01-01 | End: 2019-01-01

## 2019-01-01 RX ORDER — NALOXONE HYDROCHLORIDE 0.4 MG/ML
0.4 INJECTION, SOLUTION INTRAMUSCULAR; INTRAVENOUS; SUBCUTANEOUS AS NEEDED
Status: DISCONTINUED | OUTPATIENT
Start: 2019-01-01 | End: 2019-01-01 | Stop reason: HOSPADM

## 2019-01-01 RX ORDER — LORAZEPAM 2 MG/ML
1 INJECTION INTRAMUSCULAR
Status: DISCONTINUED | OUTPATIENT
Start: 2019-01-01 | End: 2019-01-01

## 2019-01-01 RX ORDER — LIDOCAINE HYDROCHLORIDE 20 MG/ML
INJECTION, SOLUTION EPIDURAL; INFILTRATION; INTRACAUDAL; PERINEURAL AS NEEDED
Status: DISCONTINUED | OUTPATIENT
Start: 2019-01-01 | End: 2019-01-01 | Stop reason: HOSPADM

## 2019-01-01 RX ORDER — NOREPINEPHRINE BITARTRATE/D5W 8 MG/250ML
0-16 PLASTIC BAG, INJECTION (ML) INTRAVENOUS
Status: DISCONTINUED | OUTPATIENT
Start: 2019-01-01 | End: 2019-01-01 | Stop reason: SDUPTHER

## 2019-01-01 RX ORDER — ATROPINE SULFATE 0.1 MG/ML
0.4 INJECTION INTRAVENOUS
Status: DISCONTINUED | OUTPATIENT
Start: 2019-01-01 | End: 2019-01-01 | Stop reason: HOSPADM

## 2019-01-01 RX ORDER — MORPHINE SULFATE 2 MG/ML
2 INJECTION, SOLUTION INTRAMUSCULAR; INTRAVENOUS
Status: DISCONTINUED | OUTPATIENT
Start: 2019-01-01 | End: 2019-01-01

## 2019-01-01 RX ORDER — TRAMADOL HYDROCHLORIDE 50 MG/1
25 TABLET ORAL
Qty: 15 TAB | Refills: 0 | OUTPATIENT
Start: 2019-01-01 | End: 2019-01-01 | Stop reason: SDUPTHER

## 2019-01-01 RX ORDER — DEXTROSE MONOHYDRATE AND SODIUM CHLORIDE 5; .45 G/100ML; G/100ML
75 INJECTION, SOLUTION INTRAVENOUS CONTINUOUS
Status: DISCONTINUED | OUTPATIENT
Start: 2019-01-01 | End: 2019-01-01

## 2019-01-01 RX ORDER — MORPHINE SULFATE 2 MG/ML
2 INJECTION, SOLUTION INTRAMUSCULAR; INTRAVENOUS
Status: DISCONTINUED | OUTPATIENT
Start: 2019-01-01 | End: 2019-01-01 | Stop reason: HOSPADM

## 2019-01-01 RX ORDER — POTASSIUM CHLORIDE 29.8 MG/ML
20 INJECTION INTRAVENOUS DAILY
Status: DISCONTINUED | OUTPATIENT
Start: 2019-01-01 | End: 2019-01-01

## 2019-01-01 RX ORDER — ONDANSETRON 2 MG/ML
INJECTION INTRAMUSCULAR; INTRAVENOUS AS NEEDED
Status: DISCONTINUED | OUTPATIENT
Start: 2019-01-01 | End: 2019-01-01 | Stop reason: HOSPADM

## 2019-01-01 RX ORDER — MICONAZOLE NITRATE 2 %
1 POWDER (GRAM) TOPICAL 2 TIMES WEEKLY
COMMUNITY

## 2019-01-01 RX ORDER — HEPARIN SODIUM 5000 [USP'U]/ML
5000 INJECTION, SOLUTION INTRAVENOUS; SUBCUTANEOUS EVERY 8 HOURS
Status: DISCONTINUED | OUTPATIENT
Start: 2019-01-01 | End: 2019-01-01 | Stop reason: HOSPADM

## 2019-01-01 RX ORDER — POTASSIUM CHLORIDE 7.45 MG/ML
10 INJECTION INTRAVENOUS
Status: COMPLETED | OUTPATIENT
Start: 2019-01-01 | End: 2019-01-01

## 2019-01-01 RX ORDER — HYDROMORPHONE HYDROCHLORIDE 1 MG/ML
.25-1 INJECTION, SOLUTION INTRAMUSCULAR; INTRAVENOUS; SUBCUTANEOUS
Status: DISCONTINUED | OUTPATIENT
Start: 2019-01-01 | End: 2019-01-01 | Stop reason: HOSPADM

## 2019-01-01 RX ORDER — HYDROMORPHONE HYDROCHLORIDE 2 MG/ML
0.5 INJECTION, SOLUTION INTRAMUSCULAR; INTRAVENOUS; SUBCUTANEOUS ONCE
Status: COMPLETED | OUTPATIENT
Start: 2019-01-01 | End: 2019-01-01

## 2019-01-01 RX ORDER — SODIUM CHLORIDE 9 MG/ML
50 INJECTION, SOLUTION INTRAVENOUS CONTINUOUS
Status: DISPENSED | OUTPATIENT
Start: 2019-01-01 | End: 2019-01-01

## 2019-01-01 RX ORDER — TRAMADOL HYDROCHLORIDE 50 MG/1
TABLET ORAL
Qty: 15 TAB | Refills: 0 | OUTPATIENT
Start: 2019-01-01 | End: 2019-01-01

## 2019-01-01 RX ORDER — FACIAL-BODY WIPES
10 EACH TOPICAL 2 TIMES WEEKLY
COMMUNITY

## 2019-01-01 RX ORDER — SODIUM CHLORIDE 0.9 % (FLUSH) 0.9 %
5-10 SYRINGE (ML) INJECTION AS NEEDED
Status: DISCONTINUED | OUTPATIENT
Start: 2019-01-01 | End: 2019-01-01 | Stop reason: HOSPADM

## 2019-01-01 RX ORDER — NITROFURANTOIN 25; 75 MG/1; MG/1
100 CAPSULE ORAL 2 TIMES DAILY
Qty: 14 CAP | Refills: 0 | Status: SHIPPED | OUTPATIENT
Start: 2019-01-01 | End: 2019-01-01

## 2019-01-01 RX ORDER — BACLOFEN 10 MG/1
2.5 TABLET ORAL 3 TIMES DAILY
Status: DISCONTINUED | OUTPATIENT
Start: 2019-01-01 | End: 2019-01-01 | Stop reason: HOSPADM

## 2019-01-01 RX ORDER — SODIUM CHLORIDE 0.9 % (FLUSH) 0.9 %
5-40 SYRINGE (ML) INJECTION AS NEEDED
Status: DISCONTINUED | OUTPATIENT
Start: 2019-01-01 | End: 2019-01-01 | Stop reason: HOSPADM

## 2019-01-01 RX ORDER — OXYBUTYNIN CHLORIDE 5 MG/5ML
5 SYRUP ORAL 3 TIMES DAILY
COMMUNITY

## 2019-01-01 RX ORDER — ERGOCALCIFEROL 1.25 MG/1
50000 CAPSULE ORAL
Qty: 6 CAP | Refills: 0 | Status: SHIPPED | OUTPATIENT
Start: 2019-01-01 | End: 2019-01-01

## 2019-01-01 RX ORDER — OXYBUTYNIN CHLORIDE 5 MG/1
5 TABLET ORAL 3 TIMES DAILY
Status: DISCONTINUED | OUTPATIENT
Start: 2019-01-01 | End: 2019-01-01

## 2019-01-01 RX ORDER — ONDANSETRON 2 MG/ML
4 INJECTION INTRAMUSCULAR; INTRAVENOUS ONCE
Status: COMPLETED | OUTPATIENT
Start: 2019-01-01 | End: 2019-01-01

## 2019-01-01 RX ORDER — MIDAZOLAM HYDROCHLORIDE 1 MG/ML
INJECTION, SOLUTION INTRAMUSCULAR; INTRAVENOUS AS NEEDED
Status: DISCONTINUED | OUTPATIENT
Start: 2019-01-01 | End: 2019-01-01 | Stop reason: HOSPADM

## 2019-01-01 RX ORDER — OXYBUTYNIN CHLORIDE 5 MG/1
5 TABLET ORAL 3 TIMES DAILY
Status: DISCONTINUED | OUTPATIENT
Start: 2019-01-01 | End: 2019-01-01 | Stop reason: HOSPADM

## 2019-01-01 RX ORDER — SODIUM CHLORIDE 9 MG/ML
125 INJECTION, SOLUTION INTRAVENOUS CONTINUOUS
Status: DISCONTINUED | OUTPATIENT
Start: 2019-01-01 | End: 2019-01-01

## 2019-01-01 RX ORDER — SODIUM CHLORIDE, SODIUM LACTATE, POTASSIUM CHLORIDE, CALCIUM CHLORIDE 600; 310; 30; 20 MG/100ML; MG/100ML; MG/100ML; MG/100ML
125 INJECTION, SOLUTION INTRAVENOUS CONTINUOUS
Status: CANCELLED | OUTPATIENT
Start: 2019-01-01 | End: 2019-01-01

## 2019-01-01 RX ORDER — BACLOFEN 10 MG/1
TABLET ORAL
Qty: 270 TAB | Refills: 2 | Status: SHIPPED | OUTPATIENT
Start: 2019-01-01

## 2019-01-01 RX ORDER — TRAMADOL HYDROCHLORIDE 50 MG/1
25 TABLET ORAL
Qty: 30 TAB | Refills: 1 | OUTPATIENT
Start: 2019-01-01 | End: 2019-01-01

## 2019-01-01 RX ORDER — FLUMAZENIL 0.1 MG/ML
0.2 INJECTION INTRAVENOUS
Status: DISCONTINUED | OUTPATIENT
Start: 2019-01-01 | End: 2019-01-01 | Stop reason: HOSPADM

## 2019-01-01 RX ORDER — OXYBUTYNIN CHLORIDE 5 MG/5ML
5 SYRUP ORAL 3 TIMES DAILY
Qty: 450 ML | Refills: 3 | Status: SHIPPED | OUTPATIENT
Start: 2019-01-01 | End: 2019-01-01 | Stop reason: SDUPTHER

## 2019-01-01 RX ORDER — LORAZEPAM 2 MG/ML
1 INJECTION INTRAMUSCULAR
Status: DISCONTINUED | OUTPATIENT
Start: 2019-01-01 | End: 2019-01-01 | Stop reason: HOSPADM

## 2019-01-01 RX ORDER — BACLOFEN 10 MG/1
2.5 TABLET ORAL 3 TIMES DAILY
Status: DISCONTINUED | OUTPATIENT
Start: 2019-01-01 | End: 2019-01-01

## 2019-01-01 RX ORDER — BACLOFEN 10 MG/1
10 TABLET ORAL 3 TIMES DAILY
Status: DISCONTINUED | OUTPATIENT
Start: 2019-01-01 | End: 2019-01-01

## 2019-01-01 RX ORDER — FLUCONAZOLE 200 MG/1
200 TABLET ORAL DAILY
Qty: 14 TAB | Refills: 0 | Status: SHIPPED | OUTPATIENT
Start: 2019-01-01 | End: 2019-01-01

## 2019-01-01 RX ORDER — MIDAZOLAM HYDROCHLORIDE 1 MG/ML
.25-5 INJECTION, SOLUTION INTRAMUSCULAR; INTRAVENOUS
Status: DISCONTINUED | OUTPATIENT
Start: 2019-01-01 | End: 2019-01-01 | Stop reason: HOSPADM

## 2019-01-01 RX ORDER — FACIAL-BODY WIPES
10 EACH TOPICAL ONCE
Status: COMPLETED | OUTPATIENT
Start: 2019-01-01 | End: 2019-01-01

## 2019-01-01 RX ORDER — HYDROMORPHONE HYDROCHLORIDE 1 MG/ML
1 INJECTION, SOLUTION INTRAMUSCULAR; INTRAVENOUS; SUBCUTANEOUS ONCE
Status: COMPLETED | OUTPATIENT
Start: 2019-01-01 | End: 2019-01-01

## 2019-01-01 RX ORDER — TRAMADOL HYDROCHLORIDE 50 MG/1
100 TABLET ORAL
Status: DISPENSED | OUTPATIENT
Start: 2019-01-01 | End: 2019-01-01

## 2019-01-01 RX ORDER — ERGOCALCIFEROL 1.25 MG/1
50000 CAPSULE ORAL
COMMUNITY
End: 2019-01-01 | Stop reason: SDUPTHER

## 2019-01-01 RX ORDER — PANTOPRAZOLE SODIUM 40 MG/1
40 TABLET, DELAYED RELEASE ORAL
Status: DISCONTINUED | OUTPATIENT
Start: 2019-01-01 | End: 2019-01-01

## 2019-01-01 RX ORDER — LIDOCAINE HYDROCHLORIDE 10 MG/ML
0.1 INJECTION, SOLUTION EPIDURAL; INFILTRATION; INTRACAUDAL; PERINEURAL AS NEEDED
Status: CANCELLED | OUTPATIENT
Start: 2019-01-01

## 2019-01-01 RX ORDER — NALOXONE HYDROCHLORIDE 0.4 MG/ML
0.2 INJECTION, SOLUTION INTRAMUSCULAR; INTRAVENOUS; SUBCUTANEOUS
Status: CANCELLED | OUTPATIENT
Start: 2019-01-01

## 2019-01-01 RX ORDER — FACIAL-BODY WIPES
10 EACH TOPICAL
Status: DISCONTINUED | OUTPATIENT
Start: 2019-01-01 | End: 2019-01-01

## 2019-01-01 RX ORDER — POTASSIUM CHLORIDE 7.45 MG/ML
10 INJECTION INTRAVENOUS
Status: DISCONTINUED | OUTPATIENT
Start: 2019-01-01 | End: 2019-01-01

## 2019-01-01 RX ORDER — TRAMADOL HYDROCHLORIDE 50 MG/1
75 TABLET ORAL
Status: DISCONTINUED | OUTPATIENT
Start: 2019-01-01 | End: 2019-01-01 | Stop reason: HOSPADM

## 2019-01-01 RX ORDER — FACIAL-BODY WIPES
10 EACH TOPICAL DAILY PRN
Status: DISCONTINUED | OUTPATIENT
Start: 2019-01-01 | End: 2019-01-01 | Stop reason: HOSPADM

## 2019-01-01 RX ORDER — EPHEDRINE SULFATE/0.9% NACL/PF 50 MG/5 ML
SYRINGE (ML) INTRAVENOUS AS NEEDED
Status: DISCONTINUED | OUTPATIENT
Start: 2019-01-01 | End: 2019-01-01 | Stop reason: HOSPADM

## 2019-01-01 RX ORDER — NOREPINEPHRINE BITARTRATE/D5W 8 MG/250ML
0-16 PLASTIC BAG, INJECTION (ML) INTRAVENOUS
Status: DISCONTINUED | OUTPATIENT
Start: 2019-01-01 | End: 2019-01-01

## 2019-01-01 RX ORDER — SCOLOPAMINE TRANSDERMAL SYSTEM 1 MG/1
1 PATCH, EXTENDED RELEASE TRANSDERMAL
Status: DISCONTINUED | OUTPATIENT
Start: 2019-01-01 | End: 2019-01-01 | Stop reason: HOSPADM

## 2019-01-01 RX ORDER — DIPHENHYDRAMINE HYDROCHLORIDE 50 MG/ML
12.5 INJECTION, SOLUTION INTRAMUSCULAR; INTRAVENOUS AS NEEDED
Status: DISCONTINUED | OUTPATIENT
Start: 2019-01-01 | End: 2019-01-01 | Stop reason: HOSPADM

## 2019-01-01 RX ORDER — FENTANYL CITRATE 50 UG/ML
INJECTION, SOLUTION INTRAMUSCULAR; INTRAVENOUS AS NEEDED
Status: DISCONTINUED | OUTPATIENT
Start: 2019-01-01 | End: 2019-01-01 | Stop reason: HOSPADM

## 2019-01-01 RX ORDER — NALOXONE HYDROCHLORIDE 0.4 MG/ML
0.4 INJECTION, SOLUTION INTRAMUSCULAR; INTRAVENOUS; SUBCUTANEOUS
Status: DISCONTINUED | OUTPATIENT
Start: 2019-01-01 | End: 2019-01-01 | Stop reason: HOSPADM

## 2019-01-01 RX ORDER — SODIUM CHLORIDE 9 MG/ML
75 INJECTION, SOLUTION INTRAVENOUS CONTINUOUS
Status: DISCONTINUED | OUTPATIENT
Start: 2019-01-01 | End: 2019-01-01 | Stop reason: HOSPADM

## 2019-01-01 RX ORDER — MENTHOL AND ZINC OXIDE .44; 20.625 G/100G; G/100G
1 OINTMENT TOPICAL
Status: DISCONTINUED | OUTPATIENT
Start: 2019-01-01 | End: 2019-01-01 | Stop reason: HOSPADM

## 2019-01-01 RX ORDER — PROPOFOL 10 MG/ML
INJECTION, EMULSION INTRAVENOUS
Status: DISCONTINUED | OUTPATIENT
Start: 2019-01-01 | End: 2019-01-01 | Stop reason: HOSPADM

## 2019-01-01 RX ADMIN — Medication 10 ML: at 21:30

## 2019-01-01 RX ADMIN — Medication 10 ML: at 14:30

## 2019-01-01 RX ADMIN — MORPHINE SULFATE 2 MG: 2 INJECTION, SOLUTION INTRAMUSCULAR; INTRAVENOUS at 10:39

## 2019-01-01 RX ADMIN — HEPARIN SODIUM 5000 UNITS: 5000 INJECTION INTRAVENOUS; SUBCUTANEOUS at 14:29

## 2019-01-01 RX ADMIN — Medication 10 ML: at 22:38

## 2019-01-01 RX ADMIN — HEPARIN SODIUM 5000 UNITS: 5000 INJECTION INTRAVENOUS; SUBCUTANEOUS at 06:00

## 2019-01-01 RX ADMIN — PANTOPRAZOLE SODIUM 40 MG: 40 INJECTION, POWDER, FOR SOLUTION INTRAVENOUS at 08:33

## 2019-01-01 RX ADMIN — OXYBUTYNIN CHLORIDE 5 MG: 5 TABLET ORAL at 22:36

## 2019-01-01 RX ADMIN — HEPARIN SODIUM 5000 UNITS: 5000 INJECTION INTRAVENOUS; SUBCUTANEOUS at 23:48

## 2019-01-01 RX ADMIN — BACLOFEN 2.5 MG: 10 TABLET ORAL at 09:48

## 2019-01-01 RX ADMIN — Medication 10 ML: at 19:47

## 2019-01-01 RX ADMIN — POTASSIUM CHLORIDE 20 MEQ: 400 INJECTION, SOLUTION INTRAVENOUS at 09:52

## 2019-01-01 RX ADMIN — BACLOFEN 2.5 MG: 10 TABLET ORAL at 21:38

## 2019-01-01 RX ADMIN — PROPOFOL 70 MG: 10 INJECTION, EMULSION INTRAVENOUS at 09:00

## 2019-01-01 RX ADMIN — METRONIDAZOLE 500 MG: 500 INJECTION, SOLUTION INTRAVENOUS at 06:57

## 2019-01-01 RX ADMIN — SODIUM CHLORIDE 125 ML/HR: 900 INJECTION, SOLUTION INTRAVENOUS at 15:05

## 2019-01-01 RX ADMIN — Medication: at 08:05

## 2019-01-01 RX ADMIN — SODIUM CHLORIDE 75 ML/HR: 900 INJECTION, SOLUTION INTRAVENOUS at 19:44

## 2019-01-01 RX ADMIN — SODIUM CHLORIDE 500 ML: 900 INJECTION, SOLUTION INTRAVENOUS at 22:35

## 2019-01-01 RX ADMIN — ENOXAPARIN SODIUM 40 MG: 40 INJECTION SUBCUTANEOUS at 20:30

## 2019-01-01 RX ADMIN — Medication 10 ML: at 05:10

## 2019-01-01 RX ADMIN — POTASSIUM CHLORIDE 10 MEQ: 10 INJECTION, SOLUTION INTRAVENOUS at 14:30

## 2019-01-01 RX ADMIN — ACETAMINOPHEN 650 MG: 325 TABLET ORAL at 14:15

## 2019-01-01 RX ADMIN — Medication 10 ML: at 13:44

## 2019-01-01 RX ADMIN — LORAZEPAM 1 MG: 2 INJECTION INTRAMUSCULAR; INTRAVENOUS at 13:37

## 2019-01-01 RX ADMIN — DEXTROSE MONOHYDRATE AND SODIUM CHLORIDE 75 ML/HR: 5; .45 INJECTION, SOLUTION INTRAVENOUS at 08:05

## 2019-01-01 RX ADMIN — SODIUM CHLORIDE 1000 ML: 900 INJECTION, SOLUTION INTRAVENOUS at 18:03

## 2019-01-01 RX ADMIN — OXYBUTYNIN CHLORIDE 5 MG: 5 TABLET ORAL at 23:02

## 2019-01-01 RX ADMIN — Medication 10 ML: at 23:27

## 2019-01-01 RX ADMIN — OXYBUTYNIN CHLORIDE 5 MG: 5 TABLET ORAL at 23:49

## 2019-01-01 RX ADMIN — HEPARIN SODIUM 5000 UNITS: 5000 INJECTION INTRAVENOUS; SUBCUTANEOUS at 23:02

## 2019-01-01 RX ADMIN — PANTOPRAZOLE SODIUM 40 MG: 40 TABLET, DELAYED RELEASE ORAL at 08:11

## 2019-01-01 RX ADMIN — Medication 10 ML: at 06:00

## 2019-01-01 RX ADMIN — PANTOPRAZOLE SODIUM 40 MG: 40 INJECTION, POWDER, FOR SOLUTION INTRAVENOUS at 10:06

## 2019-01-01 RX ADMIN — OXYBUTYNIN CHLORIDE 5 MG: 5 TABLET ORAL at 09:17

## 2019-01-01 RX ADMIN — ACETAMINOPHEN 650 MG: 325 TABLET ORAL at 07:54

## 2019-01-01 RX ADMIN — CEFTRIAXONE 2 G: 2 INJECTION, POWDER, FOR SOLUTION INTRAMUSCULAR; INTRAVENOUS at 13:23

## 2019-01-01 RX ADMIN — PROPOFOL 20 MG: 10 INJECTION, EMULSION INTRAVENOUS at 15:15

## 2019-01-01 RX ADMIN — LIDOCAINE HYDROCHLORIDE 10 ML: 10 INJECTION, SOLUTION INFILTRATION; PERINEURAL at 13:00

## 2019-01-01 RX ADMIN — MENTHOL AND ZINC OXIDE 1 EACH: .44; 20.625 OINTMENT TOPICAL at 09:25

## 2019-01-01 RX ADMIN — PANTOPRAZOLE SODIUM 40 MG: 40 TABLET, DELAYED RELEASE ORAL at 17:06

## 2019-01-01 RX ADMIN — SODIUM CHLORIDE 505 ML: 900 INJECTION, SOLUTION INTRAVENOUS at 18:03

## 2019-01-01 RX ADMIN — Medication 10 ML: at 22:40

## 2019-01-01 RX ADMIN — BACLOFEN 2.5 MG: 10 TABLET ORAL at 10:06

## 2019-01-01 RX ADMIN — Medication: at 08:34

## 2019-01-01 RX ADMIN — BACLOFEN 2.5 MG: 10 TABLET ORAL at 15:51

## 2019-01-01 RX ADMIN — CEFTRIAXONE 2 G: 2 INJECTION, POWDER, FOR SOLUTION INTRAMUSCULAR; INTRAVENOUS at 13:21

## 2019-01-01 RX ADMIN — MORPHINE SULFATE 2 MG: 2 INJECTION, SOLUTION INTRAMUSCULAR; INTRAVENOUS at 09:29

## 2019-01-01 RX ADMIN — SODIUM CHLORIDE, SODIUM LACTATE, POTASSIUM CHLORIDE, CALCIUM CHLORIDE: 600; 310; 30; 20 INJECTION, SOLUTION INTRAVENOUS at 08:10

## 2019-01-01 RX ADMIN — Medication 10 ML: at 11:15

## 2019-01-01 RX ADMIN — OXYBUTYNIN CHLORIDE 5 MG: 5 TABLET ORAL at 22:38

## 2019-01-01 RX ADMIN — POTASSIUM CHLORIDE 20 MEQ: 400 INJECTION, SOLUTION INTRAVENOUS at 10:53

## 2019-01-01 RX ADMIN — BACLOFEN 2.5 MG: 10 TABLET ORAL at 21:59

## 2019-01-01 RX ADMIN — HEPARIN SODIUM 5000 UNITS: 5000 INJECTION INTRAVENOUS; SUBCUTANEOUS at 15:33

## 2019-01-01 RX ADMIN — Medication 10 ML: at 22:09

## 2019-01-01 RX ADMIN — LORAZEPAM 1 MG: 2 INJECTION INTRAMUSCULAR; INTRAVENOUS at 09:31

## 2019-01-01 RX ADMIN — PANTOPRAZOLE SODIUM 40 MG: 40 INJECTION, POWDER, FOR SOLUTION INTRAVENOUS at 10:00

## 2019-01-01 RX ADMIN — OXYBUTYNIN CHLORIDE 5 MG: 5 TABLET ORAL at 15:47

## 2019-01-01 RX ADMIN — VANCOMYCIN HYDROCHLORIDE 1000 MG: 1 INJECTION, POWDER, LYOPHILIZED, FOR SOLUTION INTRAVENOUS at 15:20

## 2019-01-01 RX ADMIN — HEPARIN SODIUM 5000 UNITS: 5000 INJECTION INTRAVENOUS; SUBCUTANEOUS at 15:38

## 2019-01-01 RX ADMIN — CEFTRIAXONE 2 G: 2 INJECTION, POWDER, FOR SOLUTION INTRAMUSCULAR; INTRAVENOUS at 13:13

## 2019-01-01 RX ADMIN — BACLOFEN 2.5 MG: 10 TABLET ORAL at 21:29

## 2019-01-01 RX ADMIN — NOREPINEPHRINE BITARTRATE 4 MCG/MIN: 1 INJECTION INTRAVENOUS at 13:41

## 2019-01-01 RX ADMIN — HEPARIN SODIUM 5000 UNITS: 5000 INJECTION INTRAVENOUS; SUBCUTANEOUS at 15:50

## 2019-01-01 RX ADMIN — Medication 10 ML: at 14:16

## 2019-01-01 RX ADMIN — METRONIDAZOLE 500 MG: 500 INJECTION, SOLUTION INTRAVENOUS at 06:29

## 2019-01-01 RX ADMIN — ZINC OXIDE: 400 OINTMENT TOPICAL at 08:10

## 2019-01-01 RX ADMIN — HEPARIN SODIUM 5000 UNITS: 5000 INJECTION INTRAVENOUS; SUBCUTANEOUS at 22:09

## 2019-01-01 RX ADMIN — BISACODYL 10 MG: 10 SUPPOSITORY RECTAL at 11:45

## 2019-01-01 RX ADMIN — CEFEPIME HYDROCHLORIDE 2 G: 2 INJECTION, POWDER, FOR SOLUTION INTRAVENOUS at 11:44

## 2019-01-01 RX ADMIN — HEPARIN SODIUM 5000 UNITS: 5000 INJECTION INTRAVENOUS; SUBCUTANEOUS at 14:15

## 2019-01-01 RX ADMIN — PANTOPRAZOLE SODIUM 40 MG: 40 INJECTION, POWDER, FOR SOLUTION INTRAVENOUS at 09:08

## 2019-01-01 RX ADMIN — HEPARIN SODIUM 5000 UNITS: 5000 INJECTION INTRAVENOUS; SUBCUTANEOUS at 15:47

## 2019-01-01 RX ADMIN — Medication 10 ML: at 13:23

## 2019-01-01 RX ADMIN — HEPARIN SODIUM 5000 UNITS: 5000 INJECTION INTRAVENOUS; SUBCUTANEOUS at 06:33

## 2019-01-01 RX ADMIN — OXYBUTYNIN CHLORIDE 5 MG: 5 TABLET ORAL at 21:32

## 2019-01-01 RX ADMIN — OXYBUTYNIN CHLORIDE 5 MG: 5 TABLET ORAL at 09:33

## 2019-01-01 RX ADMIN — METRONIDAZOLE 500 MG: 500 INJECTION, SOLUTION INTRAVENOUS at 18:05

## 2019-01-01 RX ADMIN — HEPARIN SODIUM 5000 UNITS: 5000 INJECTION INTRAVENOUS; SUBCUTANEOUS at 15:20

## 2019-01-01 RX ADMIN — VANCOMYCIN HYDROCHLORIDE 1000 MG: 1 INJECTION, POWDER, LYOPHILIZED, FOR SOLUTION INTRAVENOUS at 13:02

## 2019-01-01 RX ADMIN — OXYBUTYNIN CHLORIDE 5 MG: 5 TABLET ORAL at 16:09

## 2019-01-01 RX ADMIN — BISACODYL 10 MG: 10 SUPPOSITORY RECTAL at 13:36

## 2019-01-01 RX ADMIN — PANTOPRAZOLE SODIUM 40 MG: 40 INJECTION, POWDER, FOR SOLUTION INTRAVENOUS at 10:08

## 2019-01-01 RX ADMIN — BACLOFEN 10 MG: 10 TABLET ORAL at 22:48

## 2019-01-01 RX ADMIN — CEFTRIAXONE 2 G: 2 INJECTION, POWDER, FOR SOLUTION INTRAMUSCULAR; INTRAVENOUS at 13:49

## 2019-01-01 RX ADMIN — OXYBUTYNIN CHLORIDE 5 MG: 5 TABLET ORAL at 11:14

## 2019-01-01 RX ADMIN — PANTOPRAZOLE SODIUM 40 MG: 40 INJECTION, POWDER, FOR SOLUTION INTRAVENOUS at 21:59

## 2019-01-01 RX ADMIN — OXYBUTYNIN CHLORIDE 5 MG: 5 TABLET ORAL at 10:10

## 2019-01-01 RX ADMIN — SODIUM CHLORIDE 1000 ML: 900 INJECTION, SOLUTION INTRAVENOUS at 12:14

## 2019-01-01 RX ADMIN — BISACODYL 10 MG: 10 SUPPOSITORY RECTAL at 15:41

## 2019-01-01 RX ADMIN — SODIUM CHLORIDE 1000 ML: 900 INJECTION, SOLUTION INTRAVENOUS at 13:07

## 2019-01-01 RX ADMIN — BACLOFEN 2.5 MG: 10 TABLET ORAL at 17:23

## 2019-01-01 RX ADMIN — VANCOMYCIN HYDROCHLORIDE 1000 MG: 1 INJECTION, POWDER, LYOPHILIZED, FOR SOLUTION INTRAVENOUS at 01:10

## 2019-01-01 RX ADMIN — OXYBUTYNIN CHLORIDE 5 MG: 5 TABLET ORAL at 16:39

## 2019-01-01 RX ADMIN — HEPARIN SODIUM 5000 UNITS: 5000 INJECTION INTRAVENOUS; SUBCUTANEOUS at 07:55

## 2019-01-01 RX ADMIN — BACLOFEN 2.5 MG: 10 TABLET ORAL at 17:06

## 2019-01-01 RX ADMIN — PANTOPRAZOLE SODIUM 40 MG: 40 INJECTION, POWDER, FOR SOLUTION INTRAVENOUS at 21:38

## 2019-01-01 RX ADMIN — HEPARIN SODIUM 5000 UNITS: 5000 INJECTION INTRAVENOUS; SUBCUTANEOUS at 22:39

## 2019-01-01 RX ADMIN — BACLOFEN 2.5 MG: 10 TABLET ORAL at 23:03

## 2019-01-01 RX ADMIN — PANTOPRAZOLE SODIUM 40 MG: 40 INJECTION, POWDER, FOR SOLUTION INTRAVENOUS at 09:20

## 2019-01-01 RX ADMIN — SODIUM CHLORIDE 125 ML/HR: 900 INJECTION, SOLUTION INTRAVENOUS at 13:16

## 2019-01-01 RX ADMIN — Medication 5 ML: at 14:00

## 2019-01-01 RX ADMIN — OXYBUTYNIN CHLORIDE 5 MG: 5 TABLET ORAL at 21:34

## 2019-01-01 RX ADMIN — SODIUM CHLORIDE 125 ML/HR: 900 INJECTION, SOLUTION INTRAVENOUS at 20:50

## 2019-01-01 RX ADMIN — OXYBUTYNIN CHLORIDE 5 MG: 5 TABLET ORAL at 20:51

## 2019-01-01 RX ADMIN — Medication 10 ML: at 05:25

## 2019-01-01 RX ADMIN — BACLOFEN 2.5 MG: 10 TABLET ORAL at 09:17

## 2019-01-01 RX ADMIN — Medication 10 ML: at 14:07

## 2019-01-01 RX ADMIN — Medication 10 ML: at 06:07

## 2019-01-01 RX ADMIN — METRONIDAZOLE 500 MG: 500 INJECTION, SOLUTION INTRAVENOUS at 18:39

## 2019-01-01 RX ADMIN — OXYBUTYNIN CHLORIDE 5 MG: 5 TABLET ORAL at 22:00

## 2019-01-01 RX ADMIN — HEPARIN SODIUM 5000 UNITS: 5000 INJECTION INTRAVENOUS; SUBCUTANEOUS at 22:00

## 2019-01-01 RX ADMIN — PROPOFOL 50 MCG/KG/MIN: 10 INJECTION, EMULSION INTRAVENOUS at 15:10

## 2019-01-01 RX ADMIN — PROPOFOL 20 MG: 10 INJECTION, EMULSION INTRAVENOUS at 15:27

## 2019-01-01 RX ADMIN — OXYBUTYNIN CHLORIDE 5 MG: 5 TABLET ORAL at 10:06

## 2019-01-01 RX ADMIN — OXYBUTYNIN CHLORIDE 5 MG: 5 TABLET ORAL at 17:23

## 2019-01-01 RX ADMIN — CEFTRIAXONE 2 G: 2 INJECTION, POWDER, FOR SOLUTION INTRAMUSCULAR; INTRAVENOUS at 13:04

## 2019-01-01 RX ADMIN — HEPARIN SODIUM 5000 UNITS: 5000 INJECTION INTRAVENOUS; SUBCUTANEOUS at 23:42

## 2019-01-01 RX ADMIN — CEFTRIAXONE 2 G: 2 INJECTION, POWDER, FOR SOLUTION INTRAMUSCULAR; INTRAVENOUS at 14:16

## 2019-01-01 RX ADMIN — OXYBUTYNIN CHLORIDE 5 MG: 5 TABLET ORAL at 16:02

## 2019-01-01 RX ADMIN — BACLOFEN 2.5 MG: 10 TABLET ORAL at 08:03

## 2019-01-01 RX ADMIN — SODIUM CHLORIDE 75 ML/HR: 900 INJECTION, SOLUTION INTRAVENOUS at 10:10

## 2019-01-01 RX ADMIN — OXYBUTYNIN CHLORIDE 5 MG: 5 TABLET ORAL at 09:21

## 2019-01-01 RX ADMIN — BACLOFEN 2.5 MG: 10 TABLET ORAL at 15:45

## 2019-01-01 RX ADMIN — CEFTRIAXONE 2 G: 2 INJECTION, POWDER, FOR SOLUTION INTRAMUSCULAR; INTRAVENOUS at 12:46

## 2019-01-01 RX ADMIN — BACLOFEN 2.5 MG: 10 TABLET ORAL at 10:00

## 2019-01-01 RX ADMIN — OXYBUTYNIN CHLORIDE 5 MG: 5 TABLET ORAL at 15:49

## 2019-01-01 RX ADMIN — POTASSIUM CHLORIDE 10 MEQ: 10 INJECTION, SOLUTION INTRAVENOUS at 17:59

## 2019-01-01 RX ADMIN — HEPARIN SODIUM 5000 UNITS: 5000 INJECTION INTRAVENOUS; SUBCUTANEOUS at 22:40

## 2019-01-01 RX ADMIN — CEFEPIME HYDROCHLORIDE 2 G: 2 INJECTION, POWDER, FOR SOLUTION INTRAVENOUS at 20:56

## 2019-01-01 RX ADMIN — BACLOFEN 2.5 MG: 10 TABLET ORAL at 16:39

## 2019-01-01 RX ADMIN — SODIUM CHLORIDE 1000 ML: 900 INJECTION, SOLUTION INTRAVENOUS at 12:57

## 2019-01-01 RX ADMIN — ZINC OXIDE: 400 OINTMENT TOPICAL at 08:05

## 2019-01-01 RX ADMIN — BACLOFEN 2.5 MG: 10 TABLET ORAL at 22:09

## 2019-01-01 RX ADMIN — HEPARIN SODIUM 5000 UNITS: 5000 INJECTION INTRAVENOUS; SUBCUTANEOUS at 16:12

## 2019-01-01 RX ADMIN — OXYBUTYNIN CHLORIDE 5 MG: 5 TABLET ORAL at 10:07

## 2019-01-01 RX ADMIN — CEFTRIAXONE 2 G: 2 INJECTION, POWDER, FOR SOLUTION INTRAMUSCULAR; INTRAVENOUS at 13:33

## 2019-01-01 RX ADMIN — OXYBUTYNIN CHLORIDE 5 MG: 5 TABLET ORAL at 08:33

## 2019-01-01 RX ADMIN — ZINC OXIDE: 400 OINTMENT TOPICAL at 10:08

## 2019-01-01 RX ADMIN — PROPOFOL 20 MG: 10 INJECTION, EMULSION INTRAVENOUS at 15:18

## 2019-01-01 RX ADMIN — Medication: at 09:58

## 2019-01-01 RX ADMIN — CEFTRIAXONE 2 G: 2 INJECTION, POWDER, FOR SOLUTION INTRAMUSCULAR; INTRAVENOUS at 12:13

## 2019-01-01 RX ADMIN — NOREPINEPHRINE BITARTRATE 11 MCG/MIN: 1 INJECTION INTRAVENOUS at 00:50

## 2019-01-01 RX ADMIN — MORPHINE SULFATE 2 MG: 2 INJECTION, SOLUTION INTRAMUSCULAR; INTRAVENOUS at 12:38

## 2019-01-01 RX ADMIN — CEFTRIAXONE 2 G: 2 INJECTION, POWDER, FOR SOLUTION INTRAMUSCULAR; INTRAVENOUS at 12:15

## 2019-01-01 RX ADMIN — ACETAMINOPHEN 650 MG: 325 TABLET ORAL at 16:02

## 2019-01-01 RX ADMIN — Medication 10 ML: at 06:18

## 2019-01-01 RX ADMIN — PANTOPRAZOLE SODIUM 40 MG: 40 INJECTION, POWDER, FOR SOLUTION INTRAVENOUS at 21:36

## 2019-01-01 RX ADMIN — Medication 10 ML: at 13:51

## 2019-01-01 RX ADMIN — Medication: at 09:52

## 2019-01-01 RX ADMIN — VANCOMYCIN HYDROCHLORIDE 2000 MG: 10 INJECTION, POWDER, LYOPHILIZED, FOR SOLUTION INTRAVENOUS at 18:33

## 2019-01-01 RX ADMIN — OXYBUTYNIN CHLORIDE 5 MG: 5 TABLET ORAL at 15:33

## 2019-01-01 RX ADMIN — BACLOFEN 10 MG: 10 TABLET ORAL at 18:20

## 2019-01-01 RX ADMIN — BACLOFEN 2.5 MG: 10 TABLET ORAL at 22:37

## 2019-01-01 RX ADMIN — OXYBUTYNIN CHLORIDE 5 MG: 5 TABLET ORAL at 19:50

## 2019-01-01 RX ADMIN — SODIUM CHLORIDE 1000 ML: 900 INJECTION, SOLUTION INTRAVENOUS at 19:00

## 2019-01-01 RX ADMIN — POTASSIUM CHLORIDE 20 MEQ: 400 INJECTION, SOLUTION INTRAVENOUS at 10:02

## 2019-01-01 RX ADMIN — POTASSIUM PHOSPHATE, MONOBASIC AND POTASSIUM PHOSPHATE, DIBASIC: 224; 236 INJECTION, SOLUTION, CONCENTRATE INTRAVENOUS at 10:17

## 2019-01-01 RX ADMIN — ZINC OXIDE: 400 OINTMENT TOPICAL at 09:09

## 2019-01-01 RX ADMIN — BACLOFEN 10 MG: 10 TABLET ORAL at 09:05

## 2019-01-01 RX ADMIN — OXYBUTYNIN CHLORIDE 5 MG: 5 TABLET ORAL at 15:38

## 2019-01-01 RX ADMIN — LIDOCAINE HYDROCHLORIDE 50 MG: 20 INJECTION, SOLUTION EPIDURAL; INFILTRATION; INTRACAUDAL; PERINEURAL at 15:10

## 2019-01-01 RX ADMIN — OXYBUTYNIN CHLORIDE 5 MG: 5 TABLET ORAL at 18:20

## 2019-01-01 RX ADMIN — BISACODYL 10 MG: 10 SUPPOSITORY RECTAL at 10:06

## 2019-01-01 RX ADMIN — BACLOFEN 10 MG: 10 TABLET ORAL at 10:10

## 2019-01-01 RX ADMIN — HEPARIN SODIUM 5000 UNITS: 5000 INJECTION INTRAVENOUS; SUBCUTANEOUS at 15:21

## 2019-01-01 RX ADMIN — VANCOMYCIN HYDROCHLORIDE 1000 MG: 1 INJECTION, POWDER, LYOPHILIZED, FOR SOLUTION INTRAVENOUS at 04:47

## 2019-01-01 RX ADMIN — CEFEPIME HYDROCHLORIDE 1 G: 1 INJECTION, POWDER, FOR SOLUTION INTRAMUSCULAR; INTRAVENOUS at 04:02

## 2019-01-01 RX ADMIN — Medication 10 ML: at 07:17

## 2019-01-01 RX ADMIN — MIDAZOLAM HYDROCHLORIDE 2 MG: 1 INJECTION, SOLUTION INTRAMUSCULAR; INTRAVENOUS at 08:53

## 2019-01-01 RX ADMIN — HEPARIN SODIUM 5000 UNITS: 5000 INJECTION INTRAVENOUS; SUBCUTANEOUS at 14:53

## 2019-01-01 RX ADMIN — BACLOFEN 2.5 MG: 10 TABLET ORAL at 15:39

## 2019-01-01 RX ADMIN — NOREPINEPHRINE BITARTRATE 10 MCG/MIN: 1 INJECTION INTRAVENOUS at 21:33

## 2019-01-01 RX ADMIN — HYDROMORPHONE HYDROCHLORIDE 0.5 MG: 1 INJECTION, SOLUTION INTRAMUSCULAR; INTRAVENOUS; SUBCUTANEOUS at 04:34

## 2019-01-01 RX ADMIN — ZINC OXIDE: 400 OINTMENT TOPICAL at 08:34

## 2019-01-01 RX ADMIN — HEPARIN SODIUM 5000 UNITS: 5000 INJECTION INTRAVENOUS; SUBCUTANEOUS at 14:16

## 2019-01-01 RX ADMIN — HEPARIN SODIUM 5000 UNITS: 5000 INJECTION INTRAVENOUS; SUBCUTANEOUS at 06:01

## 2019-01-01 RX ADMIN — Medication 10 ML: at 21:50

## 2019-01-01 RX ADMIN — BACLOFEN 2.5 MG: 10 TABLET ORAL at 09:33

## 2019-01-01 RX ADMIN — ONDANSETRON 4 MG: 2 INJECTION INTRAMUSCULAR; INTRAVENOUS at 09:19

## 2019-01-01 RX ADMIN — PANTOPRAZOLE SODIUM 40 MG: 40 INJECTION, POWDER, FOR SOLUTION INTRAVENOUS at 08:10

## 2019-01-01 RX ADMIN — OXYBUTYNIN CHLORIDE 5 MG: 5 TABLET ORAL at 17:06

## 2019-01-01 RX ADMIN — OXYBUTYNIN CHLORIDE 5 MG: 5 TABLET ORAL at 09:49

## 2019-01-01 RX ADMIN — BACLOFEN 2.5 MG: 10 TABLET ORAL at 10:08

## 2019-01-01 RX ADMIN — Medication 10 ML: at 22:42

## 2019-01-01 RX ADMIN — HEPARIN SODIUM 5000 UNITS: 5000 INJECTION INTRAVENOUS; SUBCUTANEOUS at 06:07

## 2019-01-01 RX ADMIN — HEPARIN SODIUM 5000 UNITS: 5000 INJECTION INTRAVENOUS; SUBCUTANEOUS at 15:41

## 2019-01-01 RX ADMIN — Medication 10 ML: at 07:04

## 2019-01-01 RX ADMIN — OXYBUTYNIN CHLORIDE 5 MG: 5 TABLET ORAL at 08:04

## 2019-01-01 RX ADMIN — SODIUM CHLORIDE 1000 ML: 900 INJECTION, SOLUTION INTRAVENOUS at 16:04

## 2019-01-01 RX ADMIN — ACETAMINOPHEN 650 MG: 325 TABLET ORAL at 05:09

## 2019-01-01 RX ADMIN — FENTANYL CITRATE 50 MCG: 50 INJECTION, SOLUTION INTRAMUSCULAR; INTRAVENOUS at 09:00

## 2019-01-01 RX ADMIN — Medication: at 10:07

## 2019-01-01 RX ADMIN — Medication 10 ML: at 22:01

## 2019-01-01 RX ADMIN — Medication 10 ML: at 15:21

## 2019-01-01 RX ADMIN — Medication 10 ML: at 15:38

## 2019-01-01 RX ADMIN — HEPARIN SODIUM 5000 UNITS: 5000 INJECTION INTRAVENOUS; SUBCUTANEOUS at 08:22

## 2019-01-01 RX ADMIN — Medication 10 ML: at 21:38

## 2019-01-01 RX ADMIN — Medication 10 ML: at 06:29

## 2019-01-01 RX ADMIN — HEPARIN SODIUM 5000 UNITS: 5000 INJECTION INTRAVENOUS; SUBCUTANEOUS at 22:01

## 2019-01-01 RX ADMIN — BISACODYL 10 MG: 10 SUPPOSITORY RECTAL at 10:03

## 2019-01-01 RX ADMIN — Medication 10 ML: at 06:02

## 2019-01-01 RX ADMIN — ZINC OXIDE: 400 OINTMENT TOPICAL at 08:04

## 2019-01-01 RX ADMIN — Medication: at 10:01

## 2019-01-01 RX ADMIN — HEPARIN SODIUM 5000 UNITS: 5000 INJECTION INTRAVENOUS; SUBCUTANEOUS at 07:16

## 2019-01-01 RX ADMIN — PANTOPRAZOLE SODIUM 40 MG: 40 INJECTION, POWDER, FOR SOLUTION INTRAVENOUS at 23:03

## 2019-01-01 RX ADMIN — CEFEPIME HYDROCHLORIDE 1 G: 1 INJECTION, POWDER, FOR SOLUTION INTRAMUSCULAR; INTRAVENOUS at 11:14

## 2019-01-01 RX ADMIN — Medication: at 08:04

## 2019-01-01 RX ADMIN — PIPERACILLIN AND TAZOBACTAM 3.38 G: 3; .375 INJECTION, POWDER, LYOPHILIZED, FOR SOLUTION INTRAVENOUS at 17:43

## 2019-01-01 RX ADMIN — POTASSIUM CHLORIDE 20 MEQ: 400 INJECTION, SOLUTION INTRAVENOUS at 08:05

## 2019-01-01 RX ADMIN — SODIUM CHLORIDE 125 ML/HR: 900 INJECTION, SOLUTION INTRAVENOUS at 09:21

## 2019-01-01 RX ADMIN — CEFTRIAXONE 2 G: 2 INJECTION, POWDER, FOR SOLUTION INTRAMUSCULAR; INTRAVENOUS at 12:17

## 2019-01-01 RX ADMIN — PANTOPRAZOLE SODIUM 40 MG: 40 INJECTION, POWDER, FOR SOLUTION INTRAVENOUS at 09:49

## 2019-01-01 RX ADMIN — PANTOPRAZOLE SODIUM 40 MG: 40 INJECTION, POWDER, FOR SOLUTION INTRAVENOUS at 09:33

## 2019-01-01 RX ADMIN — LIDOCAINE HYDROCHLORIDE 60 MG: 20 INJECTION, SOLUTION EPIDURAL; INFILTRATION; INTRACAUDAL; PERINEURAL at 09:00

## 2019-01-01 RX ADMIN — BACLOFEN 10 MG: 10 TABLET ORAL at 23:26

## 2019-01-01 RX ADMIN — VANCOMYCIN HYDROCHLORIDE 1250 MG: 10 INJECTION, POWDER, LYOPHILIZED, FOR SOLUTION INTRAVENOUS at 15:42

## 2019-01-01 RX ADMIN — OXYBUTYNIN CHLORIDE 5 MG: 5 TABLET ORAL at 09:19

## 2019-01-01 RX ADMIN — ZINC OXIDE: 400 OINTMENT TOPICAL at 09:57

## 2019-01-01 RX ADMIN — OXYBUTYNIN CHLORIDE 5 MG: 5 TABLET ORAL at 16:12

## 2019-01-01 RX ADMIN — HEPARIN SODIUM 5000 UNITS: 5000 INJECTION INTRAVENOUS; SUBCUTANEOUS at 06:08

## 2019-01-01 RX ADMIN — Medication 10 ML: at 06:16

## 2019-01-01 RX ADMIN — ZINC OXIDE: 400 OINTMENT TOPICAL at 10:01

## 2019-01-01 RX ADMIN — Medication 10 ML: at 07:12

## 2019-01-01 RX ADMIN — PANTOPRAZOLE SODIUM 40 MG: 40 INJECTION, POWDER, FOR SOLUTION INTRAVENOUS at 20:50

## 2019-01-01 RX ADMIN — HEPARIN SODIUM 5000 UNITS: 5000 INJECTION INTRAVENOUS; SUBCUTANEOUS at 06:16

## 2019-01-01 RX ADMIN — PANTOPRAZOLE SODIUM 40 MG: 40 INJECTION, POWDER, FOR SOLUTION INTRAVENOUS at 21:29

## 2019-01-01 RX ADMIN — CEFEPIME HYDROCHLORIDE 2 G: 2 INJECTION, POWDER, FOR SOLUTION INTRAVENOUS at 19:42

## 2019-01-01 RX ADMIN — ZINC OXIDE: 400 OINTMENT TOPICAL at 09:35

## 2019-01-01 RX ADMIN — Medication 10 ML: at 23:03

## 2019-01-01 RX ADMIN — PANTOPRAZOLE SODIUM 40 MG: 40 INJECTION, POWDER, FOR SOLUTION INTRAVENOUS at 22:08

## 2019-01-01 RX ADMIN — ZINC OXIDE: 400 OINTMENT TOPICAL at 09:52

## 2019-01-01 RX ADMIN — SODIUM CHLORIDE 75 ML/HR: 900 INJECTION, SOLUTION INTRAVENOUS at 21:18

## 2019-01-01 RX ADMIN — CEFEPIME HYDROCHLORIDE 2 G: 2 INJECTION, POWDER, FOR SOLUTION INTRAVENOUS at 03:43

## 2019-01-01 RX ADMIN — BACLOFEN 10 MG: 10 TABLET ORAL at 08:03

## 2019-01-01 RX ADMIN — BACLOFEN 10 MG: 10 TABLET ORAL at 16:02

## 2019-01-01 RX ADMIN — BACLOFEN 2.5 MG: 10 TABLET ORAL at 15:33

## 2019-01-01 RX ADMIN — Medication: at 09:10

## 2019-01-01 RX ADMIN — PANTOPRAZOLE SODIUM 40 MG: 40 INJECTION, POWDER, FOR SOLUTION INTRAVENOUS at 09:52

## 2019-01-01 RX ADMIN — Medication 10 ML: at 14:54

## 2019-01-01 RX ADMIN — Medication 10 ML: at 13:05

## 2019-01-01 RX ADMIN — POTASSIUM CHLORIDE 10 MEQ: 10 INJECTION, SOLUTION INTRAVENOUS at 17:06

## 2019-01-01 RX ADMIN — BACLOFEN 2.5 MG: 10 TABLET ORAL at 16:09

## 2019-01-01 RX ADMIN — OXYBUTYNIN CHLORIDE 5 MG: 5 TABLET ORAL at 21:29

## 2019-01-01 RX ADMIN — BACLOFEN 2.5 MG: 10 TABLET ORAL at 22:36

## 2019-01-01 RX ADMIN — BACLOFEN 2.5 MG: 10 TABLET ORAL at 20:50

## 2019-01-01 RX ADMIN — CEFEPIME HYDROCHLORIDE 2 G: 2 INJECTION, POWDER, FOR SOLUTION INTRAVENOUS at 04:01

## 2019-01-01 RX ADMIN — BACLOFEN 2.5 MG: 10 TABLET ORAL at 08:04

## 2019-01-01 RX ADMIN — POTASSIUM CHLORIDE 10 MEQ: 10 INJECTION, SOLUTION INTRAVENOUS at 15:57

## 2019-01-01 RX ADMIN — VANCOMYCIN HYDROCHLORIDE 1000 MG: 1 INJECTION, POWDER, LYOPHILIZED, FOR SOLUTION INTRAVENOUS at 11:43

## 2019-01-01 RX ADMIN — BISACODYL 10 MG: 10 SUPPOSITORY RECTAL at 09:05

## 2019-01-01 RX ADMIN — BACLOFEN 2.5 MG: 10 TABLET ORAL at 16:12

## 2019-01-01 RX ADMIN — METRONIDAZOLE 500 MG: 500 INJECTION, SOLUTION INTRAVENOUS at 19:44

## 2019-01-01 RX ADMIN — HYDROMORPHONE HYDROCHLORIDE 0.5 MG: 1 INJECTION, SOLUTION INTRAMUSCULAR; INTRAVENOUS; SUBCUTANEOUS at 00:17

## 2019-01-01 RX ADMIN — ENOXAPARIN SODIUM 40 MG: 40 INJECTION SUBCUTANEOUS at 20:57

## 2019-01-01 RX ADMIN — Medication 10 ML: at 06:57

## 2019-01-01 RX ADMIN — PANTOPRAZOLE SODIUM 40 MG: 40 INJECTION, POWDER, FOR SOLUTION INTRAVENOUS at 08:03

## 2019-01-01 RX ADMIN — VANCOMYCIN HYDROCHLORIDE 1000 MG: 1 INJECTION, POWDER, LYOPHILIZED, FOR SOLUTION INTRAVENOUS at 07:58

## 2019-01-01 RX ADMIN — BACLOFEN 2.5 MG: 10 TABLET ORAL at 15:47

## 2019-01-01 RX ADMIN — PANTOPRAZOLE SODIUM 40 MG: 40 INJECTION, POWDER, FOR SOLUTION INTRAVENOUS at 15:41

## 2019-01-01 RX ADMIN — SODIUM CHLORIDE 75 ML/HR: 900 INJECTION, SOLUTION INTRAVENOUS at 04:54

## 2019-01-01 RX ADMIN — NOREPINEPHRINE BITARTRATE 11 MCG/MIN: 1 INJECTION INTRAVENOUS at 11:23

## 2019-01-01 RX ADMIN — Medication 5 MG: at 09:13

## 2019-01-01 RX ADMIN — HEPARIN SODIUM 5000 UNITS: 5000 INJECTION INTRAVENOUS; SUBCUTANEOUS at 07:01

## 2019-01-01 RX ADMIN — BACLOFEN 2.5 MG: 10 TABLET ORAL at 09:21

## 2019-01-01 RX ADMIN — LORAZEPAM 1 MG: 2 INJECTION INTRAMUSCULAR; INTRAVENOUS at 11:52

## 2019-01-01 RX ADMIN — Medication 10 ML: at 23:52

## 2019-01-01 RX ADMIN — HEPARIN SODIUM 5000 UNITS: 5000 INJECTION INTRAVENOUS; SUBCUTANEOUS at 22:37

## 2019-01-01 RX ADMIN — DEXTROSE MONOHYDRATE AND SODIUM CHLORIDE 75 ML/HR: 5; .45 INJECTION, SOLUTION INTRAVENOUS at 21:36

## 2019-01-01 RX ADMIN — ZINC OXIDE: 400 OINTMENT TOPICAL at 18:31

## 2019-01-01 RX ADMIN — ENOXAPARIN SODIUM 40 MG: 40 INJECTION SUBCUTANEOUS at 21:49

## 2019-01-01 RX ADMIN — HEPARIN SODIUM 5000 UNITS: 5000 INJECTION INTRAVENOUS; SUBCUTANEOUS at 23:00

## 2019-01-01 RX ADMIN — MORPHINE SULFATE 2 MG: 2 INJECTION, SOLUTION INTRAMUSCULAR; INTRAVENOUS at 14:44

## 2019-01-01 RX ADMIN — HYDROMORPHONE HYDROCHLORIDE 0.5 MG: 2 INJECTION, SOLUTION INTRAMUSCULAR; INTRAVENOUS; SUBCUTANEOUS at 16:46

## 2019-01-01 RX ADMIN — CEFTRIAXONE 2 G: 2 INJECTION, POWDER, FOR SOLUTION INTRAMUSCULAR; INTRAVENOUS at 12:42

## 2019-01-01 RX ADMIN — ACETAMINOPHEN 650 MG: 325 TABLET ORAL at 08:03

## 2019-01-01 RX ADMIN — CEFEPIME HYDROCHLORIDE 1 G: 1 INJECTION, POWDER, FOR SOLUTION INTRAMUSCULAR; INTRAVENOUS at 19:44

## 2019-01-01 RX ADMIN — IOPAMIDOL 100 ML: 755 INJECTION, SOLUTION INTRAVENOUS at 16:22

## 2019-01-01 RX ADMIN — OXYBUTYNIN CHLORIDE 5 MG: 5 TABLET ORAL at 22:09

## 2019-01-01 RX ADMIN — Medication 20 ML: at 14:00

## 2019-01-01 RX ADMIN — PANTOPRAZOLE SODIUM 40 MG: 40 INJECTION, POWDER, FOR SOLUTION INTRAVENOUS at 21:32

## 2019-01-01 RX ADMIN — Medication: at 08:10

## 2019-01-01 RX ADMIN — OXYBUTYNIN CHLORIDE 5 MG: 5 TABLET ORAL at 21:38

## 2019-01-01 RX ADMIN — MORPHINE SULFATE 2 MG: 2 INJECTION, SOLUTION INTRAMUSCULAR; INTRAVENOUS at 14:07

## 2019-01-01 RX ADMIN — BACLOFEN 2.5 MG: 10 TABLET ORAL at 23:49

## 2019-01-01 RX ADMIN — Medication 10 ML: at 21:37

## 2019-01-01 RX ADMIN — Medication 10 ML: at 13:49

## 2019-01-01 RX ADMIN — OXYBUTYNIN CHLORIDE 5 MG: 5 TABLET ORAL at 10:00

## 2019-01-01 RX ADMIN — BACLOFEN 2.5 MG: 10 TABLET ORAL at 08:33

## 2019-01-01 RX ADMIN — ZINC OXIDE: 400 OINTMENT TOPICAL at 10:07

## 2019-01-01 RX ADMIN — ONDANSETRON 4 MG: 2 INJECTION INTRAMUSCULAR; INTRAVENOUS at 12:13

## 2019-01-01 RX ADMIN — BACLOFEN 2.5 MG: 10 TABLET ORAL at 21:32

## 2019-01-01 RX ADMIN — OXYBUTYNIN CHLORIDE 5 MG: 5 TABLET ORAL at 08:03

## 2019-01-01 RX ADMIN — METRONIDAZOLE 500 MG: 500 INJECTION, SOLUTION INTRAVENOUS at 08:03

## 2019-01-01 RX ADMIN — OXYBUTYNIN CHLORIDE 5 MG: 5 TABLET ORAL at 09:05

## 2019-01-01 RX ADMIN — OXYBUTYNIN CHLORIDE 5 MG: 5 TABLET ORAL at 22:39

## 2019-01-01 RX ADMIN — Medication: at 09:35

## 2019-01-01 RX ADMIN — BACLOFEN 2.5 MG: 10 TABLET ORAL at 21:35

## 2019-01-01 RX ADMIN — Medication: at 10:08

## 2019-01-01 RX ADMIN — OXYBUTYNIN CHLORIDE 5 MG: 5 TABLET ORAL at 11:44

## 2019-01-01 RX ADMIN — HEPARIN SODIUM 5000 UNITS: 5000 INJECTION INTRAVENOUS; SUBCUTANEOUS at 23:54

## 2019-01-01 RX ADMIN — HYDROMORPHONE HYDROCHLORIDE 1 MG: 1 INJECTION, SOLUTION INTRAMUSCULAR; INTRAVENOUS; SUBCUTANEOUS at 14:38

## 2019-01-01 RX ADMIN — BACLOFEN 2.5 MG: 10 TABLET ORAL at 22:39

## 2019-01-01 RX ADMIN — LORAZEPAM 1 MG: 2 INJECTION INTRAMUSCULAR; INTRAVENOUS at 14:07

## 2019-01-01 RX ADMIN — OXYBUTYNIN CHLORIDE 5 MG: 5 TABLET ORAL at 15:51

## 2019-01-01 RX ADMIN — BACLOFEN 2.5 MG: 10 TABLET ORAL at 09:19

## 2019-01-01 RX ADMIN — KETOROLAC TROMETHAMINE 15 MG: 30 INJECTION, SOLUTION INTRAMUSCULAR at 12:13
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
## 2019-01-31 NOTE — TELEPHONE ENCOUNTER
Spoke with Tatiana Howell confirmed appt for 2/4/19 arrival timeframe between 11am-12:30pm. Advised to have all medications out for review and to have POA, DNR, insurance cards and photo ID available for review. I advised RN will come out 2/1/19 between 3-4 to complete paperwork. Taitana Howell advised there [place] is the only house with a wooden fence on their street and to come to the side door.

## 2019-01-31 NOTE — TELEPHONE ENCOUNTER
Called [phone] number is not in service. Called [phone] left voicemail asking for a return call. Advised we are able to establish the pt under [facility] for a visit on 2/4/19 if they are available.

## 2019-02-04 PROBLEM — Z97.8 FOLEY CATHETER IN PLACE: Status: ACTIVE | Noted: 2019-01-01

## 2019-02-04 PROBLEM — S31.000A SACRAL WOUND: Status: ACTIVE | Noted: 2019-01-01

## 2019-02-04 PROBLEM — N20.0 NEPHROLITHIASIS: Status: ACTIVE | Noted: 2019-01-01

## 2019-02-04 PROBLEM — M24.50 FLEXION CONTRACTURES: Status: ACTIVE | Noted: 2019-01-01

## 2019-02-04 PROBLEM — G82.50 QUADRIPLEGIA (HCC): Status: ACTIVE | Noted: 2019-01-01

## 2019-02-04 PROBLEM — R61 DIAPHORESIS: Status: ACTIVE | Noted: 2019-01-01

## 2019-02-04 PROBLEM — R53.81 DEBILITY: Status: ACTIVE | Noted: 2019-01-01

## 2019-02-04 NOTE — PATIENT INSTRUCTIONS
Dear Hayden Barron , It was a pleasure seeing you at home today with [facility] ([phone]) Your stated health goal: I want him to be as healthy as he can without complications such as skin breakdown; Gabriela Barrera stated he wants to [employer] as much as he can This is what we talked about:  
 
1. Quadriplegia 
-He used to get up a lot more but he is now is in the bed most of the time 
-This is a change for him 
-As a result, you are requiring more help to care for him 
-This has been hard for you 
-You are getting support to cope with these changes 
-You have had your own health problems, especially with your bones / fractures 2. Wound care 
-He sweats a lot and this causes a rash on his back 
-The sweating is likely from his nervous system process and/or medications 
-His back has a fungal infection that is extensive; I am prescribing an oral anti-fungal medication called Fluconazole to help with this; it is needed because the cream will not be enough to get rid of this 
-He also needs to be kept dry 
-He needs cotton sheets and needs a turn/pull pad under him to avoid shearing 
-He should be turned every 2 hours to get air to his back side 
-Smitha Ames from [address] [place] [address] Kamran Mcmullen is currently helping with the sacral wound 3. Caregiving 
-He is on a new insurance and this is something you are learning; Medicare / Medicaid 
-He has [facility] with [facility] 
-[facility] this has been for about a year for twice a week 4. Respiratory status 
-He eats very well and chews regularly foods; he just doesn't eat as many nuts now 
-His breathing is good; he hasn't had pneumonia 
-He does have some secretions but this isn't frequent 
-There are a lot of animals in the house 
-It is important that you do keep the house free of dust / animal dander as much as possible to ensure allergies don't become a problem 5. Constipation / Bowel programs -His bowel program involves suppositories and rectal stimulation  
-He also takes in water soluble fiber 
-His bowel program is twice a week and this is the most uncomfortable day for him 
-It bothers his knee when he gets up to the shower chair and he needs something stronger for pain 
-I will prescribe Tramadol 50mg - 1/2 tablet every 6 hours as needed; but you do not need to give this to him frequently 6. Arias Catheter Care 
-He used to have a [address] for many years 
-He had to move to a arias catheter which he has had for years (15 years) -He has not had a lot of infections; he drinks a lot of water and this has helped 
-He has an appointment with a Urologist about the catheter and stones 
-He should only need to see him once and we can follow up recommendations and needs in future working with him 
-We will get recommendations from him after the visit 7. Pain in his left knee 
-He has arthritis in this knee; this is the only pain he has 
-Voltaren didn't work 
-You don't want to give him the ibuprofen because of the kidneys 
-You tried acetaminophen but this didn't work 
-It hurts worse with the shower chair but not in the wheelchair 
-I recommend Tramadol 25mg every 6 hours as needed for pain 
-This has minimal affect on bowels so he should tolerate this well 8. Occupational Therapy 
-We has contractures especially of the hands 
-There may be braces that he needs; we will ask Occupational Therapy to see him and make recommendations Health Maintenance Due Topic Date Due  
 Hepatitis C Screening  1950  Shingrix Vaccine Age 50> (1 of 2) 10/22/2002  FOBT Q 1 YEAR AGE 50-75  10/22/2002  GLAUCOMA SCREENING Q2Y  10/22/2017  MEDICARE YEARLY EXAM  03/14/2018 Someone from the [facility] Team will see you again in 2 weeks If there are any concerns before that time, such as medication questions, worsening symptoms or a need to see a physician for an urgent or emergent situation; please call [phone]. A physician is also on call after our normal business hours of 8am to 5pm.  
 
In order to serve you better, please allow up to 48 hours for prescription refills to be processed. Certain medications may require more paperwork or a written prescription that you may need to  from the office. We appreciate you letting us know of any refill requests as soon as possible. Sincerely, The [facility] Team 
 
MD Prakash Nelson, NP Dawit Ray, JUSTEN Sams, RN Shar Johnson, RN Catherine Quezada, [facility] Learning About Skin Care for Immobile Adults Learning About Skin Care for Immobile Adults As we get older, our skin gets thinner and drier, so it is easier to damage. The chance of skin damage is higher for people who can't move much, or who spend most of their time in bed or in a wheelchair. The skin can develop rashes and sores, especially pressure injuries (also called pressure sores). These injuries are caused by constant pressure, which can limit the blood supply to the skin. Skin also can be damaged by sweat, feces, or urine, making pressure injuries more likely and harder to heal. 
You can help protect the skin of the person you're caring for by checking it every day and by being careful when cleaning it. How do you watch for problems? Thin skin in older adults 1. As people age, their skin becomes thinner. Rash areas on the upper body 1. Rashes can develop in folds of skin on the torso, and in the creases of the armpits, elbows, and groin. Rash areas on the lower body 1. Rashes can develop in the areas around the knees, ankles, and toes. Rashes in the groin and anal areas 1. Rashes can develop in the groin and anal areas. What can you do to help prevent pressure injuries? You can help prevent pressure injuries by carefully turning the person every 2 hours. It relieves the pressure that can be placed on an area of the body when a person doesn't move for a long time. Let the person's doctor know if you see pressure injures. The doctor or nurse may give you some advice about how to treat minor sores at home. Serious sores need more medical treatment. You may be able to use devices that help prevent pressure injuries. These include special cushions and mattresses. But they don't take the place of turning the person. What can you do to keep skin healthy? Keeping the person's skin clean and moisturized can help keep their skin healthy. · Help them bathe as often as needed to be clean and comfortable. When helping someone bathe: ? Use gentle soap. ? Use warm (not hot) water. ? Wash gently with a washcloth. ? Pat the skin dry rather than rubbing. You also can offer the person a yuval cloth robe. Yuval cloth is a type of fabric often used for towels. It can help gently dry the skin. · Use moisturizing creams or lotions to keep the skin soft. If the skin is very dry, use a protective barrier cream, lotion, or ointment. These include over-the-counter lotions such as CeraVe and TriCeram. Some lotions are available by prescription. · Don't put moisturizers in creases and folds, such as those under the breasts and in the groin or on the stomach. These areas are already moist. More moisture can lead to rashes and infections. · A humidifier may help prevent dry skin. Make sure to clean the humidifier as directed. This can prevent mold, fungus, or bacteria from forming in the machine. · Good nutrition and plenty of fluids can also help the skin stay healthy and heal if it's damaged. Provide a healthy diet, with lots of protein and fruits and vegetables. Offer the person plenty of water. · When washing clothing and sheets, use mild detergents.  Don't use fabric softeners. And try to have the person wear clothing made with soft fabrics, such as cotton (rather than wool). Where can you learn more? Go to http://rob-gill.info/. Enter 468-175-861 in the search box to learn more about \"Learning About Skin Care for Immobile Adults. \" Current as of: April 18, 2018 Content Version: 11.9 © 7837-2881 [employer], [employer]. Care instructions adapted under license by [employer] (which disclaims liability or warranty for this information). If you have questions about a medical condition or this instruction, always ask your healthcare professional. [address] any warranty or liability for your use of this information.
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
## 2019-02-04 NOTE — ACP (ADVANCE CARE PLANNING)
Advance Care Planning (ACP) Provider [facility] Date of ACP Conversation: 02/04/19 Persons included in Conversation:  patient and POA Length of ACP Conversation in minutes:  20 minutes Authorized Decision Maker (if patient is incapable of making informed decisions): This person is:  
Healthcare Agent/Medical Power of  under Advance Directive Primary Decision Maker: Douglas Mendez - Daughter - [phone] For Patients with Decision Making Capacity:  
Values/Goals: Exploration of values, goals, and preferences if recovery is not expected, even with continued medical treatment in the event of:  Imminent death Severe, permanent brain injury Conversation Outcomes / Follow-Up Plan:  
Reviewed existing Advance Directive Entered DNR order (If yes, complete Durable DNR form) Completed new Durable DNR (old form on file with 2 pages); reviewed Advance Directive and verified primary decision maker is PARDEEP Redd.

## 2019-02-05 NOTE — TELEPHONE ENCOUNTER
This nurse is requesting breathable chux pads for patient due to fungal infection, and excessive sweating on his back. Rafy Hylton will request from [employer].
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
## 2019-02-07 NOTE — TELEPHONE ENCOUNTER
Spoke with Esvin Bazan to advise of appt for 2/8/19 arrival timeframe between 1-3pm and she stated the pt has BOWEL program visits on Tuesdays and Fridays and they usually last up until 2:30pm. Esvin Bazan asking if we can come after that time or can we come another day

## 2019-02-08 NOTE — TELEPHONE ENCOUNTER
Surendra Valdez  1950  02/08/19    REASON FOR CALL: Called to reschedule patient appt from today, appt is now 2/20. Patient has bowel prep on Tues and Friday. While talking to Mrs. Anusha Mart she mentioned that patient needs arias catheter changed. Spoke to Isis at [facility] with BS, verbal order given to change arias catheter on 2/12/19  [facility] nurse Shari Gay is seeing patient that day.

## 2019-02-13 NOTE — TELEPHONE ENCOUNTER
Caller stated she talked with CHARO ANDERSON [facility] yesterday regarding the pt's sweating.  Caller wants to know if we are open to prescribing an antiperspirant medication

## 2019-02-27 NOTE — TELEPHONE ENCOUNTER
Spoke with Nirali Lucio advised the provider is not able to come today and pt's next appt date will be 3/08/19. Advised I will call her closer to the appt date to give her the arrival timeframe.  Nirali Lucio understood

## 2019-03-08 NOTE — PROGRESS NOTES
Home Based [address] [facility]   [id number]      Date of Current Visit: 03/08/19     SUMMARY:   For full summary of patient's condition, please see Home Based Primary Care initial visit note on 2/4/2019. This patient's chronic conditions including quadriplegia and bed confinement have resulted in the inability to leave the home to receive primary medical care without a significant taxing effort. Today we are visiting the patient for the following reason AWV and labs. DIAGNOSES & PLAN:       ICD-10-CM ICD-9-CM    1. Quadriplegia ([facility] [address] G82.50 344.00 CBC W/O DIFF      METABOLIC PANEL, COMPREHENSIVE      HEPATITIS C AB      OCCULT BLOOD IMMUNOASSAY,DIAGNOSTIC      COLLECTION VENOUS BLOOD,VENIPUNCTURE   2. Nephrolithiasis N20.0 592.0 CBC W/O DIFF      METABOLIC PANEL, COMPREHENSIVE      HEPATITIS C AB      OCCULT BLOOD IMMUNOASSAY,DIAGNOSTIC   3. Wound of sacral region, subsequent encounter S31.000D V58.89 CBC W/O DIFF     636.3 METABOLIC PANEL, COMPREHENSIVE      HEPATITIS C AB      OCCULT BLOOD IMMUNOASSAY,DIAGNOSTIC   4. Need for hepatitis C screening test Z11.59 V73.89 CBC W/O DIFF      METABOLIC PANEL, COMPREHENSIVE      HEPATITIS C AB      OCCULT BLOOD IMMUNOASSAY,DIAGNOSTIC      COLLECTION VENOUS BLOOD,VENIPUNCTURE   5. Hepatitis C virus infection cured after antiviral drug therapy Z86.19 V12.09 COLLECTION VENOUS BLOOD,VENIPUNCTURE   6.  Screening for alcoholism Z13.39 V79.1 [place] ANNUAL ALCOHOL SCREEN 15 MIN   7. Screening for depression Z13.31 V79.0 DEPRESSION SCREEN ANNUAL       Patient Instructions   Dear Anil Vasquez ,    It was a pleasure seeing you at home today with Home Based Primary Care ([phone])    Someone from the Home Based Primary Care Team will see you again in 6 weeks    Your stated goal:  I want him to be as healthy as he can without complications such as skin breakdown; Samira Simpson stated he wants to [facility] good\" as much as he can    This is what we talked about (your plan):     1. Well visit  - Annual well visit was completed today  - Labs were collected    2. Hepatitis C  - You shared that you have a history of hepatitis C and completed treatment many years ago  - You have not had any smptoms since  - We discussed that your blood work was collected today  - Part of our health screening includes a hepatitis C test  - Now that we know this important part of your health history, the blood work for the hepatitis antibody will be positive     3. Quadriplegia  -He used to get up a lot more but he is now is in the bed most of the time  -This is a change for him  -As a result, you are requiring more help to care for him  -This has been hard for you  -You are getting support to cope with these changes  -You have had your own health problems, especially with your bones / fractures    4. Wound care  -The back and buttock were unable to be visualized   -Please continue to keep the skin and sheets dry  -He needs cotton sheets and needs a turn/pull pad under him to avoid shearing  -He should be turned every 2 hours to get air to his back side  -Home Health is currently helping with the sacral wound    5. Arias Catheter Care  - The arias catheter is changed by home health  -In the past, Evelio Caldera as met with a Urologist about the catheter and stones  -He should only need to see him once and we can follow up recommendations and needs in future working with him    6. Health Maintenance  There are no preventive care reminders to display for this patient. 7. Advance Care Planning   This is what you have shared with us about Advance Care Planning    Primary Decision Maker: Shira Donaldson - Daughter - [phone]    If there are any concerns before that time, such as medication questions, worsening symptoms or a need to see a physician for an urgent or emergent situation; please call [phone].  A physician is also on call after our normal business hours of 8am to 5pm.     In order to serve you better, please allow up to 48 hours for prescription refills to be processed. Certain medications may require more paperwork or a written prescription that you may need to  from the office. We appreciate you letting us know of any refill requests as soon as possible. Sincerely,    Tree Lerma NP and The Home Based Primary Care Team        Medicare Wellness Visit, Male    The best way to live healthy is to have a lifestyle where you eat a well-balanced diet, exercise regularly, limit alcohol use, and quit all forms of tobacco/nicotine, if applicable. Regular preventive services are another way to keep healthy. Preventive services (vaccines, screening tests, monitoring & exams) can help personalize your care plan, which helps you manage your own care. Screening tests can find health problems at the earliest stages, when they are easiest to treat. [address] [facility] follows the current, evidence-based guidelines published by the [facility] Chandra ([facility]) when recommending preventive services for our patients. Because we follow these guidelines, sometimes recommendations change over time as research supports it. (For example, a prostate screening blood test is no longer routinely recommended for men with no symptoms.)  Of course, you and your doctor may decide to screen more often for some diseases, based on your risk and co-morbidities (chronic disease you are already diagnosed with). Preventive services for you include:  - Medicare offers their members a free annual wellness visit, which is time for you and your primary care provider to discuss and plan for your preventive service needs. Take advantage of this benefit every year!  -All adults over age 72 should receive the recommended pneumonia vaccines. Current USPSTF guidelines recommend a series of two vaccines for the best pneumonia protection.   -All adults should have a flu vaccine yearly and an ECG.  All adults age 61 and older should receive a shingles vaccine once in their lifetime.    -All adults age 38-68 who are overweight should have a diabetes screening test once every three years.   -Other screening tests & preventive services for persons with diabetes include: an eye exam to screen for diabetic retinopathy, a kidney function test, a foot exam, and stricter control over your cholesterol.   -Cardiovascular screening for adults with routine risk involves an electrocardiogram (ECG) at intervals determined by the provider.   -Colorectal cancer screening should be done for adults age 54-65 with no increased risk factors for colorectal cancer. There are a number of acceptable methods of screening for this type of cancer. Each test has its own benefits and drawbacks. Discuss with your provider what is most appropriate for you during your annual wellness visit. The different tests include: colonoscopy (considered the best screening method), a fecal occult blood test, a fecal DNA test, and sigmoidoscopy.  -All adults born between [facility] should be screened once for Hepatitis C.  -An Abdominal Aortic Aneurysm (AAA) Screening is recommended for men age 73-68 who has ever smoked in their lifetime. Here is a list of your current Health Maintenance items (your personalized list of preventive services) with a due date: There are no preventive care reminders to display for this patient. GOALS OF CARE / TREATMENT PREFERENCES     This patient's goals of care are:  I want him to be as healthy as he can without complications such as skin breakdown; German Villanueva stated he wants to [employer]" as much as he can    This patient's resuscitation status is:  [] Attempt Resuscitation       [x] Do Not Attempt Resuscitation      Primary Decision Maker: Starr Mejias - Daughter - [phone]  No flowsheet data found.      HISTORY:   HISTORY OBTAINED FROM: patient and wife  CHIEF COMPLAINT:   Chief Complaint   Patient presents with    Annual Wellness Visit    Labs       HPI/SUBJECTIVE:   The patient is verbal but difficult to understand at times. HPI collected from wife. Wife reporting that Taran Ahumada is doing well. Home health assisting with wound care and monthly arias changes. Fungal rash reportedly resolved per wife. Now using OTC Zeasorb to skin folds. Eating and drinking well. No reports of pain. Recent Fall: [x] No / [] Yes (when):    Last bowel movement:  yesterday    REVIEW OF SYSTEMS:     The following systems were [x] reviewed / [] unable to be reviewed  Systems: constitutional, ears/nose/mouth/throat, respiratory, gastrointestinal, genitourinary, musculoskeletal, integumentary, neurologic, psychiatric, endocrine. Positive findings noted below: contracture     VITAL SIGNS, PHYSICAL EXAM & FUNCTIONAL ASSESSMENT     Vital Signs:     Wt Readings from Last 3 Encounters:   02/07/17 152 lb (68.9 kg)     Temp Readings from Last 3 Encounters:   03/08/19 97.4 °F (36.3 °C) (Temporal)   03/08/19 98.6 °F (37 °C) (Temporal)   03/05/19 98.2 °F (36.8 °C) (Temporal)     BP Readings from Last 3 Encounters:   03/08/19 131/85   03/08/19 110/74   03/05/19 118/68     Pulse Readings from Last 3 Encounters:   03/08/19 66   03/08/19 78   03/05/19 66       Physical Exam:  Constitutional: alert, oriented, participates in conversation but difficult to  understand  Eyes: pupils equal, anicteric  ENMT: no nasal discharge, moist mucous membranes  Cardiovascular: regular rhythm, no M/R/G, distal pulses intact  Respiratory: breathing not labored, symmetric, clear  Gastrointestinal: soft non-tender, +bowel sounds, non-distended  Musculoskeletal: hand contractures, arm flexion contractures, knee contractures, foot contractures  Skin: warm, dry,unable to visualize backside or sacral/buttock area  Neurologic: alert, forgetful at times, verbal but difficult to understand, quadriplegic  Psychiatric: full affect, no hallucinations  Other: arias catheter draining clear     Functional Assessment (description):    Palliative Performance Scale:   Timed Up and Go (TUG):   Fall Risk Assessment, last 12 mths 3/8/2019   Able to walk? No        LAB DATA REVIEWED:     No results found for: HBA1C, HGBE8, QZY0QTTB, SFQ0CVFE, HXF2YCGY  No results found for: MCACR, MCA1, MCA2, MCA3, MCAU, MCAU2, MCALPOCT  No results found for: TSH, TSH2, TSH3, TSHP, TSHEXT, TSHEXT  Lab Results   Component Value Date/Time    VITAMIN D, 25-HYDROXY 25.3 (L) 10/23/2018 03:13 PM         Lab Results   Component Value Date/Time    WBC 12.7 (H) 03/08/2019 03:30 PM    HGB 13.4 03/08/2019 03:30 PM    PLATELET 008 85/05/6668 03:30 PM     Lab Results   Component Value Date/Time    Sodium 137 03/08/2019 03:30 PM    Potassium 4.2 03/08/2019 03:30 PM    Chloride 99 03/08/2019 03:30 PM    CO2 23 03/08/2019 03:30 PM    BUN 9 03/08/2019 03:30 PM    Creatinine 0.81 03/08/2019 03:30 PM    Calcium 9.2 03/08/2019 03:30 PM      Lab Results   Component Value Date/Time    AST (SGOT) 14 03/08/2019 03:30 PM    Alk. phosphatase 65 03/08/2019 03:30 PM    Protein, total 7.6 03/08/2019 03:30 PM    Albumin 4.0 03/08/2019 03:30 PM    Globulin 4.9 (H) 05/25/2009 05:43 PM     No results found for: IRON, FE, TIBC, IBCT, PSAT, FERR      MEDICATIONS & PRESCRIPTIONS     No Known Allergies   Current Outpatient Medications   Medication Sig    miconazole (ZEASORB AF) 2 % topical powder Apply  to affected area two (2) times a day.  traMADol (ULTRAM) 50 mg tablet Take 0.5 Tabs by mouth every six (6) hours as needed for Pain. Max Daily Amount: 100 mg.    baclofen (LIORESAL) 10 mg tablet Take 1 Tab by mouth three (3) times daily.  oxybutynin (DITROPAN) 5 mg tablet Take 1 Tab by mouth three (3) times daily.  Menthol-Zinc Oxide (CALMOSEPTINE) 0.44-20.6 % oint Apply 1 Each to affected area daily.  apply to back and buttocks daily and as needed for soiling    citric ac-gluconolact-mag carb (RENACIDIN) 6.602-3.268 gram/100 mL soln 1 Bottle by Irrigation route every Monday and Thursday for 90 days. No current facility-administered medications for this visit. No orders of the defined types were placed in this encounter.          Total time: 60min  Counseling / coordination time: hepatitis C counseling, lab collection, AWV  > 50% counseling / coordination?:

## 2019-03-08 NOTE — PATIENT INSTRUCTIONS
Dear Giulia Griffiths ,    It was a pleasure seeing you at home today with Home Based Primary Care ([phone])    Someone from the [id number] [employer] Team will see you again in 6 weeks    Your stated goal:  I want him to be as healthy as he can without complications such as skin breakdown; Della Francis stated he wants to [facility] as much as he can    This is what we talked about (your plan):     1. Well visit  - Annual well visit was completed today  - Labs were collected    2. Hepatitis C  - You shared that you have a history of hepatitis C and completed treatment many years ago  - You have not had any smptoms since  - We discussed that your blood work was collected today  - Part of our health screening includes a hepatitis C test  - Now that we know this important part of your health history, the blood work for the hepatitis antibody will be positive     3. Quadriplegia  -He used to get up a lot more but he is now is in the bed most of the time  -This is a change for him  -As a result, you are requiring more help to care for him  -This has been hard for you  -You are getting support to cope with these changes  -You have had your own health problems, especially with your bones / fractures    4. Wound care  -The back and buttock were unable to be visualized   -Please continue to keep the skin and sheets dry  -He needs cotton sheets and needs a turn/pull pad under him to avoid shearing  -He should be turned every 2 hours to get air to his back side  -Home Health is currently helping with the sacral wound    5. Arias Catheter Care  - The arias catheter is changed by home health  -In the past, Della Points as met with a Urologist about the catheter and stones  -He should only need to see him once and we can follow up recommendations and needs in future working with him    6. Health Maintenance  There are no preventive care reminders to display for this patient.     7. Advance Care Planning   This is what you have shared with us about Advance Care Planning    Primary Decision Maker: Joya Burnette - [phone]    If there are any concerns before that time, such as medication questions, worsening symptoms or a need to see a physician for an urgent or emergent situation; please call [phone]. A physician is also on call after our normal business hours of 8am to 5pm.     In order to serve you better, please allow up to 48 hours for prescription refills to be processed. Certain medications may require more paperwork or a written prescription that you may need to  from the office. We appreciate you letting us know of any refill requests as soon as possible. Sincerely,    Jena Galindo NP and The Home Based Primary Care Team        Medicare Wellness Visit, Male    The best way to live healthy is to have a lifestyle where you eat a well-balanced diet, exercise regularly, limit alcohol use, and quit all forms of tobacco/nicotine, if applicable. Regular preventive services are another way to keep healthy. Preventive services (vaccines, screening tests, monitoring & exams) can help personalize your care plan, which helps you manage your own care. Screening tests can find health problems at the earliest stages, when they are easiest to treat. [address] [facility] follows the current, evidence-based guidelines published by the [facility]az[facility] (USPSTF) when recommending preventive services for our patients. Because we follow these guidelines, sometimes recommendations change over time as research supports it. (For example, a prostate screening blood test is no longer routinely recommended for men with no symptoms.)  Of course, you and your doctor may decide to screen more often for some diseases, based on your risk and co-morbidities (chronic disease you are already diagnosed with).    Preventive services for you include:  - Medicare offers their members a free annual wellness visit, which is time for you and your primary care provider to discuss and plan for your preventive service needs. Take advantage of this benefit every year!  -All adults over age 72 should receive the recommended pneumonia vaccines. Current USPSTF guidelines recommend a series of two vaccines for the best pneumonia protection.   -All adults should have a flu vaccine yearly and an ECG. All adults age 61 and older should receive a shingles vaccine once in their lifetime.    -All adults age 38-68 who are overweight should have a diabetes screening test once every three years.   -Other screening tests & preventive services for persons with diabetes include: an eye exam to screen for diabetic retinopathy, a kidney function test, a foot exam, and stricter control over your cholesterol.   -Cardiovascular screening for adults with routine risk involves an electrocardiogram (ECG) at intervals determined by the provider.   -Colorectal cancer screening should be done for adults age 54-65 with no increased risk factors for colorectal cancer. There are a number of acceptable methods of screening for this type of cancer. Each test has its own benefits and drawbacks. Discuss with your provider what is most appropriate for you during your annual wellness visit. The different tests include: colonoscopy (considered the best screening method), a fecal occult blood test, a fecal DNA test, and sigmoidoscopy.  -All adults born between [facility] should be screened once for Hepatitis C.  -An Abdominal Aortic Aneurysm (AAA) Screening is recommended for men age 73-68 who has ever smoked in their lifetime. Here is a list of your current Health Maintenance items (your personalized list of preventive services) with a due date: There are no preventive care reminders to display for this patient.

## 2019-03-10 PROBLEM — Z86.19 HEPATITIS C VIRUS INFECTION CURED AFTER ANTIVIRAL DRUG THERAPY: Status: ACTIVE | Noted: 2019-01-01

## 2019-03-10 NOTE — PROGRESS NOTES
Slight increase WBC. Monitor for s/sx of infection. Note Hep C Ab +. Wife and patient disclosed during encounter a hx of hep c and underwent previously treatment.  This is an expected finding

## 2019-03-11 NOTE — TELEPHONE ENCOUNTER
Nelda Feng  1950  03/11/19    Duration of call 1 min 49 sec    Per JUSTEN Flores  Slight increase WBC. Monitor for s/sx of infection. Note Hep C Ab +. Wife and patient disclosed during encounter a hx of hep c and underwent previously treatment. This is an expected finding    Wife reports dark urine in arias.   UA C&S ordered VTO to Lexii Anderson in [facility] to be done tomorrow
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
## 2019-03-14 NOTE — TELEPHONE ENCOUNTER
UA positive for UTI, Tosin Flores called in [address] to pharmacy. Mrs. Rupesh Collins notified

## 2019-04-09 NOTE — PATIENT INSTRUCTIONS
Candidiasis: Care Instructions Your Care Instructions Candidiasis (say \"ylc-fum-GG-uh-rahul\") is a yeast infection. Yeast normally lives in your body. But it can cause problems if your body's defenses don't work as they should. Some medicines can increase your chance of getting a yeast infection. These include antibiotics, steroids, and cancer drugs. And some diseases like AIDS and diabetes can make you more likely to get yeast infections. There are different types of yeast infections. Birder Glimpse is a yeast infection in the mouth. It usually occurs in people with weak immune systems. It causes white patches inside the mouth and throat. Yeast infections of the skin usually occur in skin folds where the skin stays moist. They cause red, oozing patches on your skin. Babies can get these infections under the diaper. People who often wear gloves can get them on their hands. Many women get vaginal yeast infections. They are most common when women take antibiotics. These infections can cause the vagina to itch and burn. They also cause white discharge that looks like cottage cheese. In rare cases, yeast infects the blood. This can cause serious disease. This kind of infection is treated with medicine given through a needle into a vein (IV). After you start treatment, a yeast infection usually goes away quickly. But if your immune system is weak, the infection may come back. Tell your doctor if you get yeast infections often. Follow-up care is a key part of your treatment and safety. Be sure to make and go to all appointments, and call your doctor if you are having problems. It's also a good idea to know your test results and keep a list of the medicines you take. How can you care for yourself at home? · Take your medicines exactly as prescribed. Call your doctor if you think you are having a problem with your medicine. · Use antibiotics only as directed by your doctor. · Eat yogurt with live cultures. It has bacteria called lactobacillus. It may help prevent some types of yeast infections. · Keep your skin clean and dry. Put powder on moist places. · If you are using a cream or suppository to treat a vaginal yeast infection, don't use condoms or a diaphragm. Use a different type of birth control. · Eat a healthy diet and get regular exercise. This will help keep your immune system strong. When should you call for help? Watch closely for changes in your health, and be sure to contact your doctor if: 
  · You do not get better as expected. Where can you learn more? Go to http://rob-gill.info/. Enter N129 in the search box to learn more about \"Candidiasis: Care Instructions. \" Current as of: May 14, 2018 Content Version: 11.9 © 5753-1488 [employer]. Care instructions adapted under license by [employer] (which disclaims liability or warranty for this information). If you have questions about a medical condition or this instruction, always ask your healthcare professional. [address] any warranty or liability for your use of this information.
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
## 2019-05-08 NOTE — TELEPHONE ENCOUNTER
SW called pt's home to confirm today's follow up in-home SW visit. Spouse answered the phone, stated that she is currently on bedrest and under the care of her doctor for asthma. It became difficult for her to speak and then she began coughing loudly, dtr Rayo Salvador got on the phone and informed that mom has been sick for several days, they have a follow up appointment next Monday with her doctor. Rayo Salvador said they are cancelling all medical visits for Mr. Denys Salas this week due to mom being so ill. Rayo Salvador said she will call this SW next Monday after mom's doctor's appointment to reschedule in-home SW visit.

## 2019-05-10 NOTE — TELEPHONE ENCOUNTER
Returned call to daughter about concerns -    Her father is experiencing all along his penis. At this time she is not in the home and has limited information. Placed call to wife in the home, reports hx of kidney stones and bladder stones. He has a \"recognizable sound her makes\" when he has a stones. Call placed to [facility] for nurse visit to trouble shoot arias catheter. Abdominal and renal ultrasound ordered.   Discussed with wife and agreeable to plan

## 2019-05-11 PROBLEM — Z66 DNR (DO NOT RESUSCITATE): Status: ACTIVE | Noted: 2019-01-01

## 2019-05-11 PROBLEM — R31.9 HEMATURIA: Status: ACTIVE | Noted: 2019-01-01

## 2019-05-11 PROBLEM — Z98.2 S/P VP SHUNT: Status: ACTIVE | Noted: 2019-01-01

## 2019-05-11 PROBLEM — A41.9 SEVERE SEPSIS (HCC): Status: ACTIVE | Noted: 2019-01-01

## 2019-05-11 PROBLEM — N39.0 UTI (URINARY TRACT INFECTION): Status: ACTIVE | Noted: 2019-01-01

## 2019-05-11 PROBLEM — N13.30 HYDRONEPHROSIS: Status: ACTIVE | Noted: 2019-01-01

## 2019-05-11 PROBLEM — R65.20 SEVERE SEPSIS (HCC): Status: ACTIVE | Noted: 2019-01-01

## 2019-05-11 PROBLEM — R61 DIAPHORESIS: Status: RESOLVED | Noted: 2019-01-01 | Resolved: 2019-01-01

## 2019-05-11 PROBLEM — R53.81 DEBILITY: Status: RESOLVED | Noted: 2019-01-01 | Resolved: 2019-01-01

## 2019-05-11 PROBLEM — Z97.8 FOLEY CATHETER IN PLACE: Status: RESOLVED | Noted: 2019-01-01 | Resolved: 2019-01-01

## 2019-05-11 NOTE — ED NOTES
TRANSFER - OUT REPORT: 
 
Verbal report given to Meenu on Ana M Sandy  being transferred to [address] [place] [address] for routine progression of care Report consisted of patients Situation, Background, Assessment and  
Recommendations(SBAR). Information from the following report(s) SBAR, ED Summary, [facility] and Recent Results was reviewed with the receiving nurse. Opportunity for questions and clarification was provided.

## 2019-05-11 NOTE — PROGRESS NOTES
Home based primary care on call~    Called by dtr Langston Saint this morning. Our team had been in touch w/ her yest about her father who has quadriplegia having penile pain which is often a sign of kidney stones for him. Ultrasound cannot be done in the home until Monday. HH RN Johanny Rodriguez (sp) came out yest to see him, concerned about fevers, nausea, abdominal pain, and worsening of the color of his feet (has poor circulation but apparently more \"blue\"). We could call [facility] for a UA  and [facility] can do XRs but per primary care yest, also wanted a renal ultrasound if sx worsened and now possibly other w/u that cannot be done in the home. Goals of patient are to stay in the home if possible but also for full restorative measures except to do not attempt resuscitation (DNR on file). For this reason, do recommend seeking tx at ED, and [place] Saint agrees- she had planned on this, but wanted to touch base w/ us first. She will call nonemergency medical services at plan is for [facility]. Please call w/ questions/concerns, [facility] team to touch base again Monday.

## 2019-05-11 NOTE — PROGRESS NOTES
[facility]: MED RECONCILIATION Comments/Recommendations:  
Medication reconciliation completed by daughter who was knowledgeable regarding medications. Patient has not had any medications today. Confirmed NKDA and preferred [facility] as [facility] [address] [place] [address]. Medications added:  
 
none Medications removed: 
 
none Medications adjusted: 
 
Tramadol 75mg (1 and 1/2 tabs) on Tuesday and Friday adjusted from 25mg q6h PRN Information obtained from: daughter, Rx query Allergies: Patient has no known allergies. Prior to Admission Medications:  
 
Medication Documentation Review Audit Reviewed by Gricelda Kimble (Pharmacist) on 05/11/19 at 1551 Medication Sig Documenting Provider Last Dose Status Taking?  
baclofen (LIORESAL) 10 mg tablet TAKE 1 TABLET BY MOUTH THREE TIMES DAILY Rufina Hicks NP 5/10/2019 Active Yes  
ergocalciferol (ERGOCALCIFEROL) 50,000 unit capsule Take 50,000 Units by mouth Every Thursday. Provider, Historical 5/9/2019 Active Yes Menthol-Zinc Oxide (CALMOSEPTINE) 0.44-20.6 % oint Apply 1 Each to affected area daily as needed. apply to back and buttocks daily and as needed for soiling Provider, Historical  Active Yes  
miconazole (ZEASORB AF) 2 % topical powder Apply  to affected area two (2) times daily as needed. Provider, Historical  Active Yes  
oxybutynin (DITROPAN) 5 mg tablet TAKE 1 TABLET BY MOUTH THREE TIMES DAILY Rufina Hicks NP 5/10/2019 Active Yes  
traMADol (ULTRAM) 50 mg tablet Take 75 mg by mouth every Tuesday and Friday. Provider, Historical 5/3/2019 Active Yes Thank you, Dusty Velasquez, PharmD, BCPS   Contact: [phone]

## 2019-05-11 NOTE — ED TRIAGE NOTES
Pt presents via EMS from home, pt is normally cared for by his wife and home health comes 2x a week. Pt's wife recently had asthma attack preventing her from being able to care for him like she normally would. Pt normally has a \"bowel regimen 2x a week\" but has not had any this week. Pt has hx of brain stem injury. Presents with arias in place with leaking around site. Hx of bladder and kidney stones.

## 2019-05-11 NOTE — PROGRESS NOTES
1348-TRANSFER - IN REPORT: 
Verbal report received from NALINI Paige(name) on Kirsten Andrade  being received from ED(unit) for routine progression of care Report consisted of patients Situation, Background, Assessment and  
Recommendations(SBAR). Information from the following report(s) SBAR, Kardex, ED Summary, Intake/Output, MAR, Recent Results and Cardiac Rhythm SR to ST was reviewed with the receiving nurse. Opportunity for questions and clarification was provided. Anticipate patient arrival to ICU 15. ILIA Calvillo RN 
1606-ER MD at bedside at this time to adjust central line. Tech also at bedside for CHASE study. Pt stable on Levophed at 8mcg/min; he is without complaint; admission complaints have resolved. Primary Nurse Danny Cee RN and EDUARD Dan RN performed a dual skin assessment on this patient Impairment noted- see wound doc flow sheet Dennis score is 12 ILIA Calvillo RN 
5778-Bedside and Verbal shift change report given to NICA Edmonds (oncoming nurse) by ILIA Calvillo RN (offgoing nurse). Report included the following information SBAR, Kardex, Procedure Summary, Intake/Output, MAR, Recent Results and Cardiac Rhythm ST.  Bekah GAYLE

## 2019-05-11 NOTE — ED PROVIDER NOTES
79 y.o. male with past medical history significant for bruised brainstem, quadriplegia, frequent UTI, left knee arthritis, and kidney stones who presents from home via EMS with chief complaint of abdominal distention. Pt presents to the ED and reports abdominal distention, left knee pain, penile pain, fatigue, confusion, and nausea. PT currently undergoes bowel regimen 2x a week but has not undergone a full bowel regimen since 5/4/19. Pt also currently has a arias catheter in place that has been draining around insertion site lately. Daughter also reports cloudy urine present in arias urine collection duct earlier today. In addition, daughter reports pt had \"brown bile coming out of his mouth\" around 0500 today 5/11/19. Daughter reports that when pt has had these collection of symptoms in the past he was found to have either kidney or bladder stones. Daughter reports pt's toes are mildly cyanotic at baseline and that the pt was febrile earlier in the week (100.8F - highest reported). EMS reports temp of 97.9F during transit. Pt currently takes tramadol when he is going to undergo a bowel regimen. Pt's primary caregiver is his spouse who has been unable to care for him as she normally would due to a recent asthma attack. There are no other acute medical concerns at this time. Surgical hx - CSF shunt, left orthopedic surgery, bladder stone removal  
Social hx - Tobacco use: former smoker, Alcohol Use: non-smoker PCP: Sunni Matthews NP Note written by Elba Lundberg, as dictated by Marisela Nava MD 11:03 AM. The history is provided by a relative and the EMS personnel. No  was used. Past Medical History:  
Diagnosis Date  Disorder of brainstem Bruised brainstem  Double vision  Frequent UTI  Kidney stones  Quadriplegia ([facility] [address] Past Surgical History:  
Procedure Laterality Date  HX CSF SHUNT  HX CSF SHUNT  HX ORTHOPAEDIC Left  HX OTHER SURGICAL    
 bladder stone No family history on file. Social History Socioeconomic History  Marital status:  Spouse name: Not on file  Number of children: Not on file  Years of education: Not on file  Highest education level: Not on file Occupational History  Not on file Social Needs  Financial resource strain: Not on file  Food insecurity:  
  Worry: Not on file Inability: Not on file  Transportation needs:  
  Medical: Not on file Non-medical: Not on file Tobacco Use  Smoking status: Former Smoker  Smokeless tobacco: Never Used Substance and Sexual Activity  Alcohol use: No  
 Drug use: No  
 Sexual activity: Not Currently Lifestyle  Physical activity:  
  Days per week: Not on file Minutes per session: Not on file  Stress: Not on file Relationships  Social connections:  
  Talks on phone: Not on file Gets together: Not on file Attends [employer] service: Not on file Active member of club or organization: Not on file Attends meetings of clubs or organizations: Not on file Relationship status: Not on file  Intimate partner violence:  
  Fear of current or ex partner: Not on file Emotionally abused: Not on file Physically abused: Not on file Forced sexual activity: Not on file Other Topics Concern  Not on file Social History Narrative  Not on file ALLERGIES: Patient has no known allergies. Review of Systems Constitutional: Positive for fatigue. Negative for fever. HENT: Negative for facial swelling. Eyes: Negative for visual disturbance. Respiratory: Negative for cough. Cardiovascular: Negative for chest pain. Gastrointestinal: Positive for abdominal distention and nausea. Genitourinary: Positive for penile pain. Musculoskeletal: Positive for arthralgias (left knee). Skin: Negative for rash. Neurological: Negative for dizziness. Hematological: Negative for adenopathy. Psychiatric/Behavioral: Positive for confusion. Negative for suicidal ideas. All other systems reviewed and are negative. There were no vitals filed for this visit. Physical Exam  
 
MDM Number of Diagnoses or Management Options Indwelling Arias catheter present:  
Quadriplegia [facility]):  
Sacral wound, initial encounter:  
Severe sepsis [facility]): Urinary retention:  
Urinary tract infection with hematuria, site unspecified:  
Diagnosis management comments: A:  Quadriplegia with indwelling arias. Tachycardic, altered, no UOP today. + fever. Lactate=5.7. WBC=30. Severely septic. P: 
IVF 3000cc NS Ceftriaxone for UTI 
 
 
delays in obtaining labs and getting fluid started 2/2 poor IV access. Central Line needed to be placed. Procedures Procedure Note - Central Venous Access:  
Performed by Dimitrios Preston MD 
 
Obtained informed Consent. Immediately prior to the procedure, the patient was reevaluated and found suitable for the planned procedure and any planned medications. Immediately prior to the procedure a time out was called to verify the correct patient, procedure, equipment, staff, and marking as appropriate. The site was prepped with ChloraPrep. Using Seldinger technique a Triple Lumen CVC was placed in the Right, Internal Jugular Vein via direct cannulation with 1 number of attempts for IV Access. Ultrasound Guidance was utilized. There was good blood return. The following complications were encountered: None. A follow-up chest x-ray was ordered post procedure. The procedure was tolerated well. CONSULT NOTE: 
1:16 PM Vicky Elkins MD spoke with Dr. Regino Longoria, Consult for Hospitalist.  Discussed available diagnostic tests and clinical findings.   Dr. Regino Longoria will admit and see the pt. 
 
1:23 PM 
I have spent **45* minutes of critical care time involved in lab review, consultations with specialist, family decision-making, and documentation. During this entire length of time I was immediately available to the patient and/or family.

## 2019-05-11 NOTE — PROGRESS NOTES
[address] Dosing Services: Antimicrobial Stewardship Progress Note Consult for antibiotic dosing of vancomycin by Dr. Eulalio Mcknight Pharmacist reviewed antibiotic appropriateness for 67.7 year old , male  for indication of UTI, history of enterococcal UTI in 2009. Day of Therapy 1 Quadriplegia Plan: 
Vancomycin therapy: 
Start Vancomycin therapy, with loading dose of 1250 mg. Follow with dosing by levels due to URIEL and CrCl <30mL/min. Dose calculated to approximate a therapeutic trough of 10-15 mcg/mL. Last trough level / Plan for level: Random level in 24-48 hours (not yet ordered). Pharmacy to follow daily and will make changes to dose and/or frequency based on clinical status. Other Antimicrobial 
(not dosed by pharmacist) Ceftriaxone 2g IV every 24 hours Cultures 5/11 Blood: in process 5/11 Urine: in process Serum Creatinine Lab Results Component Value Date/Time Creatinine 2.60 (H) 05/11/2019 11:37 AM  
 
   
Creatinine Clearance Estimated Creatinine Clearance: 28.7 mL/min (A) (based on SCr of 2.6 mg/dL (H)). Temp  
100.4 °F (38 °C) WBC Lab Results Component Value Date/Time WBC 29.3 (H) 05/11/2019 11:37 AM  
 
   
H/H Lab Results Component Value Date/Time HGB 15.1 05/11/2019 11:37 AM  
 
  
 
Platelets Lab Results Component Value Date/Time PLATELET 146 66/90/6127 11:37 AM  
 
  
 
 
Pharmacist: Signed Javier Sloan Contact information: [phone]

## 2019-05-11 NOTE — H&P
[facility] [address], [place] [address] 
[phone] Admission History and Physical 
 
 
NAME:  Himanshu Lane :   1950 MRN:  [id number] PCP:  Varghese Jin NP Date/Time:  2019 Subjective: CHIEF COMPLAINT: Per daughter, Natalie Venegas had fevers\" HISTORY OF PRESENT ILLNESS:    
Mr. Ayanna Abraham is a 79 y.o.  male with h/o quadraplegia and  shunt admitted for severe sepsis. Per family, noted that pt had penile pain as well as leakage around his catheter. Daughter also noted fevers and bloody urine output. Past Medical History:  
Diagnosis Date  Disorder of brainstem Bruised brainstem  Double vision  Frequent UTI  Kidney stones  Quadriplegia ([address] Past Surgical History:  
Procedure Laterality Date  HX CSF SHUNT  HX CSF SHUNT  HX ORTHOPAEDIC Left  HX OTHER SURGICAL    
 bladder stone Social History Tobacco Use  Smoking status: Former Smoker  Smokeless tobacco: Never Used Substance Use Topics  Alcohol use: No  
  
FH: 
HTN No Known Allergies Prior to Admission medications Medication Sig Start Date End Date Taking? Authorizing Provider  
ergocalciferol (ERGOCALCIFEROL) 50,000 unit capsule Take 50,000 Units by mouth Every Thursday. Yes Provider, Historical  
traMADol (ULTRAM) 50 mg tablet Take 75 mg by mouth every Tuesday and Friday. Yes Provider, Historical  
bisacodyl (DULCOLAX) 10 mg suppository Insert 10 mg into rectum two (2) times a week. Tuesday and Friday   Yes Provider, Historical  
baclofen (LIORESAL) 10 mg tablet TAKE 1 TABLET BY MOUTH THREE TIMES DAILY 19  Yes Mary Ortega NP  
oxybutynin (DITROPAN) 5 mg tablet TAKE 1 TABLET BY MOUTH THREE TIMES DAILY 19  Yes Mary Ortega NP  
miconazole (ZEASORB AF) 2 % topical powder Apply  to affected area two (2) times daily as needed.    Yes Provider, Historical  
 Menthol-Zinc Oxide (CALMOSEPTINE) 0.44-20.6 % oint Apply 1 Each to affected area daily as needed. apply to back and buttocks daily and as needed for soiling   Yes Provider, Historical  
 
 
 
Review of Systems: 
(bold if positive, if negative) Gen:  Eyes:  ENT:  CVS:  Pulm:  GI:   
:   
MS:  Skin:  Psych:  Endo:   
Hem:  Renal:   
Neuro:    
 
 
  
Objective: VITALS:   
Vital signs reviewed; most recent are: 
 
Visit Vitals BP (!) 83/55 Pulse (!) 106 Temp 100.4 °F (38 °C) Resp 17 Ht 6' (1.829 m) Wt 72.6 kg (160 lb) SpO2 100% BMI 21.70 kg/m² SpO2 Readings from Last 6 Encounters:  
05/11/19 100% 04/30/19 98% 04/26/19 98% 04/19/19 98% 04/09/19 98% 04/09/19 97% No intake or output data in the 24 hours ending 05/11/19 1431 Exam:  
 
Physical Exam: 
 
Gen:  Well-developed, well-nourished, in no acute distress HEENT:  Pink conjunctivae, PERRL, hearing intact to voice, moist mucous membranes Neck:  Supple, without masses, thyroid non-tender Resp:  No accessory muscle use, clear breath sounds without wheezes rales or rhonchi 
Card: Tachycardic. No murmurs, normal S1, S2 without thrills, bruits or peripheral edema Abd:  Soft, non-tender, non-distended, normoactive bowel sounds are present, no palpable organomegaly Lymph:  No cervical adenopathy Musc:  No cyanosis or clubbing Skin:  No rashes or ulcers, skin turgor is good Neuro: Quadraplegic. Able to mouth words. Able to track and nod to questions Psych: Appears to have good insight despite minimal ability to communicate RLE liu and cool appearing. Able to doppler a dorsalis pedis and posterior tibialis pulse Labs: 
 
Recent Labs 05/11/19 
1137 WBC 29.3* HGB 15.1 HCT 45.9  Recent Labs 05/11/19 
1137 * K 4.9  CO2 17* * BUN 39* CREA 2.60* CA 8.7 ALB 2.8* SGOT 22 ALT 27 No components found for: Enrique Point No results for input(s): PH, PCO2, PO2, HCO3, FIO2 in the last 72 hours. No results for input(s): INR in the last 72 hours. No lab exists for component: INREXT Ab CT =>  
1. Bilateral hydronephrosis and hydroureter and thickened urinary bladder wall 
with small stones in the bladder. 2. No renal or ureteral calculus. Probable small right renal cyst. 
3. Ventriculoperitoneal shunt tube. 4. Interstitial disease and bilateral lower lobe atelectasis. 5. Cholecystolithiasis. 6. Atherosclerotic abdominal aorta. 7. Ankylosing spondylitis. CXR =>  
Tip of the right internal jugular central venous line is in the right atrium. No 
pneumothorax. Gaseous distention of the stomach. Assessment/Plan:   
 Severe sepsis ([address] (5/11/2019) - likely 2/2 UTI. Per family, no prior h/o MDR organisms. Cultures in our system from 5/2009 do show Enteroccocus  
-f/u blood and urine cultures  
-start Ceftriaxone and vanco  
-aggressive volume resuscitation 
-levophed gtt; titrate MAP to >65 Quadriplegia ([address] (2/4/2019) 
-turn team  
 
  Flexion contractures (2/4/2019) 
-continue baclofen at renally adjusted dose Sacral wound (2/4/2019) 
-wound care Nephrolithiasis (2/4/2019) - no stones noted in the ureter. CT notes stones in the bladder  
-monitor Hepatitis C virus infection cured after antiviral drug therapy (3/10/2019) - stable  
-monitor Hydronephrosis (5/11/2019) - ?chronic. No imaging to compare. Did have an [address] though in 2016 did not show hydro 
-urology eval  
-arias in place S/P  shunt (5/11/2019) -followed by [facility] Hematuria - ?2/2 UTI ?2/2 arias trauma ? bladder pathology  
-IV antibiotics as above 
-urology eval  
-may need cysto when more stable  
-may need CBI if clots noted in the arias RLE leg discoloration - able to doppler pulses.  Suspect 2/2 hypoperfusion 2/2 hypotension in the setting of probable underlying PAD  
-check CHASE's  
 
 DNR (do not resuscitate) (5/11/2019). Daughter is POA Surrogate decision maker: Daughter Total time spent with patient: 70 Minutes cc time Care Plan discussed with: Patient and Family Discussed:  Code Status, Care Plan and D/C Planning Prophylaxis:  Heparin Probable Disposition:  Home w/Family 
        
___________________________________________________ Attending Physician: Saji Singh MD

## 2019-05-12 NOTE — PROGRESS NOTES
2000 Bedside shift change report given to Yasmeen Cotter RN (oncoming nurse) by Lisset Benitez RN offgoing nurse). Report included the following information SBAR. Care plan updated. Patient being turned every 2 hours by turn team. Hermila Jean Baptiste entered for turn team to evaluated patients skin. Buttocks not blanching in some places and red. Patients heels are boggy bilaterally. Patients toes are dusky and the right foot has some black sore areas on toes. These findings were noticed by the day shift nurse on admission. Primary Nurse Yg Freed RN and NALINI Bear performed a dual skin assessment on this patient Impairment noted- see wound doc flow sheet Dennis score is 13 
0500 Patient has had a red sacrum and buttocks. Open to air. Patient has a very small stool. Tender to wipe. Patient has a pea sized stage 2 on the right buttocks. Wound care will be notified. Patient had a 101 temp administered tylenol. Will continue to monitor.

## 2019-05-12 NOTE — PROGRESS NOTES
[facility] [address] 
[address] [place] [address] 
[phone] Medical Progress Note NAME: Farhat Bundy :  1950 MRM:  [id number] Date/Time: 2019  8:42 AM 
 
 
  
Subjective: Chief Complaint:  F/u sepsis Pt remains on vasopressors. Lactate is improving. Pt states he is 'ok'. History is limited overall Objective:  
 
 
Vitals:  
 
 
  
Last 24hrs VS reviewed since prior progress note. Most recent are: 
 
Visit Vitals /57 Pulse (!) 122 Temp (!) 101 °F (38.3 °C) Resp 20 Ht 6' (1.829 m) Wt 76.5 kg (168 lb 10.4 oz) SpO2 100% BMI 22.87 kg/m² SpO2 Readings from Last 6 Encounters:  
19 100% 19 98% 19 98% 19 98% 19 98% 19 97% Intake/Output Summary (Last 24 hours) at 2019 1397 Last data filed at 2019 6624 Gross per 24 hour Intake 3580.52 ml Output 2435 ml Net 1145.52 ml Exam:  
 
Physical Exam: 
 
Gen:  Chronically ill appearing, in no acute distress HEENT:  Pink conjunctivae, PERRL, hearing intact to voice, moist mucous membranes Neck:  Supple, without masses, thyroid non-tender Resp:  No accessory muscle use, clear breath sounds without wheezes rales or rhonchi 
Card:  No murmurs, normal S1, S2 without thrills, bruits or peripheral edema Abd:  Soft, non-tender, non-distended, normoactive bowel sounds are present Musc:  No cyanosis or clubbing, contractured Skin:  Sacral wound Neuro:  No focal deficits, contractured, follows some commands appropriately Psych:  Poor insight, oriented to person Medications Reviewed: (see below) Lab Data Reviewed: (see below) 
 
______________________________________________________________________ Medications:  
 
Current Facility-Administered Medications Medication Dose Route Frequency  sodium chloride (NS) flush 5-10 mL  5-10 mL IntraVENous PRN  
  0.9% sodium chloride infusion  125 mL/hr IntraVENous CONTINUOUS  
 sodium chloride (NS) flush 5-40 mL  5-40 mL IntraVENous Q8H  
 sodium chloride (NS) flush 5-40 mL  5-40 mL IntraVENous PRN  
 acetaminophen (TYLENOL) tablet 650 mg  650 mg Oral Q4H PRN  
 naloxone (NARCAN) injection 0.4 mg  0.4 mg IntraVENous PRN  
 ondansetron (ZOFRAN) injection 4 mg  4 mg IntraVENous Q4H PRN  
 cefTRIAXone (ROCEPHIN) 2 g in 0.9% sodium chloride (MBP/ADV) 50 mL  2 g IntraVENous Q24H  
 NOREPINephrine (LEVOPHED) 8 mg in dextrose 5% 250 mL infusion  0-16 mcg/min IntraVENous TITRATE  pantoprazole (PROTONIX) 40 mg in sodium chloride 0.9% 10 mL injection  40 mg IntraVENous Q12H  
 [START ON 5/16/2019] ergocalciferol capsule 50,000 Units  50,000 Units Oral every Thursday  
 menthol-zinc oxide (CALMOSEPTINE) 0.44-20.6 % ointment 1 Each  1 Each Topical DAILY PRN  
 oxybutynin (DITROPAN) tablet 5 mg  5 mg Oral TID  [START ON 5/14/2019] traMADol (ULTRAM) tablet 75 mg  75 mg Oral EVERY TU & FRI  
 baclofen (LIORESAL) tablet 2.5 mg  2.5 mg Oral TID  traMADol (ULTRAM) tablet 75 mg  75 mg Oral Q6H PRN  
 heparin (porcine) injection 5,000 Units  5,000 Units SubCUTAneous Q8H  Vancomycin: pharmacy dosing by levels    Other Rx Dosing/Monitoring Lab Review:  
 
Recent Labs 05/12/19 0310 05/11/19 
1537 05/11/19 
1137 WBC 20.5* 27.4* 29.3* HGB 12.5 13.3 15.1 HCT 37.1 40.7 45.9  250 291 Recent Labs 05/12/19 
0310 05/11/19 
1537 05/11/19 
1137  129* 132* K 3.9 4.8 4.9 * 107 101 CO2 18* 17* 17* * 151* 170* BUN 40* 37* 39* CREA 1.21 1.75* 2.60* CA 7.7* 7.6* 8.7 MG 1.8  --   --   
ALB  --   --  2.8* SGOT  --   --  22 ALT  --   --  27 No components found for: Enrique Point Assessment / Plan:  
 
Severe sepsis / septic shock: 2/2 UTI. Lactate improving. Urine cultures pending. Will continue with CTX and vanc.  Continue vasopressors to maintain MAP >65 
 Bacteremia: blood cultures flagged positive. Continue abx as above and await speciation Hydronephrosis / Bladder stone / hematuria: renal function is improving with tx of severe sepsis. Urology consult 
  
  Quadriplegia / Flexion contractures: continue baclofen. turn team  
  
  Sacral wound (2/4/2019) 
-wound care  
  
  Hepatitis C virus infection cured after antiviral drug therapy (3/10/2019) - stable S/P  shunt (5/11/2019) -followed by [facility]  
 
  
RLE leg discoloration: ABIs overall normal with some abnormalities noted likely due to sepsis and vasopressors Total time spent with patient: 40 Minutes Critical care Care Plan discussed with: Patient Discussed:  Care Plan Prophylaxis:  Hep SQ Disposition:   PT, OT, RN 
        
___________________________________________________ Attending Physician: Milla Medrano MD

## 2019-05-12 NOTE — CONSULTS
Consult Date: 5/12/2019 IP CONSULT TO UROLOGY Consult performed by: Jerilyn Hussein MD 
Consult ordered by: Faraz Ortiz MD 
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 Gets occasional UTIs. He came in to [facility] this week for a high fever and a UTI. CT shows chronic bialteral hydro, arias in good postion, and some tiny calcifications at the base of the bladder. Improving with supportive care. Arias has been changed. Rec Recurrent UTI not unusual in this setting Might benefit from Hiprex and Vit C fro prevention after rx with abx . Might be good idea to get those little stones out. I don't think they are likely source of infection. All the same, it may be worthwhile to take care of them once he's stable. Will follow. Subjective Past Medical History:  
Diagnosis Date  Disorder of brainstem Bruised brainstem  Double vision  Frequent UTI  Kidney stones  Quadriplegia ([address] Past Surgical History:  
Procedure Laterality Date  HX CSF SHUNT  HX CSF SHUNT  HX ORTHOPAEDIC Left  HX OTHER SURGICAL    
 bladder stone History reviewed. No pertinent family history. Social History Tobacco Use  Smoking status: Former Smoker  Smokeless tobacco: Never Used Substance Use Topics  Alcohol use: No  
   
Current Facility-Administered Medications Medication Dose Route Frequency Provider Last Rate Last Dose  bisacodyl (DULCOLAX) suppository 10 mg  10 mg Rectal DAILY PRN Jose Sparks MD   10 mg at 05/12/19 1336  cefTRIAXone (ROCEPHIN) 2 g in 0.9% sodium chloride (MBP/ADV) 50 mL  2 g IntraVENous Q24H Karla Cho  mL/hr at 05/12/19 1321 2 g at 05/12/19 1321  
 pantoprazole (PROTONIX) tablet 40 mg  40 mg Oral ACB&D Jose Sparks MD      
  vancomycin (VANCOCIN) 1,000 mg in 0.9% sodium chloride (MBP/ADV) 250 mL  1,000 mg IntraVENous Q18H Karla Cho  mL/hr at 05/12/19 1520 1,000 mg at 05/12/19 1520  
 sodium chloride (NS) flush 5-10 mL  5-10 mL IntraVENous PRN Lora Mcneil MD      
 0.9% sodium chloride infusion  125 mL/hr IntraVENous CONTINUOUS Beatriz Boggs  mL/hr at 05/12/19 0921 125 mL/hr at 05/12/19 0921  
 sodium chloride (NS) flush 5-40 mL  5-40 mL IntraVENous Q8H Lora Mcneil MD   10 mL at 05/11/19 1344  sodium chloride (NS) flush 5-40 mL  5-40 mL IntraVENous PRN Lora Mcneil MD      
 acetaminophen (TYLENOL) tablet 650 mg  650 mg Oral Q4H PRN Lora Mcneil MD      
 [facility]) injection 0.4 mg  0.4 mg IntraVENous PRN Lora Mcneil MD      
 ondansetron [facility]) injection 4 mg  4 mg IntraVENous Q4H PRN Lora Mcneil MD      
 NOREPINephrine (LEVOPHED) 8 mg in dextrose 5% 250 mL infusion  0-16 mcg/min IntraVENous TITRATE Lora Mcneil MD 20.6 mL/hr at 05/12/19 1123 11 mcg/min at 05/12/19 1123  
 [START ON 5/16/2019] ergocalciferol capsule 50,000 Units  50,000 Units Oral every Thursday Beatriz Boggs MD      
 menthol-zinc oxide (CALMOSEPTINE) 0.44-20.6 % ointment 1 Each  1 Each Topical DAILY PRN Lora Mcneil MD      
 oxybutynin [facility]) tablet 5 mg  5 mg Oral TID Beatriz Boggs MD   5 mg at 05/12/19 0919  
 [START ON 5/14/2019] traMADol (ULTRAM) tablet 75 mg  75 mg Oral EVERY TU & FRI Lora Mcneil MD      
 baclofen (LIORESAL) tablet 2.5 mg  2.5 mg Oral TID Beatriz Boggs MD   2.5 mg at 05/12/19 7347  traMADol (ULTRAM) tablet 75 mg  75 mg Oral Q6H PRN Lora Mcneil MD      
 heparin (porcine) injection 5,000 Units  5,000 Units SubCUTAneous Q8H Lora Mcneil MD   5,000 Units at 05/12/19 1520 Review of Systems Unable to perform ROS: Mental status change Objective Vital signs for last 24 hours: 
Visit Vitals /67 Pulse (!) 113 Temp 99 °F (37.2 °C) Resp 13 Ht 6' (1.829 m) Wt 76.5 kg (168 lb 10.4 oz) SpO2 99% BMI 22.87 kg/m² Intake/Output this shift: 
Current Shift: 05/12 0701 - 05/12 1900 In: 836.8 [P.O.:400; I.V.:436.8] Out: 500 [Urine:500] Last 3 Shifts: 05/10 1901 - 05/12 0700 In: 3580.5 [P.O.:1080; I.V.:2500.5] Out: 2409 [Urine:2435] Data Review:  
Recent Results (from the past 24 hour(s)) LACTIC ACID Collection Time: 05/11/19  3:37 PM  
Result Value Ref Range Lactic acid 2.9 (HH) 0.4 - 2.0 MMOL/L  
CBC WITH AUTOMATED DIFF Collection Time: 05/11/19  3:37 PM  
Result Value Ref Range WBC 27.4 (H) 4.1 - 11.1 K/uL  
 RBC 4.45 4.10 - 5.70 M/uL  
 HGB 13.3 12.1 - 17.0 g/dL HCT 40.7 36.6 - 50.3 % MCV 91.5 80.0 - 99.0 FL  
 MCH 29.9 26.0 - 34.0 PG  
 MCHC 32.7 30.0 - 36.5 g/dL  
 RDW 13.5 11.5 - 14.5 % PLATELET 453 346 - 278 K/uL MPV 10.0 8.9 - 12.9 FL  
 NRBC 0.0 0  WBC ABSOLUTE NRBC 0.00 0.00 - 0.01 K/uL NEUTROPHILS 93 (H) 32 - 75 % LYMPHOCYTES 4 (L) 12 - 49 % MONOCYTES 3 (L) 5 - 13 % EOSINOPHILS 0 0 - 7 % BASOPHILS 0 0 - 1 % IMMATURE GRANULOCYTES 0 0.0 - 0.5 % ABS. NEUTROPHILS 25.5 (H) 1.8 - 8.0 K/UL  
 ABS. LYMPHOCYTES 1.1 0.8 - 3.5 K/UL  
 ABS. MONOCYTES 0.8 0.0 - 1.0 K/UL  
 ABS. EOSINOPHILS 0.0 0.0 - 0.4 K/UL  
 ABS. BASOPHILS 0.0 0.0 - 0.1 K/UL  
 ABS. IMM. GRANS. 0.0 0.00 - 0.04 K/UL  
 DF AUTOMATED    
 RBC COMMENTS NORMOCYTIC, NORMOCHROMIC METABOLIC PANEL, BASIC Collection Time: 05/11/19  3:37 PM  
Result Value Ref Range Sodium 129 (L) 136 - 145 mmol/L Potassium 4.8 3.5 - 5.1 mmol/L Chloride 107 97 - 108 mmol/L  
 CO2 17 (L) 21 - 32 mmol/L Anion gap 5 5 - 15 mmol/L Glucose 151 (H) 65 - 100 mg/dL BUN 37 (H) 6 - 20 MG/DL Creatinine 1.75 (H) 0.70 - 1.30 MG/DL  
 BUN/Creatinine ratio 21 (H) 12 - 20 GFR est AA 48 (L) >60 ml/min/1.73m2 GFR est non-AA 39 (L) >60 ml/min/1.73m2 Calcium 7.6 (L) 8.5 - 10.1 MG/DL ANKLE BRACHIAL INDEX Collection Time: 05/11/19  4:52 PM  
Result Value Ref Range Right arm  mmHg Left posterior tibial 86 mmHg Right posterior tibial 100 mmHg Left anterior tibial 126 mmHg Right anterior tibial 125 mmHg Left CHASE 1.19 Right CHASE 1.18 Left toe pressure 63 mmHg Right toe pressure 69 mmHg Left TBI 0.59 Right TBI 0.65 LACTIC ACID Collection Time: 05/11/19  7:24 PM  
Result Value Ref Range Lactic acid 2.4 (HH) 0.4 - 2.0 MMOL/L  
CBC WITH AUTOMATED DIFF Collection Time: 05/12/19  3:10 AM  
Result Value Ref Range WBC 20.5 (H) 4.1 - 11.1 K/uL  
 RBC 4.07 (L) 4.10 - 5.70 M/uL  
 HGB 12.5 12.1 - 17.0 g/dL HCT 37.1 36.6 - 50.3 % MCV 91.2 80.0 - 99.0 FL  
 MCH 30.7 26.0 - 34.0 PG  
 MCHC 33.7 30.0 - 36.5 g/dL  
 RDW 14.0 11.5 - 14.5 % PLATELET 391 637 - 809 K/uL MPV 10.2 8.9 - 12.9 FL  
 NRBC 0.0 0  WBC ABSOLUTE NRBC 0.00 0.00 - 0.01 K/uL NEUTROPHILS 89 (H) 32 - 75 % LYMPHOCYTES 6 (L) 12 - 49 % MONOCYTES 5 5 - 13 % EOSINOPHILS 0 0 - 7 % BASOPHILS 0 0 - 1 % IMMATURE GRANULOCYTES 0 0.0 - 0.5 % ABS. NEUTROPHILS 18.3 (H) 1.8 - 8.0 K/UL  
 ABS. LYMPHOCYTES 1.2 0.8 - 3.5 K/UL  
 ABS. MONOCYTES 0.9 0.0 - 1.0 K/UL  
 ABS. EOSINOPHILS 0.0 0.0 - 0.4 K/UL  
 ABS. BASOPHILS 0.0 0.0 - 0.1 K/UL  
 ABS. IMM. GRANS. 0.1 (H) 0.00 - 0.04 K/UL  
 DF AUTOMATED METABOLIC PANEL, BASIC Collection Time: 05/12/19  3:10 AM  
Result Value Ref Range Sodium 137 136 - 145 mmol/L Potassium 3.9 3.5 - 5.1 mmol/L Chloride 110 (H) 97 - 108 mmol/L  
 CO2 18 (L) 21 - 32 mmol/L Anion gap 9 5 - 15 mmol/L Glucose 116 (H) 65 - 100 mg/dL BUN 40 (H) 6 - 20 MG/DL Creatinine 1.21 0.70 - 1.30 MG/DL  
 BUN/Creatinine ratio 33 (H) 12 - 20 GFR est AA >60 >60 ml/min/1.73m2 GFR est non-AA >60 >60 ml/min/1.73m2  Calcium 7.7 (L) 8.5 - 10.1 MG/DL  
 MAGNESIUM Collection Time: 05/12/19  3:10 AM  
Result Value Ref Range Magnesium 1.8 1.6 - 2.4 mg/dL LACTIC ACID Collection Time: 05/12/19  3:19 AM  
Result Value Ref Range Lactic acid 1.7 0.4 - 2.0 MMOL/L Physical Exam  
Constitutional: No distress. HENT:  
Head: Atraumatic. Right Ear: External ear normal.  
Left Ear: External ear normal.  
Eyes: Conjunctivae and EOM are normal.  
Neck: Normal range of motion. Neck supple. Cardiovascular: Normal rate and regular rhythm. Pulmonary/Chest: Effort normal. No respiratory distress. Abdominal: Soft. Bowel sounds are normal.  
Genitourinary: Penis normal. No penile tenderness. Musculoskeletal: Normal range of motion. He exhibits no edema. Neurological: He is alert. Coordination abnormal.  
Skin: No rash noted. He is not diaphoretic. No erythema. Psychiatric: He has a normal mood and affect.

## 2019-05-12 NOTE — CONSULTS
[facility], Critical Care, and Sleep Medicine Name: Brodie Mason MRN: [id number] : 1950 Hospital: [facility] Date: 2019 Critical Care Initial Patient Consult IMPRESSION:  
· Septic shock · Pyelonephritis/ hydronephrosis/ hydroureter · Hypotension:  Remains on levophed · Quadraplegia RECOMMENDATIONS:  
· Follow cultures · IVF · Wean levophed as tolerated · Urology to see · On ceftriaxone and vanco 
· Sq heparin · Ok to take po/ passed bedside swallow eval  
 
Subjective/History: This patient has been seen and evaluated at the request of Dr. Austin Archibald for septic shock. Patient is a 79 y.o. 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 Pt with hydronephrosis/hydroureter noted on abd CT and U/A with pyuria. Pt alert and will nod and shake head to simple questions. Attempts to speak at times but unintelligible. Denies pain, shortness of breath. Lactate elevated on presentation; now normal.  Remains on levophed. Past Medical History:  
Diagnosis Date  Disorder of brainstem Bruised brainstem  Double vision  Frequent UTI  Kidney stones  Quadriplegia ([address] Past Surgical History:  
Procedure Laterality Date  HX CSF SHUNT  HX CSF SHUNT  HX ORTHOPAEDIC Left  HX OTHER SURGICAL    
 bladder stone Prior to Admission medications Medication Sig Start Date End Date Taking? Authorizing Provider  
ergocalciferol (ERGOCALCIFEROL) 50,000 unit capsule Take 50,000 Units by mouth Every Thursday. Yes Provider, Historical  
traMADol (ULTRAM) 50 mg tablet Take 75 mg by mouth every Tuesday and Friday. Yes Provider, Historical  
bisacodyl (DULCOLAX) 10 mg suppository Insert 10 mg into rectum two (2) times a week.  Tuesday and Friday   Yes Provider, Historical  
baclofen (LIORESAL) 10 mg tablet TAKE 1 TABLET BY MOUTH THREE TIMES DAILY 4/13/19  Yes Valeria Ortega, NP  
oxybutynin (DITROPAN) 5 mg tablet TAKE 1 TABLET BY MOUTH THREE TIMES DAILY 4/13/19  Yes Valeria Ortega NP  
miconazole (ZEASORB AF) 2 % topical powder Apply  to affected area two (2) times daily as needed. Yes Provider, Historical  
Menthol-Zinc Oxide (CALMOSEPTINE) 0.44-20.6 % oint Apply 1 Each to affected area daily as needed. apply to back and buttocks daily and as needed for soiling   Yes Provider, Historical  
 
Current Facility-Administered Medications Medication Dose Route Frequency  0.9% sodium chloride infusion  125 mL/hr IntraVENous CONTINUOUS  
 sodium chloride (NS) flush 5-40 mL  5-40 mL IntraVENous Q8H  
 cefTRIAXone (ROCEPHIN) 2 g in 0.9% sodium chloride (MBP/ADV) 50 mL  2 g IntraVENous Q24H  
 NOREPINephrine (LEVOPHED) 8 mg in dextrose 5% 250 mL infusion  0-16 mcg/min IntraVENous TITRATE  pantoprazole (PROTONIX) 40 mg in sodium chloride 0.9% 10 mL injection  40 mg IntraVENous Q12H  
 [START ON 5/16/2019] ergocalciferol capsule 50,000 Units  50,000 Units Oral every Thursday  
 oxybutynin (DITROPAN) tablet 5 mg  5 mg Oral TID  [START ON 5/14/2019] traMADol (ULTRAM) tablet 75 mg  75 mg Oral EVERY TU & FRI  
 baclofen (LIORESAL) tablet 2.5 mg  2.5 mg Oral TID  heparin (porcine) injection 5,000 Units  5,000 Units SubCUTAneous Q8H  Vancomycin: pharmacy dosing by levels    Other Rx Dosing/Monitoring No Known Allergies Social History Tobacco Use  Smoking status: Former Smoker  Smokeless tobacco: Never Used Substance Use Topics  Alcohol use: No  
  
History reviewed. No pertinent family history. Review of Systems: 
Review of systems not obtained due to patient factors. Objective: 
Vital Signs:   
Visit Vitals /56 Pulse (!) 119 Temp 99.6 °F (37.6 °C) Resp 20 Ht 6' (1.829 m) Wt 76.5 kg (168 lb 10.4 oz) SpO2 100% BMI 22.87 kg/m² O2 Device: Room air Temp (24hrs), Av.1 °F (37.3 °C), Min:97.1 °F (36.2 °C), Max:101 °F (38.3 °C) Intake/Output:  
Last shift:      No intake/output data recorded. Last 3 shifts: 05/10 1901 -  0700 In: 3580.5 [P.O.:1080; I.V.:2500.5] Out: 8126 [Urine:2435] Intake/Output Summary (Last 24 hours) at 2019 1337 Last data filed at 2019 1967 Gross per 24 hour Intake 3580.52 ml Output 2435 ml Net 1145.52 ml Hemodynamics:  
PAP:   CO:    
Wedge:   CI:    
CVP:    SVR:    
  PVR:    
 
Ventilator Settings: 
Mode Rate Tidal Volume Pressure FiO2 PEEP Peak airway pressure:     
Minute ventilation:     
 
Physical Exam: 
 
General:  Alert, cooperative, chronically ill Head:  Normocephalic, without obvious abnormality, atraumatic. Eyes:  Conjunctivae/corneas clear. PERRL, EOMs intact. Nose: Nares normal.   
Throat: Lips, mucosa, and tongue normal. Teeth and gums normal.  
Neck: Supple, symmetrical.  Old trach scar Back:   Symmetric, no curvature. ROM normal.  
Lungs:   Clear to auscultation bilaterally. Fair insp effort Chest wall:  No tenderness or deformity. Heart:  Regular rate and rhythm, S1, S2 normal, no murmur, click, rub or gallop. Abdomen:   Soft, non-tender. Protuberant. Flank edema Extremities: Muscle wasting, edema Pulses: 2+ and symmetric all extremities. Skin: Skin color, texture, turgor normal. No rashes or lesions Lymph nodes: Cervical, supraclavicular, and axillary nodes normal.  
Neurologic: quadraplegia Data:  
 
Recent Results (from the past 24 hour(s)) CBC W/O DIFF Collection Time: 19 11:37 AM  
Result Value Ref Range WBC 29.3 (H) 4.1 - 11.1 K/uL  
 RBC 5.00 4. 10 - 5.70 M/uL  
 HGB 15.1 12.1 - 17.0 g/dL HCT 45.9 36.6 - 50.3 % MCV 91.8 80.0 - 99.0 FL  
 MCH 30.2 26.0 - 34.0 PG  
 MCHC 32.9 30.0 - 36.5 g/dL  
 RDW 13.7 11.5 - 14.5 % PLATELET 865 476 - 705 K/uL MPV 9.9 8.9 - 12.9 FL  
 NRBC 0.0 0  WBC ABSOLUTE NRBC 0.00 0.00 - 0.01 K/uL METABOLIC PANEL, COMPREHENSIVE Collection Time: 05/11/19 11:37 AM  
Result Value Ref Range Sodium 132 (L) 136 - 145 mmol/L Potassium 4.9 3.5 - 5.1 mmol/L Chloride 101 97 - 108 mmol/L  
 CO2 17 (L) 21 - 32 mmol/L Anion gap 14 5 - 15 mmol/L Glucose 170 (H) 65 - 100 mg/dL BUN 39 (H) 6 - 20 MG/DL Creatinine 2.60 (H) 0.70 - 1.30 MG/DL  
 BUN/Creatinine ratio 15 12 - 20 GFR est AA 30 (L) >60 ml/min/1.73m2 GFR est non-AA 25 (L) >60 ml/min/1.73m2 Calcium 8.7 8.5 - 10.1 MG/DL Bilirubin, total 1.1 (H) 0.2 - 1.0 MG/DL  
 ALT (SGPT) 27 12 - 78 U/L  
 AST (SGOT) 22 15 - 37 U/L Alk. phosphatase 65 45 - 117 U/L Protein, total 7.0 6.4 - 8.2 g/dL Albumin 2.8 (L) 3.5 - 5.0 g/dL Globulin 4.2 (H) 2.0 - 4.0 g/dL A-G Ratio 0.7 (L) 1.1 - 2.2 SAMPLES BEING HELD Collection Time: 05/11/19 11:37 AM  
Result Value Ref Range SAMPLES BEING HELD 2SST,VIVEK,RED COMMENT Add-on orders for these samples will be processed based on acceptable specimen integrity and analyte stability, which may vary by analyte. URINALYSIS W/MICROSCOPIC Collection Time: 05/11/19 11:37 AM  
Result Value Ref Range Color YELLOW Appearance TURBID (A) CLEAR Specific gravity 1.009 1.003 - 1.030    
 pH (UA) 8.5 (H) 5.0 - 8.0 Protein 100 (A) NEG mg/dL Glucose NEGATIVE  NEG mg/dL Ketone NEGATIVE  NEG mg/dL Bilirubin NEGATIVE  NEG Blood LARGE (A) NEG Urobilinogen 1.0 0.2 - 1.0 EU/dL Nitrites NEGATIVE  NEG Leukocyte Esterase LARGE (A) NEG    
 WBC >100 (H) 0 - 4 /hpf  
 RBC >100 (H) 0 - 5 /hpf Epithelial cells FEW FEW /lpf Bacteria 2+ (A) NEG /hpf Hyaline cast 2-5 0 - 5 /lpf URINE CULTURE HOLD SAMPLE Collection Time: 05/11/19 11:37 AM  
Result Value Ref Range Urine culture hold URINE ON HOLD IN MICROBIOLOGY DEPT FOR 3 DAYS.  IF UNPRESERVED URINE IS SUBMITTED, IT CANNOT BE USED FOR ADDITIONAL TESTING AFTER 24 HRS, RECOLLECTION WILL BE REQUIRED. CULTURE, BLOOD, PAIRED Collection Time: 05/11/19 11:37 AM  
Result Value Ref Range Special Requests: NO SPECIAL REQUESTS Culture result:     
  THREE OF FOUR BOTTLES HAVE BEEN FLAGGED POSITIVE BY INSTRUMENT. BOTTLES HAVE BEEN SENT TO [facility] TO ASSESS FOR POSSIBLE GROWTH. Culture result: REMAINING BOTTLE(S) HAS/HAVE NO GROWTH SO FAR    
LIPASE Collection Time: 05/11/19 11:37 AM  
Result Value Ref Range Lipase 51 (L) 73 - 393 U/L  
POC LACTIC ACID Collection Time: 05/11/19 12:57 PM  
Result Value Ref Range Lactic Acid (POC) 5.71 (HH) 0.40 - 2.00 mmol/L  
LACTIC ACID Collection Time: 05/11/19  3:37 PM  
Result Value Ref Range Lactic acid 2.9 (HH) 0.4 - 2.0 MMOL/L  
CBC WITH AUTOMATED DIFF Collection Time: 05/11/19  3:37 PM  
Result Value Ref Range WBC 27.4 (H) 4.1 - 11.1 K/uL  
 RBC 4.45 4.10 - 5.70 M/uL  
 HGB 13.3 12.1 - 17.0 g/dL HCT 40.7 36.6 - 50.3 % MCV 91.5 80.0 - 99.0 FL  
 MCH 29.9 26.0 - 34.0 PG  
 MCHC 32.7 30.0 - 36.5 g/dL  
 RDW 13.5 11.5 - 14.5 % PLATELET 206 400 - 486 K/uL MPV 10.0 8.9 - 12.9 FL  
 NRBC 0.0 0  WBC ABSOLUTE NRBC 0.00 0.00 - 0.01 K/uL NEUTROPHILS 93 (H) 32 - 75 % LYMPHOCYTES 4 (L) 12 - 49 % MONOCYTES 3 (L) 5 - 13 % EOSINOPHILS 0 0 - 7 % BASOPHILS 0 0 - 1 % IMMATURE GRANULOCYTES 0 0.0 - 0.5 % ABS. NEUTROPHILS 25.5 (H) 1.8 - 8.0 K/UL  
 ABS. LYMPHOCYTES 1.1 0.8 - 3.5 K/UL  
 ABS. MONOCYTES 0.8 0.0 - 1.0 K/UL  
 ABS. EOSINOPHILS 0.0 0.0 - 0.4 K/UL  
 ABS. BASOPHILS 0.0 0.0 - 0.1 K/UL  
 ABS. IMM. GRANS. 0.0 0.00 - 0.04 K/UL  
 DF AUTOMATED    
 RBC COMMENTS NORMOCYTIC, NORMOCHROMIC METABOLIC PANEL, BASIC Collection Time: 05/11/19  3:37 PM  
Result Value Ref Range Sodium 129 (L) 136 - 145 mmol/L Potassium 4.8 3.5 - 5.1 mmol/L  Chloride 107 97 - 108 mmol/L  
 CO2 17 (L) 21 - 32 mmol/L  
 Anion gap 5 5 - 15 mmol/L Glucose 151 (H) 65 - 100 mg/dL BUN 37 (H) 6 - 20 MG/DL Creatinine 1.75 (H) 0.70 - 1.30 MG/DL  
 BUN/Creatinine ratio 21 (H) 12 - 20 GFR est AA 48 (L) >60 ml/min/1.73m2 GFR est non-AA 39 (L) >60 ml/min/1.73m2 Calcium 7.6 (L) 8.5 - 10.1 MG/DL ANKLE BRACHIAL INDEX Collection Time: 05/11/19  4:52 PM  
Result Value Ref Range Right arm  mmHg Left posterior tibial 86 mmHg Right posterior tibial 100 mmHg Left anterior tibial 126 mmHg Right anterior tibial 125 mmHg Left CHASE 1.19 Right CHASE 1.18 Left toe pressure 63 mmHg Right toe pressure 69 mmHg Left TBI 0.59 Right TBI 0.65 LACTIC ACID Collection Time: 05/11/19  7:24 PM  
Result Value Ref Range Lactic acid 2.4 (HH) 0.4 - 2.0 MMOL/L  
CBC WITH AUTOMATED DIFF Collection Time: 05/12/19  3:10 AM  
Result Value Ref Range WBC 20.5 (H) 4.1 - 11.1 K/uL  
 RBC 4.07 (L) 4.10 - 5.70 M/uL  
 HGB 12.5 12.1 - 17.0 g/dL HCT 37.1 36.6 - 50.3 % MCV 91.2 80.0 - 99.0 FL  
 MCH 30.7 26.0 - 34.0 PG  
 MCHC 33.7 30.0 - 36.5 g/dL  
 RDW 14.0 11.5 - 14.5 % PLATELET 067 413 - 468 K/uL MPV 10.2 8.9 - 12.9 FL  
 NRBC 0.0 0  WBC ABSOLUTE NRBC 0.00 0.00 - 0.01 K/uL NEUTROPHILS 89 (H) 32 - 75 % LYMPHOCYTES 6 (L) 12 - 49 % MONOCYTES 5 5 - 13 % EOSINOPHILS 0 0 - 7 % BASOPHILS 0 0 - 1 % IMMATURE GRANULOCYTES 0 0.0 - 0.5 % ABS. NEUTROPHILS 18.3 (H) 1.8 - 8.0 K/UL  
 ABS. LYMPHOCYTES 1.2 0.8 - 3.5 K/UL  
 ABS. MONOCYTES 0.9 0.0 - 1.0 K/UL  
 ABS. EOSINOPHILS 0.0 0.0 - 0.4 K/UL  
 ABS. BASOPHILS 0.0 0.0 - 0.1 K/UL  
 ABS. IMM. GRANS. 0.1 (H) 0.00 - 0.04 K/UL  
 DF AUTOMATED METABOLIC PANEL, BASIC Collection Time: 05/12/19  3:10 AM  
Result Value Ref Range Sodium 137 136 - 145 mmol/L Potassium 3.9 3.5 - 5.1 mmol/L Chloride 110 (H) 97 - 108 mmol/L  
 CO2 18 (L) 21 - 32 mmol/L Anion gap 9 5 - 15 mmol/L Glucose 116 (H) 65 - 100 mg/dL BUN 40 (H) 6 - 20 MG/DL Creatinine 1.21 0.70 - 1.30 MG/DL  
 BUN/Creatinine ratio 33 (H) 12 - 20 GFR est AA >60 >60 ml/min/1.73m2 GFR est non-AA >60 >60 ml/min/1.73m2 Calcium 7.7 (L) 8.5 - 10.1 MG/DL MAGNESIUM Collection Time: 05/12/19  3:10 AM  
Result Value Ref Range Magnesium 1.8 1.6 - 2.4 mg/dL LACTIC ACID Collection Time: 05/12/19  3:19 AM  
Result Value Ref Range Lactic acid 1.7 0.4 - 2.0 MMOL/L Telemetry:normal sinus rhythm Imaging: 
I have personally reviewed the patients radiographs and have reviewed the reports: 
Clear cxr Total critical care time exclusive of procedures:  
Lorri Adkins MD

## 2019-05-12 NOTE — PROGRESS NOTES
@0598 Bedside and Verbal shift change report given to Pradeep Armenta RN (oncoming nurse) by Nargis Liu RN (offgoing nurse). Report included the following information SBAR, Kardex, ED Summary, Procedure Summary, Intake/Output, MAR, Accordion, Recent Results, Med Rec Status, Cardiac Rhythm Sinus and Alarm Parameters . @3979 Noted orders for 1 set of blood cultures. Clarified orders with Dr. Akin Diaz and completed. Passed Bedside STAND and per Dr. Lindy Smalls with Levophed at 11 mcg/min okay to feed if he passes STAND. 
 
@1300 Dr. Akin Diaz and Urologist arrive and exam patient. Discuss case with daughter. @1900 Bedside and Verbal shift change report given to Nargis Liu RN (oncoming nurse) by Pradeep Armenta RN (offgoing nurse). Report included the following information SBAR, Kardex, ED Summary, Procedure Summary, Intake/Output, MAR, Accordion, Recent Results, Med Rec Status, Cardiac Rhythm Sinus and Alarm Parameters .

## 2019-05-13 NOTE — PROGRESS NOTES
I will ask covering  to please do full assessment as CM today attempted and are waiting for a return phone call. Please see prvious note.  
Joanie Caballero

## 2019-05-13 NOTE — PROGRESS NOTES
2000 Bedside and Verbal shift change report given to Bolivar Cunningham RN (oncoming nurse) by Tina Jha RN (offgoing nurse). Report included the following information SBAR, Kardex, ED Summary, OR Summary, Procedure Summary, Intake/Output, MAR and Recent Results. Care plan updated on patient. Patient presented on admission with reddish purple buttocks and sacrum that is not blanching in certain areas unsure if a stage 2 or deep tissue injury wound care was consulted last night to assess patients skin. The patient has been on the turn team turning every 2 hours and is a quad. The patient has been getting zinc applied to area but otherwise open to air. Primary Nurse Woody Martínez RN and Tina Jha RN performed a dual skin assessment on this patient Impairment noted- see wound doc flow sheet Dennis score is 11.

## 2019-05-13 NOTE — PROGRESS NOTES
Kin Valles [facility] 
[address] [facility], [place], [address] [place] [address] 
[phone] Medical Progress Note NAME: Jose Cardenas :  1950 MRM:  [id number] Date/Time: 2019 Subjective: Chief Complaint:  F/u sepsis No acute events, improving slowly. Pt remains on vasopressors; however requirement is decreasing. Pt states he is 'ok'. History is limited overall due to TBI Objective:  
 
 
Vitals:  
 
 
  
Last 24hrs VS reviewed since prior progress note. Most recent are: 
 
Visit Vitals BP 99/64 Pulse (!) 107 Temp 99.5 °F (37.5 °C) Resp 29 Ht 6' (1.829 m) Wt 83.9 kg (184 lb 15.5 oz) SpO2 100% BMI 25.09 kg/m² SpO2 Readings from Last 6 Encounters:  
19 100% 19 98% 19 98% 19 98% 19 98% 19 97% Intake/Output Summary (Last 24 hours) at 2019 1410 Last data filed at 2019 1300 Gross per 24 hour Intake 4153.01 ml Output 1320 ml Net 2833.01 ml Exam:  
 
Physical Exam: 
 
Gen:  Chronically ill appearing, in no acute distress HEENT:  Pink conjunctivae, PERRL, hearing intact to voice, moist mucous membranes Neck:  Supple, without masses, thyroid non-tender Resp:  No accessory muscle use, clear breath sounds without wheezes rales or rhonchi 
Card:  No murmurs, normal S1, S2 without thrills, bruits or peripheral edema Abd:  Soft, non-tender, non-distended, normoactive bowel sounds are present Musc:  No cyanosis or clubbing, contractured Skin:  Sacral wound Neuro:  No focal deficits, contractured, follows some commands appropriately Psych:  Poor insight, oriented to person Medications Reviewed: (see below) Lab Data Reviewed: (see below) 
 
______________________________________________________________________ Medications:  
 
Current Facility-Administered Medications Medication Dose Route Frequency  potassium chloride 20 mEq in 50 ml IVPB  20 mEq IntraVENous DAILY  [START ON 5/14/2019] zinc oxide-cod liver oil (DESITIN) 40 % paste   Topical DAILY  [START ON 5/14/2019] white petrolatum-mineral oil (EUCERIN) cream   Topical DAILY  bisacodyl (DULCOLAX) suppository 10 mg  10 mg Rectal DAILY PRN  
 cefTRIAXone (ROCEPHIN) 2 g in 0.9% sodium chloride (MBP/ADV) 50 mL  2 g IntraVENous Q24H  pantoprazole (PROTONIX) tablet 40 mg  40 mg Oral ACB&D  
 sodium chloride (NS) flush 5-10 mL  5-10 mL IntraVENous PRN  
 0.9% sodium chloride infusion  125 mL/hr IntraVENous CONTINUOUS  
 sodium chloride (NS) flush 5-40 mL  5-40 mL IntraVENous Q8H  
 sodium chloride (NS) flush 5-40 mL  5-40 mL IntraVENous PRN  
 acetaminophen (TYLENOL) tablet 650 mg  650 mg Oral Q4H PRN  
 naloxone (NARCAN) injection 0.4 mg  0.4 mg IntraVENous PRN  
 ondansetron (ZOFRAN) injection 4 mg  4 mg IntraVENous Q4H PRN  
 NOREPINephrine (LEVOPHED) 8 mg in dextrose 5% 250 mL infusion  0-16 mcg/min IntraVENous TITRATE  [START ON 5/16/2019] ergocalciferol capsule 50,000 Units  50,000 Units Oral every Thursday  
 menthol-zinc oxide (CALMOSEPTINE) 0.44-20.6 % ointment 1 Each  1 Each Topical DAILY PRN  
 oxybutynin (DITROPAN) tablet 5 mg  5 mg Oral TID  [START ON 5/14/2019] traMADol (ULTRAM) tablet 75 mg  75 mg Oral EVERY TU & FRI  
 baclofen (LIORESAL) tablet 2.5 mg  2.5 mg Oral TID  traMADol (ULTRAM) tablet 75 mg  75 mg Oral Q6H PRN  
 heparin (porcine) injection 5,000 Units  5,000 Units SubCUTAneous Q8H Lab Review:  
 
Recent Labs 05/13/19 
0501 05/12/19 
0310 05/11/19 
1537 WBC 5.6 20.5* 27.4* HGB 9.6* 12.5 13.3 HCT 29.0* 37.1 40.7 * 211 250 Recent Labs 05/13/19 
0501 05/12/19 
0310 05/11/19 
1537 05/11/19 
1137  137 129* 132* K 3.4* 3.9 4.8 4.9 * 110* 107 101 CO2 19* 18* 17* 17* * 116* 151* 170* BUN 19 40* 37* 39* CREA 0.74 1.21 1.75* 2.60* CA 7.5* 7.7* 7.6* 8.7 MG  --  1.8  --   --   
ALB  --   --   --  2.8* SGOT  --   --   --  22 ALT  --   --   --  27 No components found for: Enrique Point Assessment / Plan:  
 
Severe sepsis / septic shock: 2/2 UTI. Lactate improving. Urine cultures growing pan sensitive proteus. 2nd organism is pending; Will continue with CTX and vanc until final speciation complete Continue vasopressors to maintain MAP >65 Bacteremia: blood cultures flagged positive. Continue abx as above and await speciation Hydronephrosis / Bladder stone / hematuria: renal function is improving with tx of severe sepsis. Urology consult 
  
  Quadriplegia / Flexion contractures: continue baclofen. turn team  
  
  Sacral wound (2/4/2019) 
-wound care  
  
  Hepatitis C virus infection cured after antiviral drug therapy (3/10/2019) - stable S/P  shunt (5/11/2019) -followed by [facility]  
 
  
RLE leg discoloration: ABIs overall normal with some abnormalities noted likely due to sepsis and vasopressors Total time spent with patient: 40 Minutes Care Plan discussed with: Patient Discussed:  Care Plan Prophylaxis:  Hep SQ Disposition:   PT, OT, RN 
        
___________________________________________________ Attending Physician: Gee Foster MD

## 2019-05-13 NOTE — CONSULTS
[facility], Critical Care, and Sleep Medicine Name: Leonardo Mishra MRN: [id number] : 1950 Hospital: [address] [facility] Date: 2019 Critical Care Initial Patient Consult IMPRESSION:  
· Septic shock · Pyelonephritis/ hydronephrosis/ hydroureter · Hypotension:  Remains on levophed · Quadraplegia RECOMMENDATIONS:  
· Follow cultures, GNRs in blood and urine, probable proteus · IVF · Wean levophed as tolerated · Urology following · On ceftriaxone, will stop Vanc · Sq heparin · Ok to take po/ passed bedside swallow eval  
 
Subjective/History: This patient has been seen and evaluated at the request of Dr. Sammy Jane for septic shock. Patient is a 79 y.o. 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 Pt with hydronephrosis/hydroureter noted on abd CT and U/A with pyuria. Pt alert and will nod and shake head to simple questions. Attempts to speak at times but unintelligible. Denies pain, shortness of breath. Lactate elevated on presentation; now normal.  Remains on levophed. 24 hour interval history: 
Levophed down to 2mcg. Urology has evaluated, cysto to remove stones once more stable. Past Medical History:  
Diagnosis Date  Disorder of brainstem Bruised brainstem  Double vision  Frequent UTI  Kidney stones  Quadriplegia ([address] Past Surgical History:  
Procedure Laterality Date  HX CSF SHUNT  HX CSF SHUNT  HX ORTHOPAEDIC Left  HX OTHER SURGICAL    
 bladder stone Prior to Admission medications Medication Sig Start Date End Date Taking? Authorizing Provider  
ergocalciferol (ERGOCALCIFEROL) 50,000 unit capsule Take 50,000 Units by mouth Every Thursday. Yes Provider, Historical  
traMADol (ULTRAM) 50 mg tablet Take 75 mg by mouth every Tuesday and Friday.    Yes Provider, Historical  
 bisacodyl (DULCOLAX) 10 mg suppository Insert 10 mg into rectum two (2) times a week. Tuesday and Friday   Yes Provider, Historical  
baclofen (LIORESAL) 10 mg tablet TAKE 1 TABLET BY MOUTH THREE TIMES DAILY 4/13/19  Yes Ester Ortega NP  
oxybutynin (DITROPAN) 5 mg tablet TAKE 1 TABLET BY MOUTH THREE TIMES DAILY 4/13/19  Yes Ester Ortega NP  
miconazole (ZEASORB AF) 2 % topical powder Apply  to affected area two (2) times daily as needed. Yes Provider, Historical  
Menthol-Zinc Oxide (CALMOSEPTINE) 0.44-20.6 % oint Apply 1 Each to affected area daily as needed. apply to back and buttocks daily and as needed for soiling   Yes Provider, Historical  
 
Current Facility-Administered Medications Medication Dose Route Frequency  Vancomycin trough 5/13/19 @ 2000   Other ONCE  
 cefTRIAXone (ROCEPHIN) 2 g in 0.9% sodium chloride (MBP/ADV) 50 mL  2 g IntraVENous Q24H  pantoprazole (PROTONIX) tablet 40 mg  40 mg Oral ACB&D  
 vancomycin (VANCOCIN) 1,000 mg in 0.9% sodium chloride (MBP/ADV) 250 mL  1,000 mg IntraVENous Q18H  
 0.9% sodium chloride infusion  125 mL/hr IntraVENous CONTINUOUS  
 sodium chloride (NS) flush 5-40 mL  5-40 mL IntraVENous Q8H  
 NOREPINephrine (LEVOPHED) 8 mg in dextrose 5% 250 mL infusion  0-16 mcg/min IntraVENous TITRATE  [START ON 5/16/2019] ergocalciferol capsule 50,000 Units  50,000 Units Oral every Thursday  
 oxybutynin (DITROPAN) tablet 5 mg  5 mg Oral TID  [START ON 5/14/2019] traMADol (ULTRAM) tablet 75 mg  75 mg Oral EVERY TU & FRI  
 baclofen (LIORESAL) tablet 2.5 mg  2.5 mg Oral TID  heparin (porcine) injection 5,000 Units  5,000 Units SubCUTAneous Q8H No Known Allergies Social History Tobacco Use  Smoking status: Former Smoker  Smokeless tobacco: Never Used Substance Use Topics  Alcohol use: No  
  
History reviewed. No pertinent family history. Review of Systems: 
Review of systems not obtained due to patient factors. Objective: 
Vital Signs:   
Visit Vitals /55 Pulse (!) 107 Temp 99 °F (37.2 °C) Resp 23 Ht 6' (1.829 m) Wt 83.9 kg (184 lb 15.5 oz) SpO2 98% BMI 25.09 kg/m² O2 Device: Room air Temp (24hrs), Av °F (37.2 °C), Min:98.7 °F (37.1 °C), Max:99.1 °F (37.3 °C) Intake/Output:  
Last shift:      701 - 1900 In: 321.4 [P.O.:60; I.V.:261.4] Out: 295 [Urine:295] Last 3 shifts: 1901 -  07 In: 6296.5 [P.O.:400; I.V.:5896.5] Out: 2500 [Urine:2500] Intake/Output Summary (Last 24 hours) at 2019 9915 Last data filed at 2019 0900 Gross per 24 hour Intake 4507.48 ml Output 1595 ml Net 2912.48 ml Hemodynamics:  
PAP:   CO:    
Wedge:   CI:    
CVP:    SVR:    
  PVR:    
 
Ventilator Settings: 
Mode Rate Tidal Volume Pressure FiO2 PEEP Peak airway pressure:     
Minute ventilation:     
 
Physical Exam: 
 
General:  Alert, cooperative, chronically ill Head:  Normocephalic, without obvious abnormality, atraumatic. Eyes:  Conjunctivae/corneas clear. PERRL, EOMs intact. Nose: Nares normal.   
Throat: Lips, mucosa, and tongue normal. Teeth and gums normal.  
Neck: Supple, symmetrical.  Old trach scar Back:   Symmetric, no curvature. ROM normal.  
Lungs:   Clear to auscultation bilaterally. Fair insp effort Chest wall:  No tenderness or deformity. Heart:  Regular rate and rhythm, S1, S2 normal, no murmur, click, rub or gallop. Abdomen:   Soft, non-tender. Protuberant. Flank edema Extremities: Muscle wasting, edema Pulses: 2+ and symmetric all extremities. Skin: Skin color, texture, turgor normal. No rashes or lesions Lymph nodes: Cervical, supraclavicular, and axillary nodes normal.  
Neurologic: quadraplegia Data:  
 
Recent Results (from the past 24 hour(s)) CULTURE, BLOOD Collection Time: 19  9:29 AM  
Result Value Ref Range Special Requests: NO SPECIAL REQUESTS Culture result: NO GROWTH AFTER 21 HOURS    
CBC W/O DIFF Collection Time: 05/13/19  5:01 AM  
Result Value Ref Range WBC 5.6 4.1 - 11.1 K/uL  
 RBC 3.22 (L) 4.10 - 5.70 M/uL HGB 9.6 (L) 12.1 - 17.0 g/dL HCT 29.0 (L) 36.6 - 50.3 % MCV 90.1 80.0 - 99.0 FL  
 MCH 29.8 26.0 - 34.0 PG  
 MCHC 33.1 30.0 - 36.5 g/dL  
 RDW 14.3 11.5 - 14.5 % PLATELET 715 (L) 970 - 400 K/uL MPV 9.6 8.9 - 12.9 FL  
 NRBC 0.0 0  WBC ABSOLUTE NRBC 0.00 0.00 - 0.01 K/uL METABOLIC PANEL, BASIC Collection Time: 05/13/19  5:01 AM  
Result Value Ref Range Sodium 141 136 - 145 mmol/L Potassium 3.4 (L) 3.5 - 5.1 mmol/L Chloride 111 (H) 97 - 108 mmol/L  
 CO2 19 (L) 21 - 32 mmol/L Anion gap 11 5 - 15 mmol/L Glucose 122 (H) 65 - 100 mg/dL BUN 19 6 - 20 MG/DL Creatinine 0.74 0.70 - 1.30 MG/DL  
 BUN/Creatinine ratio 26 (H) 12 - 20 GFR est AA >60 >60 ml/min/1.73m2 GFR est non-AA >60 >60 ml/min/1.73m2 Calcium 7.5 (L) 8.5 - 10.1 MG/DL Telemetry:normal sinus rhythm Imaging: 
I have personally reviewed the patients radiographs and have reviewed the reports: 
Clear cxr Total critical care time exclusive of procedures: 35 minutes Alana Mehta MD

## 2019-05-13 NOTE — PROGRESS NOTES
Progress Note Patient: Montserrat Hussein MRN: [id number]  SSN: [id number] YOB: 1950  Age: 79 y.o. Sex: male ADMITTED:  2019 to Skye Cox MD  for Severe sepsis ([facility] [A41.9, R65.20] Montserrat Hussein was admitted for Severe sepsis ([facility] [A41.9, R65.20]. Retention / uti Vitals:  Temp (24hrs), Av °F (37.2 °C), Min:98.7 °F (37.1 °C), Max:99.1 °F (37.3 °C) Blood pressure 123/63, pulse (!) 113, temperature 99 °F (37.2 °C), resp. rate 20, height 6' (1.829 m), weight 83.9 kg (184 lb 15.5 oz), SpO2 99 %. I&O's:   190 -  0700 In: 6296.5 [P.O.:400; I.V.:5896.5] Out: 2500 [Urine:2500]  07 -  190 In: 315.8 [P.O.:60; I.V.:255.8] Out: 250 [Urine:250] Exam:   NAD. Urine clear Labs:  
Recent Labs 19 
0501 19 
0310 19 
1537 WBC 5.6 20.5* 27.4* HGB 9.6* 12.5 13.3 HCT 29.0* 37.1 40.7 * 211 250 Recent Labs 19 
0501 19 
0310 19 
1537  137 129*  
K 3.4* 3.9 4.8  
* 110* 107 CO2 19* 18* 17* * 116* 151* BUN 19 40* 37* CREA 0.74 1.21 1.75* CA 7.5* 7.7* 7.6* Cultures:   pending Imaging:    
 
Assessment:  
 
- Principal Problem: 
  Severe sepsis ([facility] (2019) Active Problems: 
  Quadriplegia ([address] (2019) Flexion contractures (2019) Sacral wound (2019) Nephrolithiasis (2019) Hepatitis C virus infection cured after antiviral drug therapy (3/10/2019) Hydronephrosis (2019) S/P  shunt (2019) UTI (urinary tract infection) (2019) DNR (do not resuscitate) (2019) Hematuria (2019) Plan:  
 
- rx uti  / sepsis, improving 
- cysto and rpg once improved, to remove bladder stones and assess hydro Signed By: Naye Hernandez MD - May 13, 2019

## 2019-05-13 NOTE — PROGRESS NOTES
5/13/2019 
1:55 PM 
 
CM called pt's daughter Liborio Rider- [phone], left msg for call back to complete assessment. Robbi Santana, 
Care Management

## 2019-05-13 NOTE — WOUND CARE
79 y.o. male with PMH including quadraplegia (S/P trauma injury) and  shunt admitted for sepsis. Wound care consulted for skin eval.  
 
Assessment: 
Patient is on specialty ICU surface with family at bedside. Patient states he has been a quadriplegic since the 1970s. His mother assists in his care at home, but has been sick and not able to offload the patient as often. His sister is concerned he may have skin breakdown because of this. Head to toe assessment finds generalized edema, contractures and deformities of the fingers to both hands. The left hand fingernails have a fungal appearance which the family had been treating at home with OTC antifungals ([facility] practice notified). There is a small 2x1cm resolving injury to the right lateral proximal shin with blanching erythema to the surrounding skin. The left knee has resolved scars from an old injury (per sister at bedside). The right second toe has two small purple raised dry areas that appear to be resolving blisters measuring a total approximate size of 0.5x1cm. Patient was turned to the right side in bed and noted scant stool on assessment. Cleansed and applied a new dryflow pad. Bilateral inner glutes had blanching moisture related erythema and moisture erosion. There is a midline friable inner gluteal fissure measuring approximately 2cm in length. The upper back is red and dry with flaking skin. Recommendation: 
Daily with skin care apply lotion to extremities and bony prominences to protect from friction/shear. Float heels and protect (with prevelon boots). Turn Q2h while in bed (with turn team). Protect from lines and devices. When up in chair use a waffle cushion and reposition every 20 minutes. Recommend Desitin ointment daily to gluteal erosion and as needed. Suggest Eucerin lotion to the preened areas on the back.   
 
Will Follow, Consult as needed,  
Ling Simon

## 2019-05-13 NOTE — PROGRESS NOTES
[facility] Dosing Services: Antimicrobial Stewardship Progress Note 5/13/2019 Consult for antibiotic dosing of Vancomycin by Dr. Cleon Kawasaki Pharmacist reviewed antibiotic appropriateness for 80 yo male for indication of Pyelonephritis, chronic arias PTA. History of enterococcal UTI in 2009 Day of Therapy: 3 Ferol Isabellaer Ht Readings from Last 1 Encounters:  
05/11/19 182.9 cm (72\") Wt Readings from Last 1 Encounters:  
05/13/19 83.9 kg (184 lb 15.5 oz) Vancomycin therapy: 
Current maintenance dose: 1000 mg Q18 hrs Dose calculated to approximate a therapeutic trough of 10-15mcg/mL. Last trough level: none yet Plan for level / Adjustment in Therapy: Renal function back to baseline, vancomycin dose empirically increased to 1000 mg Q18 hours. As renal function improved again will order a 12 hour level for this evening as opposed to an 18 hour level tomorrow AM. Likely need to increase dose again due to significant renal function improvement and UOP. Blood/urine Cx pending finalized cx/susceptibilities. Repeat BC clear. Arias changed, afebrile, WBC significantly improved Dose administration notes:   Doses given appropriately as scheduled Other Antimicrobial  
(not dosed by pharmacist) Rocephin 2 gm IV daily Cultures 5/12: Blood: NGTD, Prelim 5/11: Blood: 3/4 bottles GNR pending 5/11: Urine: >100,000 possible proteus, second gram negative rods pending (lactose fermenting) Serum Creatinine Lab Results Component Value Date/Time Creatinine 0.74 05/13/2019 05:01 AM  
  
  
Creatinine Clearance Quadriplegia Temp Temp: 99 °F (37.2 °C) WBC Lab Results Component Value Date/Time WBC 5.6 05/13/2019 05:01 AM  
 
  
H/H Lab Results Component Value Date/Time HGB 9.6 (L) 05/13/2019 05:01 AM  
 
  
Platelets Lab Results Component Value Date/Time PLATELET 988 (L) 33/58/1203 05:01 AM  
 
  
 
Pharmacist Nathalia Santacruz, PHARMD Contact information: [phone]

## 2019-05-13 NOTE — PROGRESS NOTES
Nutrition Assessment: 
 
RECOMMENDATIONS/INTERVENTION(S):  
1. Follow SLP rec's for diet consistency. 2. Will order Ensure Enlive (vanilla) BID, Ensure Pudding (vanilla), and Magic Cup daily for pt to try and increase kcal/protein intake. 3. Monitor PO intakes of meals/ONS, weight. ASSESSMENT:  
5/13: 78 yo male admitted for sepsis. RD assessment for PU screen. PMhx: quadriplegia. Weight WNL per BMI per age. Daughter present at time of visit, provided all information for pt. States his weight has been stable at home, no change. Pt has a very good appetite at home, they do not use ONS at home because he has never needed them secondary to good PO. Starting 2 days PTA pt was c/o nausea and not wanting to eat much. No longer c/o nausea but the poor PO intake has continued since admission. Pt taking in < 50% meals. RN reports pt coughing and requested SLP consult - ordered and pt made NPO until eval.  Pt states he is willing to try supplements. Multiple wounds present. Labs reviewed. Meds: Vit D, Kcl. Diet Order: Regular 
% Eaten:   
Patient Vitals for the past 72 hrs: 
 % Diet Eaten 05/13/19 1200 0 % 05/13/19 0826 30 % 05/12/19 0943 50 % 05/11/19 1640 50 % Pertinent Medications: [x] Reviewed Labs: [x] Reviewed Anthropometrics: Height: 6' (182.9 cm) Weight: 83.9 kg (184 lb 15.5 oz) IBW (%IBW):   ( ) UBW (%UBW):   (  %) BMI: Body mass index is 25.09 kg/m². This BMI is indicative of: 
 [] Underweight    [x] Normal    [] Overweight    []  Obesity    []  Extreme Obesity (BMI>40) Estimated Nutrition Needs (Based on): 1988 Kcals/day(BMR 1657 x AF 1.2) , 84 g(84-101gm (1-1.2gm/kg/d)) Protein Carbohydrate: At Least 130 g/day  Fluids: 1988 mL/day (1 ml/kcal) Last BM: 5/12   [x]Active     []Hyperactive  []Hypoactive       [] Absent   BS Skin:    [] Intact   [] Incision  [x] Breakdown - stage II to buttock, wound to sacrum and right knee  [x] DTI - buttocks  [] Tears/Excoriation/Abrasion  []Edema [] Other: Wt Readings from Last 30 Encounters:  
05/13/19 83.9 kg (184 lb 15.5 oz) 02/07/17 68.9 kg (152 lb) NUTRITION DIAGNOSES:  
Problem:  Inadequate energy intake Etiology: related to decreased ability to consume sufficient energy Signs/Symptoms: as evidenced by PO intakes < EEN's since admission NUTRITION INTERVENTIONS: 
Meals/Snacks: General/healthful diet   Supplements: Commercial supplement GOAL:  
Consume > 75% meals + ONS within next 1-3 days Cultural, Christian, or Ethnic Dietary Needs: None EDUCATION & DISCHARGE NEEDS:  
 [x] None Identified 
 [] Identified and Education Provided/Documented 
 [] Identified and Pt declined/was not appropriate [x] Interdisciplinary Care Plan Reviewed/Documented  
 [x] Discharge Needs:    Continue diet per SLP rec's at home with ONS 2-3x/day if intakes remain < 50% [] No Nutrition Related Discharge Needs NUTRITION RISK:  
Pt Is At Nutrition Risk  [] No Nutrition Risk Identified  [] PT SEEN FOR:  
 []  MD Consult: []Calorie Count []Diabetic Diet Education []Diet Education []Electrolyte Management []General Nutrition Management and Supplements []Management of Tube Feeding []TPN Recommendations [x]  RN Referral:  []MST score >=2 
   []Enteral/Parenteral Nutrition PTA []Pregnant: Gestational DM or Multigestation  
              [x] Pressure Ulcer 
 
[]  Low BMI      []  Length of Stay       [] Dysphagia Diet         [] Ventilator 
[]  Follow-up Previous Recommendations: 
 [] Implemented          [] Not Implemented          [x] Not Applicable Previous Goal: 
 [] Met              [] Progressing Towards Goal              [] Not Progressing Towards Goal   [x] Not Applicable Jorge Anthony RD Pager [phone] Phone [phone]
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
## 2019-05-13 NOTE — PROGRESS NOTES
DAILY PROGRESS NOTE Name: Leonardo Mishra : 1950 MRN: [id number] Date: 2019 8:57 AM  
I have reviewed the flowsheet and previous days notes. IMPRESSION:  
· Septic shock likely due to Proteus · Hydronephrosis/nephrolithiasis · Hematuria · Hypotension, on pressor support (Levophed) · Quadriplegia with flexion contractures · S/p  shunt · Onychomycosis, right finger · Sacral ulcers PLAN:  
· Continue Pressor support; on Levophed, wean as BP tolerates · Proteus bacteruria · GNR growing in 3/4 bottles, repeat culture shows no growth x24 hrs · Cont Rocephin. D/tom Rosalita Fears · Urology following, plans on cystoscopy and rpg for stone removal once pt is stable · Continue Baclofen · Wound care following · Can treat Onychomycosis outpatient GLOBAL ISSUES:  
 
· GI Prophylaxis: Ptotonix · DVT Prophylaxis: Sq heparin · Code Status: DNR Overnight events reviewed: No acute event Subjective: Pt comfortable with daughter at bedside. Pt states that he is doing well. Denies any chest pain, sob, fever or chills. Daughter however things pt would always say he is doing well regardless of whether he is feeling otherwise. ROS: 
 
Pertinent items are noted in the subjective above Vital Signs:   
Visit Vitals /63 Pulse (!) 113 Temp 99 °F (37.2 °C) Resp 20 Ht 6' (1.829 m) Wt 184 lb 15.5 oz (83.9 kg) SpO2 99% BMI 25.09 kg/m² O2 Device: Room air Temp (24hrs), Av °F (37.2 °C), Min:98.7 °F (37.1 °C), Max:99.1 °F (37.3 °C) Intake/Output:  
Last shift:       07 -  1900 In: 315.8 [P.O.:60; I.V.:255.8] Out: 250 [Urine:250] Last 3 shifts: 1901 -  0700 In: 6296.5 [P.O.:400; I.V.:5896.5] Out: 2500 [Urine:2500] Intake/Output Summary (Last 24 hours) at 2019 0857 Last data filed at 2019 2596 Gross per 24 hour Intake 4501.88 ml Output 1550 ml Net 2951.88 ml Physical Exam: General:  Alert, cooperative, no distress, Head:  Normocephalic, without obvious abnormality, atraumatic. Eyes:  Conjunctivae/corneas clear. PERRL, EOMs intact. Lungs:   Clear to auscultation bilaterally. Heart:  Regular rate and rhythm, S1, S2 normal, no murmur, click, rub or gallop. Abdomen:   Soft, non-tender. Bowel sounds normal. No masses,  No organomegaly. Extremities: Extremities normal, atraumatic, no edema. Upper extremity contracture Skin: Sacral wound. Neurologic: Grossly nonfocal  
 
Hale: Yellow urine, no blood noted DATA:  
Current Facility-Administered Medications Medication Dose Route Frequency  bisacodyl (DULCOLAX) suppository 10 mg  10 mg Rectal DAILY PRN  
 cefTRIAXone (ROCEPHIN) 2 g in 0.9% sodium chloride (MBP/ADV) 50 mL  2 g IntraVENous Q24H  pantoprazole (PROTONIX) tablet 40 mg  40 mg Oral ACB&D  
 vancomycin (VANCOCIN) 1,000 mg in 0.9% sodium chloride (MBP/ADV) 250 mL  1,000 mg IntraVENous Q18H  
 sodium chloride (NS) flush 5-10 mL  5-10 mL IntraVENous PRN  
 0.9% sodium chloride infusion  125 mL/hr IntraVENous CONTINUOUS  
 sodium chloride (NS) flush 5-40 mL  5-40 mL IntraVENous Q8H  
 sodium chloride (NS) flush 5-40 mL  5-40 mL IntraVENous PRN  
 acetaminophen (TYLENOL) tablet 650 mg  650 mg Oral Q4H PRN  
 naloxone (NARCAN) injection 0.4 mg  0.4 mg IntraVENous PRN  
 ondansetron (ZOFRAN) injection 4 mg  4 mg IntraVENous Q4H PRN  
 NOREPINephrine (LEVOPHED) 8 mg in dextrose 5% 250 mL infusion  0-16 mcg/min IntraVENous TITRATE  [START ON 5/16/2019] ergocalciferol capsule 50,000 Units  50,000 Units Oral every Thursday  
 menthol-zinc oxide (CALMOSEPTINE) 0.44-20.6 % ointment 1 Each  1 Each Topical DAILY PRN  
 oxybutynin (DITROPAN) tablet 5 mg  5 mg Oral TID  [START ON 5/14/2019] traMADol (ULTRAM) tablet 75 mg  75 mg Oral EVERY TU & FRI  
 baclofen (LIORESAL) tablet 2.5 mg  2.5 mg Oral TID  traMADol (ULTRAM) tablet 75 mg  75 mg Oral Q6H PRN  
 heparin (porcine) injection 5,000 Units  5,000 Units SubCUTAneous Q8H Labs: 
Recent Labs 05/13/19 
0501 05/12/19 
0310 05/11/19 
1537 WBC 5.6 20.5* 27.4* HGB 9.6* 12.5 13.3 HCT 29.0* 37.1 40.7 * 211 250 Recent Labs 05/13/19 
0501 05/12/19 
0310 05/11/19 
1537 05/11/19 
1137  137 129* 132* K 3.4* 3.9 4.8 4.9 * 110* 107 101 CO2 19* 18* 17* 17* * 116* 151* 170* BUN 19 40* 37* 39* CREA 0.74 1.21 1.75* 2.60* CA 7.5* 7.7* 7.6* 8.7 MG  --  1.8  --   --   
ALB  --   --   --  2.8* SGOT  --   --   --  22 ALT  --   --   --  27 No results for input(s): PH, PCO2, PO2, HCO3, FIO2 in the last 72 hours. Imaging:  I have personally reviewed the patients radiographs and reports. 5/11/2019: 
- CT Abd/Pelv:  
1. Bilateral hydronephrosis and hydroureter and thickened urinary bladder wall 
with small stones in the bladder. 2. No renal or ureteral calculus. Probable small right renal cyst. 
3. Ventriculoperitoneal shunt tube. 4. Interstitial disease and bilateral lower lobe atelectasis. 5. Cholecystolithiasis. 6. Atherosclerotic abdominal aorta. 7. Ankylosing spondylitis. My assessment/plan was discussed with: Dr. Reji Hill (internsivist) Jesús Bruno MD

## 2019-05-13 NOTE — PROGRESS NOTES
Spiritual Care Assessment/Progress Note Noelle Iniguez 
 
 
NAME: Santo Em      MRN: [id number] AGE: 79 y.o. SEX: male Cheondoism Affiliation: No Jewish Language: [place] 5/13/2019     Total Time (in minutes): 19 Spiritual Assessment begun in [facility] 3 INTENSIVE CARE through conversation with: 
  
    []Patient        [x] Family    [] Friend(s) Reason for Consult: Initial/Spiritual assessment, critical care Spiritual beliefs: (Please include comment if needed) [x] Identifies with a loan tradition:     
   [] Supported by a loan community:        
   [] Claims no spiritual orientation:       
   [] Seeking spiritual identity:            
   [] Adheres to an individual form of spirituality:       
   [] Not able to assess:                   
 
    
Identified resources for coping:  
   [x] Prayer                           
   [] Music                  [] Guided Imagery [x] Family/friends                 [] Pet visits [] Devotional reading                         [] Unknown 
   [] Other:                                      
 
 
Interventions offered during this visit: (See comments for more details) Patient Interventions: Initial/Spiritual assessment, Critical care Plan of Care: 
 
 [] Support spiritual and/or cultural needs  
 [] Support AMD and/or advance care planning process    
 [] Support grieving process 
 [] Coordinate Rites and/or Rituals  
 [] Coordination with community clergy [] No spiritual needs identified at this time 
 [] Detailed Plan of Care below (See Comments)  [] Make referral to Music Therapy 
[] Make referral to Pet Therapy    
[] Make referral to Addiction services 
[] Make referral to [facility] 
[] Make referral to Spiritual Care Partner 
[] No future visits requested       
[x] Follow up visits as needed Comments:  This visit was to begin the process of assessing, spiritual needs and resources for . Randall García. His daughter was at bedside. She is the primary care giver for him and his wife. Her interactions with him were very supportive and caring. She does not think it a good idea for her mother to visit in the hospital but she facetimes so they can see and talk with each other. She noted that loan is important to the whole family. Prayers were offered at her request with his consent. Pastoral Care continues to be available. Please page if needed. Randa Brewer., [address] [facility], [place], [address] [place] [address] [address] [facility]

## 2019-05-13 NOTE — PROGRESS NOTES
7:25 AM 
Bedside and Verbal shift change report given to Brian Holm (oncoming nurse) by Liane Claude RN (offgoing nurse). Report included the following information SBAR, Kardex, Procedure Summary, Intake/Output, MAR, Accordion, Recent Results, Med Rec Status and Cardiac Rhythm sinus tachy. Primary Nurse Anjelica Gregory RN and Madelaine Snyder RN performed a dual skin assessment on this patient Impairment noted- see wound doc flow sheet Dennis score is 11 
 
 
7:26 AM 
Patient assessed, resting comfortably 8:56 AM 
Assisted patient with breakfast, provided arias care, IV antibiotic given, Heparin given and protonix given with applesauce. Mouth care performed. 9:19 AM 
Dr. Darryl Ochoa and Dr. Susie Oliva at bedside. Dr. Darryl Ochoa suggested removing bladder stones prior to discharge home. Dr. Susie Oliva discussed possible swallowing difficulty with daughter at bedside. 9:41 AM 
Vancomycin discontinued. 10:52 AM 
Wound care nurse came to bedside,  Levophed titrated down to 2mcg and then stopped, patient's daughter at bedside. 11:53 AM 
Diet changed to NPO, speech therapy consult put in and portable chest x-ray ordered due to patient's questionable swallow changes from reported baseline and bedside swallow experience. 2:12 PM 
Speech therapy at bedside, patient failed bedside swallow test and will be NPO for water, ice chips and meds. 7:28 PM 
Bedside and Verbal shift change report given to [address] [place] [address] (oncoming nurse) by Mason Walker RN (offgoing nurse). Report included the following information SBAR, Kardex, Intake/Output, MAR, Accordion, Recent Results, Med Rec Status and Cardiac Rhythm sinus tachy. Primary Nurse Anjelica Gregory RN and Drew Collet, RN performed a dual skin assessment on this patient Impairment noted- see wound doc flow sheet Dennis score is 11

## 2019-05-14 NOTE — PROGRESS NOTES
Problem: Dysphagia (Adult) Goal: *Acute Goals and Plan of Care (Insert Text) Description Swallowing goals initiated 5-13-19: 
1)  Daily re-eval of swallowing until managing secretions better and ready for MBS by 5-17-19 Outcome: Progressing Towards Goal 
  
SPEECH LANGUAGE PATHOLOGY DYSPHAGIA TREATMENT Patient: Brodie Mason (9 y.o. male) Date: 5/14/2019 Diagnosis: Severe sepsis (H[facility]) [A41.9, R65.20] Severe sepsis ([address] Procedure(s) (LRB): 
CYSTOSCOPY WITH BILATERAL RETROGRADES (Bilateral) Precautions: swallow ASSESSMENT: 
Patient initially with wet vocal quality, however this cleared spontaneously. Patient with continued oropharyngeal dysphagia characterized by prolonged mastication, delayed posterior propulsion of solids requiring liquid wash, delayed swallow initiation, and decreased laryngeal elevation. Patient with immediate, strong coughing with thin liquids via large straw daughter brought in from home, however no overt s/s aspiration observed with thin liquids via smaller hospital straw. Patient did have delayed wet vocal quality which could indicate pharyngeal residue. Recommend MBS to fully assess pharyngeal phase of swallow, which is scheduled for today. Progression toward goals: 
?         Improving appropriately and progressing toward goals ? Improving slowly and progressing toward goals ? Not making progress toward goals and plan of care will be adjusted PLAN: 
Recommendations and Planned Interventions: 
--NPO until MBS this afternoon 
--Additional recommendations after MBS Patient continues to benefit from skilled intervention to address the above impairments. Continue treatment per established plan of care. Discharge Recommendations: To Be Determined SUBJECTIVE:  
Patient stated ? Thank you.? OBJECTIVE:  
Cognitive and Communication Status: 
Neurologic State: Alert Orientation Level: Oriented to person Cognition: Follows commands Perception: Appears intact Safety/Judgement: Lack of insight into deficits Dysphagia Treatment: P.O. Trials: 
Patient Position: upright in bed Vocal quality prior to P.O.: Low volume; Other (comment)(dysarthric, decreased intelligibility) Consistency Presented: Ice chips; Thin liquid;Puree How Presented: SLP-fed/presented;Spoon;Straw;Successive swallows Bolus Acceptance: No impairment Bolus Formation/Control: Impaired Type of Impairment: Delayed;Mastication Propulsion: Delayed (# of seconds); Other (comment)(required liquid wash to propel solid) Oral Residue: Lingual;Less than 10% of bolus; Other (comment)(minimal) Initiation of Swallow: Delayed (# of seconds) Laryngeal Elevation: Decreased Aspiration Signs/Symptoms: Strong cough; Other (comment); Change vocal quality(immediately with successive sips thin liquid via large straw) Pharyngeal Phase Characteristics: Altered vocal quality Pain: 
Pain Scale 1: Adult Nonverbal Pain Scale Pain Intensity 1: 0 After treatment:  
?              Patient left in no apparent distress sitting up in chair ? Patient left in no apparent distress in bed 
? Call bell left within reach ? Nursing notified ? Caregiver present ? Bed alarm activated COMMUNICATION/EDUCATION:  
Patient was educated regarding His deficit(s) of dysphagia as this relates to His diagnosis. He demonstrated Good understanding as evidenced by verbalizing understanding. The patient?s plan of care including recommendations, planned interventions, and recommended diet changes were discussed with: Registered Nurse. SUPRIYA Roberson Time Calculation: 20 mins

## 2019-05-14 NOTE — CDMP QUERY
Patient admitted with a sacral wound/ulcer per hospitalist progress notes. Wound Care consult does not document a sacral decub. Please clarify if a sacral decub on admission has been ruled out or specify the stage on admission. 
 
=> Sacral decubitus ulcer on admission ruled out 
=> TYPE of Ulcer (Decubitus, Diabetic, Venous stasis, other) =>SITE of Ulcer (Including laterality, if applicable) =>STAGE of Ulcer (If applicable) =>POA Status (Present on Admission (POA) or Hospital Acquired)  
=> Other, please specify 
=> Unable to be determined The medical record reflects the following: 
  Risk Factors: 78 yo m admitted with Sepsis 2/2 UTI Clinical Indicators: quadriplegic and H&P states \" Sacral wound \" 5/13 wound care note \" Bilateral inner glutes had blanching moisture related erythema and moisture erosion. There is a midline friable inner gluteal fissure measuring approximately 2cm in length\" Treatment: daily skin care, turn q2h while in bed, Desitin ointment to gluteal erosion Thank you for your time [facility] RN/BSN, CCDS Desk:   [phone] Other:  [phone]

## 2019-05-14 NOTE — CONSULTS
[facility], Critical Care, and Sleep Medicine Name: Radha Moulton MRN: [id number] : 1950 Hospital: [address] [facility] Date: 2019 Critical Care Initial Patient Consult IMPRESSION:  
· Septic shock · Pyelonephritis/ hydronephrosis/ hydroureter · Hypotension:  Remains on levophed · Quadraplegia RECOMMENDATIONS:  
· Continue ceftriaxone · IVF adjusted, K add and transitioned to D5 while NPO · Replete K 
· Urology following, stones to be removed once otherwise stable · Sq heparin · Speech evaluating, MBS today; will need to consider NG tube if unable to swallow to get PO meds · NPO Stable for transfer out of the ICU Subjective/History: This patient has been seen and evaluated at the request of Dr. Selina Stafford for septic shock. Patient is a 79 y.o. 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 Pt with hydronephrosis/hydroureter noted on abd CT and U/A with pyuria. Pt alert and will nod and shake head to simple questions. Attempts to speak at times but unintelligible. Denies pain, shortness of breath. Lactate elevated on presentation; now normal.  Remains on levophed. 24 hour interval history: 
Off of levophed. Yesterday was made NPO and speech evaluated due to concerns with swallow. Planned for MBS today. Past Medical History:  
Diagnosis Date  Disorder of brainstem Bruised brainstem  Double vision  Frequent UTI  Kidney stones  Quadriplegia ([address] Past Surgical History:  
Procedure Laterality Date  HX CSF SHUNT  HX CSF SHUNT  HX ORTHOPAEDIC Left  HX OTHER SURGICAL    
 bladder stone Prior to Admission medications Medication Sig Start Date End Date Taking? Authorizing Provider  
ergocalciferol (ERGOCALCIFEROL) 50,000 unit capsule Take 50,000 Units by mouth Every Thursday.    Yes Provider, Historical  
 traMADol (ULTRAM) 50 mg tablet Take 75 mg by mouth every Tuesday and Friday. Yes Provider, Historical  
bisacodyl (DULCOLAX) 10 mg suppository Insert 10 mg into rectum two (2) times a week. Tuesday and Friday   Yes Provider, Historical  
baclofen (LIORESAL) 10 mg tablet TAKE 1 TABLET BY MOUTH THREE TIMES DAILY 4/13/19  Yes Divine Ortega, NP  
oxybutynin (DITROPAN) 5 mg tablet TAKE 1 TABLET BY MOUTH THREE TIMES DAILY 4/13/19  Yes Divine Ortega, NP  
miconazole (ZEASORB AF) 2 % topical powder Apply  to affected area two (2) times daily as needed. Yes Provider, Historical  
Menthol-Zinc Oxide (CALMOSEPTINE) 0.44-20.6 % oint Apply 1 Each to affected area daily as needed. apply to back and buttocks daily and as needed for soiling   Yes Provider, Historical  
 
Current Facility-Administered Medications Medication Dose Route Frequency  potassium chloride 20 mEq in 50 ml IVPB  20 mEq IntraVENous Q1H  
 dextrose 5 % - 0.45% NaCl infusion  75 mL/hr IntraVENous CONTINUOUS  
 zinc oxide-cod liver oil (DESITIN) 40 % paste   Topical DAILY  white petrolatum-mineral oil (EUCERIN) cream   Topical DAILY  pantoprazole (PROTONIX) 40 mg in sodium chloride 0.9% 10 mL injection  40 mg IntraVENous Q12H  cefTRIAXone (ROCEPHIN) 2 g in 0.9% sodium chloride (MBP/ADV) 50 mL  2 g IntraVENous Q24H  
 0.9% sodium chloride infusion  125 mL/hr IntraVENous CONTINUOUS  
 sodium chloride (NS) flush 5-40 mL  5-40 mL IntraVENous Q8H  
 heparin (porcine) injection 5,000 Units  5,000 Units SubCUTAneous Q8H No Known Allergies Social History Tobacco Use  Smoking status: Former Smoker  Smokeless tobacco: Never Used Substance Use Topics  Alcohol use: No  
  
History reviewed. No pertinent family history. Review of Systems: 
Review of systems not obtained due to patient factors. Objective: 
Vital Signs:   
Visit Vitals /53 Pulse 90 Temp 98.5 °F (36.9 °C) Resp 22 Ht 6' (1.829 m) Wt 83.9 kg (184 lb 15.5 oz) SpO2 100% BMI 25.09 kg/m² O2 Device: Room air Temp (24hrs), Av.2 °F (37.3 °C), Min:98.3 °F (36.8 °C), Max:100.2 °F (37.9 °C) Intake/Output:  
Last shift:       07 - 1900 In: 318.8 [I.V.:318.8] Out: 175 [Urine:175] Last 3 shifts: 1901 -  07 In: 8275 [P.O.:60; I.V.:6343] Out: 1358 [[id number]:2864] Intake/Output Summary (Last 24 hours) at 2019 1016 Last data filed at 2019 9293 Gross per 24 hour Intake 2921.92 ml Output 1138 ml Net 1783.92 ml Hemodynamics:  
PAP:   CO:    
Wedge:   CI:    
CVP:    SVR:    
  PVR:    
 
Ventilator Settings: 
Mode Rate Tidal Volume Pressure FiO2 PEEP Peak airway pressure:     
Minute ventilation:     
 
Physical Exam: 
 
General:  Alert, cooperative, chronically ill Head:  Normocephalic, without obvious abnormality, atraumatic. Eyes:  Conjunctivae/corneas clear. PERRL, EOMs intact. Nose: Nares normal.   
Throat: Lips, mucosa, and tongue normal. Teeth and gums normal.  
Neck: Supple, symmetrical.  Old trach scar Back:   Symmetric, no curvature. ROM normal.  
Lungs:   Clear to auscultation bilaterally. Fair insp effort Chest wall:  No tenderness or deformity. Heart:  Regular rate and rhythm, S1, S2 normal, no murmur, click, rub or gallop. Abdomen:   Soft, non-tender. Protuberant. Flank edema Extremities: Muscle wasting, edema Pulses: 2+ and symmetric all extremities. Skin: Skin color, texture, turgor normal. No rashes or lesions Lymph nodes: Cervical, supraclavicular, and axillary nodes normal.  
Neurologic: quadraplegia Data:  
 
Recent Results (from the past 24 hour(s)) CBC W/O DIFF Collection Time: 19  3:39 AM  
Result Value Ref Range WBC 3.6 (L) 4.1 - 11.1 K/uL  
 RBC 2.82 (L) 4.10 - 5.70 M/uL HGB 8.5 (L) 12.1 - 17.0 g/dL HCT 25.9 (L) 36.6 - 50.3 %  MCV 91.8 80.0 - 99.0 FL  
 MCH 30.1 26.0 - 34.0 PG  
 MCHC 32.8 30.0 - 36.5 g/dL  
 RDW 14.6 (H) 11.5 - 14.5 % PLATELET 82 (L) 371 - 400 K/uL MPV 9.6 8.9 - 12.9 FL  
 NRBC 0.0 0  WBC ABSOLUTE NRBC 0.00 0.00 - 0.01 K/uL METABOLIC PANEL, BASIC Collection Time: 05/14/19  3:39 AM  
Result Value Ref Range Sodium 143 136 - 145 mmol/L Potassium 3.1 (L) 3.5 - 5.1 mmol/L Chloride 114 (H) 97 - 108 mmol/L  
 CO2 21 21 - 32 mmol/L Anion gap 8 5 - 15 mmol/L Glucose 85 65 - 100 mg/dL BUN 14 6 - 20 MG/DL Creatinine 0.58 (L) 0.70 - 1.30 MG/DL  
 BUN/Creatinine ratio 24 (H) 12 - 20 GFR est AA >60 >60 ml/min/1.73m2 GFR est non-AA >60 >60 ml/min/1.73m2 Calcium 7.5 (L) 8.5 - 10.1 MG/DL Telemetry:normal sinus rhythm Imaging: 
I have personally reviewed the patients radiographs and have reviewed the reports: 
Clear cxr Total critical care time exclusive of procedures: 35 minutes Annemarie Perez MD

## 2019-05-14 NOTE — PROGRESS NOTES
DAILY PROGRESS NOTE Name: Reggie Esposito : 1950 MRN: [id number] Date: 2019 8:57 AM  
I have reviewed the flowsheet and previous days notes. IMPRESSION:  
· Septic shock likely due to Proteus · Hydronephrosis/nephrolithiasis · Hematuria · Hypotension, on pressor support (Levophed) · Quadriplegia with flexion contractures · S/p  shunt · Onychomycosis, right finger · Sacral ulcers PLAN:  
· Off pressors (), BP well controlled · Proteus bacteruria and bacteremia · Repeat culture NG x 48hrs · Cont Rocephin based on culture specificities. D/tom Melven Mech · Urology following, plans on cystoscopy and rpg for stone removal once pt is stable · Continue Baclofen · Wound care following · Can treat Onychomycosis outpatient · Downgrade to step down GLOBAL ISSUES:  
 
· GI Prophylaxis: Ptotonix · DVT Prophylaxis: Sq heparin · Code Status: DNR 
· NPO, SLP to evaluate for MBS today. Overnight events reviewed: No acute event. Doing well off pressor support Subjective: Pt lying comfortably with daughter at bedside. Pt states that he is doing well. Denies any chest pain, sob, fever or chills. ROS: 
 
Pertinent items are noted in the subjective above Vital Signs:   
Visit Vitals /53 Pulse 90 Temp 98.5 °F (36.9 °C) Resp 22 Ht 6' (1.829 m) Wt 184 lb 15.5 oz (83.9 kg) SpO2 100% BMI 25.09 kg/m² O2 Device: Room air Temp (24hrs), Av.2 °F (37.3 °C), Min:98.3 °F (36.8 °C), Max:100.2 °F (37.9 °C) Intake/Output:  
Last shift:       07 -  1900 In: 318.8 [I.V.:318.8] Out: 175 [Urine:175] Last 3 shifts: 1901 -  0700 In: 7167 [P.O.:60; I.V.:6343] Out: 1358 [GVWFK:3789] Intake/Output Summary (Last 24 hours) at 2019 1003 Last data filed at 2019 3617 Gross per 24 hour Intake 2921.92 ml Output 1138 ml Net 1783.92 ml Physical Exam: 
General:  Alert, cooperative, no distress,   
 Head:  Normocephalic, without obvious abnormality, atraumatic. Eyes:  Conjunctivae/corneas clear. PERRL, EOMs intact. Lungs:   Clear to auscultation bilaterally. Heart:  Regular rate and rhythm, S1, S2 normal, no murmur, click, rub or gallop. Abdomen:   Soft, non-tender. Bowel sounds normal. No masses,  No organomegaly. Extremities: Extremities normal, atraumatic, no edema. Upper extremity contracture Skin: Sacral wound. Neurologic: Grossly nonfocal  
 
Hale: Yellow urine, no blood noted DATA:  
Current Facility-Administered Medications Medication Dose Route Frequency  potassium chloride 20 mEq in 50 ml IVPB  20 mEq IntraVENous Q1H  
 dextrose 5 % - 0.45% NaCl infusion  75 mL/hr IntraVENous CONTINUOUS  
 zinc oxide-cod liver oil (DESITIN) 40 % paste   Topical DAILY  white petrolatum-mineral oil (EUCERIN) cream   Topical DAILY  pantoprazole (PROTONIX) 40 mg in sodium chloride 0.9% 10 mL injection  40 mg IntraVENous Q12H  
 bisacodyl (DULCOLAX) suppository 10 mg  10 mg Rectal DAILY PRN  
 cefTRIAXone (ROCEPHIN) 2 g in 0.9% sodium chloride (MBP/ADV) 50 mL  2 g IntraVENous Q24H  
 sodium chloride (NS) flush 5-10 mL  5-10 mL IntraVENous PRN  
 0.9% sodium chloride infusion  125 mL/hr IntraVENous CONTINUOUS  
 sodium chloride (NS) flush 5-40 mL  5-40 mL IntraVENous Q8H  
 sodium chloride (NS) flush 5-40 mL  5-40 mL IntraVENous PRN  
 acetaminophen (TYLENOL) tablet 650 mg  650 mg Oral Q4H PRN  
 naloxone (NARCAN) injection 0.4 mg  0.4 mg IntraVENous PRN  
 ondansetron (ZOFRAN) injection 4 mg  4 mg IntraVENous Q4H PRN  
 menthol-zinc oxide (CALMOSEPTINE) 0.44-20.6 % ointment 1 Each  1 Each Topical DAILY PRN  
 traMADol (ULTRAM) tablet 75 mg  75 mg Oral Q6H PRN  
 heparin (porcine) injection 5,000 Units  5,000 Units SubCUTAneous Q8H Labs: 
Recent Labs 05/14/19 
1615 05/13/19 
0501 05/12/19 
0310 WBC 3.6* 5.6 20.5* HGB 8.5* 9.6* 12.5 HCT 25.9* 29.0* 37.1 PLT 82* 113* 211 Recent Labs 05/14/19 
3504 05/13/19 
0501 05/12/19 
0310  05/11/19 
1137  141 137   < > 132* K 3.1* 3.4* 3.9   < > 4.9 * 111* 110*   < > 101 CO2 21 19* 18*   < > 17* GLU 85 122* 116*   < > 170* BUN 14 19 40*   < > 39* CREA 0.58* 0.74 1.21   < > 2.60* CA 7.5* 7.5* 7.7*   < > 8.7 MG  --   --  1.8  --   --   
ALB  --   --   --   --  2.8* SGOT  --   --   --   --  22 ALT  --   --   --   --  27  
 < > = values in this interval not displayed. No results for input(s): PH, PCO2, PO2, HCO3, FIO2 in the last 72 hours. Imaging:  I have personally reviewed the patients radiographs and reports. 5/11/2019: 
- CT Abd/Pelv:  
1. Bilateral hydronephrosis and hydroureter and thickened urinary bladder wall 
with small stones in the bladder. 2. No renal or ureteral calculus. Probable small right renal cyst. 
3. Ventriculoperitoneal shunt tube. 4. Interstitial disease and bilateral lower lobe atelectasis. 5. Cholecystolithiasis. 6. Atherosclerotic abdominal aorta. 7. Ankylosing spondylitis. My assessment/plan was discussed with: Dr. Josue Jalloh (internsivist) Lb Plunkett MD

## 2019-05-14 NOTE — PROGRESS NOTES
1900- Bedside shift change report given to [address] [facility] (oncoming nurse) by Debby Jones RN (offgoing nurse). Report included the following information SBAR, Intake/Output, MAR, Recent Results, Cardiac Rhythm NSR, Alarm Parameters  and Quality Measures. Primary Nurse Robert Forde and Debby Jones, RN performed a dual skin assessment on this patient Impairment noted- see wound doc flow sheet. Desitin applied to wound on buttocks and patient turned. See wound flow sheet for more detail.

## 2019-05-14 NOTE — PROGRESS NOTES
Discussed MBS results with patient and daughter. Current plan is for patient to received dubhoff TF for 3 days and if secretion management issues improve, repeat MBS. FAmily and patient in agreement.  Spoke with RN, MD.

## 2019-05-14 NOTE — PROGRESS NOTES
Nutrition Assessment: 
 
RECOMMENDATIONS/INTERVENTION(S):  
1.  TF REC's: Jevity 1.5 at 30ml/hr, increasing to goal rate of 55ml/hr with 160ml h20 flush q4h when IVF d/c. (Goal TF will provide 1980kcals, 84gm pro, 1963ml fluid - this will meet 99% kcal needs and 100% pro needs) 2. When appropriate for PO, follow SLP rec's for diet consistency. 3. Will re-order PO supplements when diet resumes: Ensure Enlive (vanilla) BID, Ensure Pudding (vanilla), and Magic Cup daily. 4. Monitor PO intakes of meals/ONS, weight. ASSESSMENT:  
5/14: S/P SLP eval and MBS - recommending pt be NPO for 3 days. Plan is to have NGT placed and start TF. Rec's above. 5/13: 78 yo male admitted for sepsis. RD assessment for PU screen. PMhx: quadriplegia. Weight WNL per BMI per age. Daughter present at time of visit, provided all information for pt. States his weight has been stable at home, no change. Pt has a very good appetite at home, they do not use ONS at home because he has never needed them secondary to good PO. Starting 2 days PTA pt was c/o nausea and not wanting to eat much. No longer c/o nausea but the poor PO intake has continued since admission. Pt taking in < 50% meals. RN reports pt coughing and requested SLP consult - ordered and pt made NPO until eval.  Pt states he is willing to try supplements. Multiple wounds present. Labs reviewed. Meds: Vit D, Kcl. Diet Order: Regular 
% Eaten:   
Patient Vitals for the past 72 hrs: 
 % Diet Eaten 05/14/19 1200 0 % 05/14/19 0800 0 % 05/13/19 1600 0 % 05/13/19 1200 0 % 05/13/19 0826 30 % 05/12/19 0943 50 % 05/11/19 1640 50 % Pertinent Medications: [x] Reviewed Labs: [x] Reviewed Anthropometrics: Height: 6' (182.9 cm) Weight: 82.9 kg (182 lb 12.2 oz) IBW (%IBW):   ( ) UBW (%UBW):   (  %) BMI: Body mass index is 24.79 kg/m². This BMI is indicative of: [] Underweight    [x] Normal    [] Overweight    []  Obesity    []  Extreme Obesity (BMI>40) Estimated Nutrition Needs (Based on): 1988 Kcals/day(BMR 1657 x AF 1.2) , 84 g(84-101gm (1-1.2gm/kg/d)) Protein Carbohydrate: At Least 130 g/day  Fluids: 1988 mL/day (1 ml/kcal) Last BM: 5/12   [x]Active     []Hyperactive  []Hypoactive       [] Absent   BS Skin:    [] Intact   [] Incision  [x] Breakdown - stage II to buttock, wound to sacrum and right knee  [x] DTI - buttocks  [] Tears/Excoriation/Abrasion  []Edema [] Other: Wt Readings from Last 30 Encounters:  
05/14/19 82.9 kg (182 lb 12.2 oz) 02/07/17 68.9 kg (152 lb) NUTRITION DIAGNOSES:  
Problem:  Inadequate energy intake Etiology: related to decreased ability to consume sufficient energy Signs/Symptoms: as evidenced by PO intakes < EEN's since admission NUTRITION INTERVENTIONS: 
Meals/Snacks: General/healthful diet   Supplements: Commercial supplement GOAL:  
Consume > 75% meals + ONS within next 1-3 days Cultural, Hoahaoism, or Ethnic Dietary Needs: None EDUCATION & DISCHARGE NEEDS:  
 [x] None Identified 
 [] Identified and Education Provided/Documented 
 [] Identified and Pt declined/was not appropriate [x] Interdisciplinary Care Plan Reviewed/Documented  
 [x] Discharge Needs:    Continue diet per SLP rec's at home with ONS 2-3x/day if intakes remain < 50% [] No Nutrition Related Discharge Needs NUTRITION RISK:  
Pt Is At Nutrition Risk  [] No Nutrition Risk Identified  [] PT SEEN FOR:  
 []  MD Consult: []Calorie Count []Diabetic Diet Education []Diet Education []Electrolyte Management []General Nutrition Management and Supplements []Management of Tube Feeding []TPN Recommendations [x]  RN Referral:  []MST score >=2 
   []Enteral/Parenteral Nutrition PTA []Pregnant: Gestational DM or Multigestation  
              [x] Pressure Ulcer []  Low BMI      []  Length of Stay       [] Dysphagia Diet         [] Ventilator 
[]  Follow-up Previous Recommendations: 
 [] Implemented          [] Not Implemented          [x] Not Applicable Previous Goal: 
 [] Met              [] Progressing Towards Goal              [] Not Progressing Towards Goal   [x] Not Applicable Laina Mg, RD Pager [phone] Phone [phone]

## 2019-05-14 NOTE — PROGRESS NOTES
Problem: Dysphagia (Adult) Goal: *Acute Goals and Plan of Care (Insert Text) Description Swallowing goals initiated 5-13-19: 
1)  Daily re-eval of swallowing until managing secretions better and ready for repeat MBS by 5-17-19 Outcome: Progressing Towards Goal 
 SPEECH PATHOLOGY MODIFIED BARIUM SWALLOW STUDY Patient: Hosea Jernigan (34 y.o. male) Date: 5/14/2019 Primary Diagnosis: Severe sepsis ([address] [A41.9, R65.20] Procedure(s) (LRB): 
CYSTOSCOPY WITH BILATERAL RETROGRADES (Bilateral) Precautions: aspiration ASSESSMENT : 
Based on the objective data described below, the patient presents with silent aspiration with all consistencies at various times in this MBS. Aspiration was silent. Suspect any coughing occurs after aspiration reached the fatoumata. Dysphagia involves:  mild delay in swallow. Moderate pharyngeal weakness with both upper and lower pharyngeal residue. Reduced epiglottal inversion Reduced airway closure All aspiration and penetration were silent. Oral issues as well with reduced labial closure, poor suck poor oral stripping. PO would be unsafe at this time. Uncertain how far from baseline his swallow is currently. CXR is negative at this time. He is admitted with sepsis. Patient will benefit from skilled intervention to address the above impairments. Patient?s rehabilitation potential is considered to be Fair Factors which may influence rehabilitation potential include:  
? None noted ? Mental ability/status ? Medical condition ? Home/family situation and support systems ? Safety awareness ? Pain tolerance/management ? Other: PLAN : 
Recommendations and Planned Interventions: 
NPO, including meds. Dubhoff feeding tube for 3 days. Repeat MBS Friday to determine progress with swallowing. He will need to show improved secretion management before MBS repeated. Frequency/Duration: Patient will be followed by speech-language pathology 3 times a week to address goals. Discharge Recommendations: [facility] SUBJECTIVE:  
Patient unable to communicate. FAmily not available. Will return this afternooon to talk with daughter. ?. 
 
OBJECTIVE:  
 
Past Medical History:  
Diagnosis Date Disorder of brainstem Bruised brainstem Double vision Frequent UTI Kidney stones Quadriplegia ([address] Past Surgical History:  
Procedure Laterality Date HX CSF SHUNT    
 HX CSF SHUNT    
 HX ORTHOPAEDIC Left HX OTHER SURGICAL    
 bladder stone Prior Level of Function/Home Situation:  
Home Situation Home Environment: Private residence # Steps to Enter: 0 One/Two Story Residence: One story Living Alone: No 
Support Systems: Family member(s) Patient Expects to be Discharged to[de-identified] Private residence Current DME Used/Available at Home: Wheelchair, power, Shower chair, Other (comment)(ceiling lift ) Diet prior to admission: regular, thins Current Diet:  NPO Radiologist: Doreen Flynn Film Views: Lateral 
Patient Position: upright in Haused chair Trial 1: Trial 2:  
Consistency Presented: Thin liquid; Nectar thick liquid;Puree;Pudding How Presented: SLP-fed/presented;Spoon;Straw ORAL PHASE:     
Bolus Acceptance: (straw suck issues wiht reduced labial closure) Bolus Formation/Control: Impaired(lingual pumping noted): (generally disorganized a-p propulsion wiht piecemeal deglutition)  :    
Propulsion: Discoordination Oral Residue: (mild general residue throughout oral cavity) PHARYNGEAL PHASE:     
Initiation of Swallow: Triggered at vallecula Timing: Pooling 1-5 sec Penetration: (penetration with thins intermittently during the swallow due to delay and after from residue.  penetration was silent in trace amounts and pooled on top of the cords. ) Aspiration/Timing: Silent ;Trace;Repeated(with thins and nectars in trace drops intermittently and frequently and consistently in a trace stream from both upper and lower pharyngeal residue) Pharyngeal Clearance: (moderate vallecular residue wiht purees and mild with thins, mild-moderate residue with NTL. poor awareness of residue. REsidue was reduced with sip of thins, but not cleared. less residue in pyriforms than valleculae. ) Attempted Modifications: Alternate liquids/solids; Double swallow Effective Modifications: Double swallow Cues for Modifications: Moderate Trial 3: Trial 4:  
     
     
     
     
 :    :    
     
     
    
     
     
     
     
     
     
     
     
 
Decreased Tongue Base Retraction?: Yes(mild-moderate) Laryngeal Elevation: Inadequate epiglottic inversion; Reduced excursion with laryngeal vestibule gap Aspiration/Penetration Score: 8 (Aspiration-Contrast passes cords/glottis with no effort to eject, ie/silent aspiration) Pharyngeal Symmetry: Not assessed Pharyngeal-Esophageal Segment: No impairment Pharyngeal Dysfunction: Decreased tongue base retraction;Decreased strength;Decreased elevation/closure NOMS:  
The NOMS functional outcome measure was used to quantify this patient's level of swallowing impairment. Based on the NOMS, the patient was determined to be at level 2 for swallow function NOMS Swallowing Levels: 
Level 1 (CN): NPO Level 2 (CM): NPO but takes consistency in therapy Level 3 (CL): Takes less than 50% of nutrition p.o. and continues with nonoral feedings; and/or safe with mod cues; and/or max diet restriction Level 4 (CK): Safe swallow but needs mod cues; and/or mod diet restriction; and/or still requires some nonoral feeding/supplements Level 5 (CJ): Safe swallow with min diet restriction; and/or needs min cues Level 6 (CI): Independent with p.o.; rare cues; usually self cues; may need to avoid some foods or needs extra time Level 7 (CH): Independent for all p.o. KASSANDRA. (2003). National Outcomes Measurement System (NOMS): Adult Speech-Language Pathology User's Guide. COMMUNICATION/EDUCATION:  
Patient was educated regarding His deficit(s) of dysphagia  as this relates to His diagnosis of sepsis. He demonstrated Guarded understanding as evidenced by lack of ability to communicate. The patient?s plan of care including findings from [facility], recommendations, planned interventions, and recommended diet changes were discussed with: Registered Nurse and Physician. ? Posted safety precautions in patient's room. ? Patient/family have participated as able in goal setting and plan of care. ?  Patient/family agree to work toward stated goals and plan of care. ?  Patient understands intent and goals of therapy, but is neutral about his/her participation. ? Patient is unable to participate in goal setting and plan of care. Thank you for this referral. 
SUPRIYA Dukes Time Calculation: 15 mins

## 2019-05-14 NOTE — PROGRESS NOTES
[facility] 
[address], [place], [address] [facility] 
[phone] Medical Progress Note NAME: Joann Colunga :  1950 MRM:  [id number] Date/Time: 2019 Subjective: Chief Complaint:  Sepsis, UTI No acute events, improving slowly. Pt states he is 'ok'. Wants to eat SLP eval soon Objective:  
 
 
Vitals:  
 
 
  
Last 24hrs VS reviewed since prior progress note. Most recent are: 
 
Visit Vitals /53 Pulse 90 Temp 98.5 °F (36.9 °C) Resp 22 Ht 6' (1.829 m) Wt 83.9 kg (184 lb 15.5 oz) SpO2 100% BMI 25.09 kg/m² SpO2 Readings from Last 6 Encounters:  
19 100% 19 98% 19 98% 19 98% 19 98% 19 97% Intake/Output Summary (Last 24 hours) at 2019 7470 Last data filed at 2019 7390 Gross per 24 hour Intake 2732.33 ml Output 1163 ml Net 1569.33 ml Exam:  
 
Physical Exam: 
 
Gen:  Chronically ill appearing, in no acute distress HEENT:  Pink conjunctivae, PERRL, hearing intact to voice, moist mucous membranes Neck:  Supple, without masses, thyroid non-tender Resp:  No accessory muscle use, clear breath sounds without wheezes rales or rhonchi 
Card:  No murmurs, normal S1, S2 without thrills, bruits or peripheral edema Abd:  Soft, non-tender, non-distended, normoactive bowel sounds are present Musc:  No cyanosis or clubbing, contractured Skin:  Thin, moist 
Neuro:  No focal deficits, contractured, follows some commands appropriately Psych:  Poor insight, oriented to person Medications Reviewed: (see below) Lab Data Reviewed: (see below) 
 
______________________________________________________________________ Medications:  
 
Current Facility-Administered Medications Medication Dose Route Frequency  potassium chloride 20 mEq in 50 ml IVPB  20 mEq IntraVENous Q1H  
  dextrose 5 % - 0.45% NaCl infusion  75 mL/hr IntraVENous CONTINUOUS  
 zinc oxide-cod liver oil (DESITIN) 40 % paste   Topical DAILY  white petrolatum-mineral oil (EUCERIN) cream   Topical DAILY  pantoprazole (PROTONIX) 40 mg in sodium chloride 0.9% 10 mL injection  40 mg IntraVENous Q12H  
 bisacodyl (DULCOLAX) suppository 10 mg  10 mg Rectal DAILY PRN  
 cefTRIAXone (ROCEPHIN) 2 g in 0.9% sodium chloride (MBP/ADV) 50 mL  2 g IntraVENous Q24H  
 sodium chloride (NS) flush 5-10 mL  5-10 mL IntraVENous PRN  
 0.9% sodium chloride infusion  125 mL/hr IntraVENous CONTINUOUS  
 sodium chloride (NS) flush 5-40 mL  5-40 mL IntraVENous Q8H  
 sodium chloride (NS) flush 5-40 mL  5-40 mL IntraVENous PRN  
 acetaminophen (TYLENOL) tablet 650 mg  650 mg Oral Q4H PRN  
 naloxone (NARCAN) injection 0.4 mg  0.4 mg IntraVENous PRN  
 ondansetron (ZOFRAN) injection 4 mg  4 mg IntraVENous Q4H PRN  
 menthol-zinc oxide (CALMOSEPTINE) 0.44-20.6 % ointment 1 Each  1 Each Topical DAILY PRN  
 traMADol (ULTRAM) tablet 75 mg  75 mg Oral Q6H PRN  
 heparin (porcine) injection 5,000 Units  5,000 Units SubCUTAneous Q8H Lab Review:  
 
Recent Labs 05/14/19 
0333 05/13/19 
0501 05/12/19 
0310 WBC 3.6* 5.6 20.5* HGB 8.5* 9.6* 12.5 HCT 25.9* 29.0* 37.1 PLT 82* 113* 211 Recent Labs 05/14/19 
9781 05/13/19 
0501 05/12/19 
0310  05/11/19 
1137  141 137   < > 132* K 3.1* 3.4* 3.9   < > 4.9 * 111* 110*   < > 101 CO2 21 19* 18*   < > 17* GLU 85 122* 116*   < > 170* BUN 14 19 40*   < > 39* CREA 0.58* 0.74 1.21   < > 2.60* CA 7.5* 7.5* 7.7*   < > 8.7 MG  --   --  1.8  --   --   
ALB  --   --   --   --  2.8* SGOT  --   --   --   --  22 ALT  --   --   --   --  27  
 < > = values in this interval not displayed. No components found for: Enrique Point Assessment / Plan:  
 
Severe sepsis / septic shock:  
Due to UTI and associated chronic catheter. Lactate decreased Urine cultures growing pan sensitive proteus. 2nd organism is pending 
- continue with CTX and vanc until final speciation complete - Continue vasopressors to maintain MAP >65 Bacteremia:  
blood cultures flagged positive. - Continue abx and await speciation Hydronephrosis / Bladder stone / hematuria: 
 renal function is improving with tx of severe sepsis. - Urology consulted 
  
  Quadriplegia / Flexion contractures:  
- continue baclofen. turn team  
  
  Hepatitis C virus infection cured after antiviral drug therapy (3/10/2019) - stable S/P  shunt (5/11/2019) -followed by [facility]  
 
  
RLE leg discoloration: ABIs overall normal with some abnormalities noted likely due to sepsis and vasopressors Total time spent with patient: 40 Minutes Care Plan discussed with: Patient Discussed:  Care Plan Prophylaxis:  Hep SQ Disposition:  [facility] PT, OT, RN 
        
___________________________________________________ Attending Physician: Giulia Marroquin MD

## 2019-05-14 NOTE — PROGRESS NOTES
Patient: Roberth Rick MRN: [id number]  SSN: [id number] YOB: 1950  Age: 79 y.o. Sex: male ADMITTED: 2019 to Aleida Nielson MD by Sandy Gaffney MD for [facility] [A41.9, R65.20] POD# * No surgery date entered * Procedure(s): 
CYSTOSCOPY WITH BILATERAL RETROGRADES Roberth Rick is doing well , s/p barium swallow results pending . Hale remains in place  Draining clear yellow urine . Remains afebrile . Vitals: Temp (24hrs), Av.9 °F (37.2 °C), Min:97.7 °F (36.5 °C), Max:100.2 °F (37.9 °C) Blood pressure 112/59, pulse 84, temperature 97.7 °F (36.5 °C), resp. rate 23, height 6' (1.829 m), weight 82.9 kg (182 lb 12.2 oz), SpO2 98 %. Intake and Output: 
1901 -  0700 In: 8127 [P.O.:60; I.V.:6343] Out: 1358 [XHTEW:1078] 701 -  1900 In: 693.8 [I.V.:693.8] Out: 410 [Urine:410] Exam: 
 NAD Afebrile Labs: CBC:  
Lab Results Component Value Date/Time WBC 3.6 (L) 2019 03:39 AM  
 HCT 25.9 (L) 2019 03:39 AM  
 PLATELET 82 (L)  03:39 AM  
 
BMP:  
Lab Results Component Value Date/Time Glucose 85 2019 03:39 AM  
 Sodium 143 2019 03:39 AM  
 Potassium 3.1 (L) 2019 03:39 AM  
 Chloride 114 (H) 2019 03:39 AM  
 CO2 21 2019 03:39 AM  
 BUN 14 2019 03:39 AM  
 Creatinine 0.58 (L) 2019 03:39 AM  
 Calcium 7.5 (L) 2019 03:39 AM  
 
Cultures: Component Value Flag Ref Range Units Status Special Requests:      Final  
NO SPECIAL REQUESTS [place] Count >100,000 COLONIES/mL      Final  
Culture result: Abnormal       Final  
PROTEUS MIRABILIS (PREDOMINATING) Culture result: ESCHERICHIA COLI  Abnormal      Final  
 
 
 
Imaging:  
IMPRESSION IMPRESSION:  
1. Bilateral hydronephrosis and hydroureter and thickened urinary bladder wall with small stones in the bladder. 2. No renal or ureteral calculus. Probable small right renal cyst. 
3. Ventriculoperitoneal shunt tube. 4. Interstitial disease and bilateral lower lobe atelectasis. 5. Cholecystolithiasis. 6. Atherosclerotic abdominal aorta. 7. Ankylosing spondylitis. Assessment/Plan: 1. UTI - on abx 2. Bladde stones - pt scheduled for Cysto with retrograde on  Friday Signed By: Yaw Peralta NP - May 14, 2019

## 2019-05-14 NOTE — PROGRESS NOTES
Bedside and Verbal shift change report given to Alysia Lawson (oncoming nurse) by Talisha Clark RN (offgoing nurse). Report included the following information SBAR, Kardex, Intake/Output, MAR, Accordion, Recent Results, Med Rec Status and Cardiac Rhythm sinus rhythm. Primary Nurse Joslyn Wilson RN and Tracy Mehta RN performed a dual skin assessment on this patient Impairment noted- see wound doc flow sheet Dennis score is 11 
 
7:26 AM 
Patient assessed at bedside, resting comfortably, vital signs stable, no complaints of pain or distress. 8:15 AM 
Patient turned and repositioned, mouth care performed. 9:28 AM 
Speech therapy at bedside performing swallow test. 
 
11:00 AM 
Patient downgraded to stepdown. 14:30 PM 
Patient went down for swallow study, there were swallow abnormalities leading to aspiration and patient is still NPO 
 
4:47 PM 
Daughter left bedside to go home, arias care and mouth care performed, patient assessed and resting comfortably. 6:27 PM 
Dobhoff placed, placement verified by x-ray, tube feeds started. 7:17 PM 
Bedside and Verbal shift change report given to [address] [place] [address] (oncoming nurse) by Marco Rene RN (offgoing nurse). Report included the following information SBAR, Kardex, Procedure Summary, Intake/Output, MAR, Accordion, Recent Results, Med Rec Status and Cardiac Rhythm sinus rhythm.

## 2019-05-14 NOTE — CDMP QUERY
Pt admitted with Sepsis 2/2 UTI  And Pt noted to have a chronic indwelling arias on admission. Please specify if UTI is 2/2 the chronic arias cath. 
 
=> Sepsis POA 2/2 UTI due to chronic Arias catheter 
=> UTI not due to Arias 
=> Other 
=> Clinically unable to determine The medical record reflects the following: 
   Risk Factors:  78 yo M admitted with sepsis 2/2 UTI Clinical Indicators: h/o chronic arias 5/11 ED note \"Pt also currently has a arias catheter in place that has been draining around insertion site lately\" Treatment: catheter removed and new one placed Thank you for your time [facility] RN/BSN, CCDS Desk:   [phone] Other:  [phone]

## 2019-05-15 NOTE — PROGRESS NOTES
Today's Updates/Plan 1. Per attending slow improvement. 2. SLP recommendation for SNF. 3. CM will continue to follow for discharge planning. Discharge plan 1. Poss. SNF [facility], [facility]

## 2019-05-15 NOTE — PROGRESS NOTES
Bedside shift change report given to Aruna Pa RN (oncoming nurse) by Сергей Sanchez RN (offgoing nurse). Report included the following information SBAR, MAR, Accordion, Recent Results and Med Rec Status.

## 2019-05-15 NOTE — PROGRESS NOTES
[facility] 
[address] [place] [address] 
[phone] Medical Progress Note NAME: Karen Best :  1950 MRM:  [id number] Date/Time: 5/15/2019 Subjective: Chief Complaint:  Sepsis, UTI No acute events, improving slowly. Pt states he is 'ok'. NGT placed without complication. Tolerating TF well. Gets suppository q3d SLP re-eval soon Objective:  
 
 
Vitals:  
 
 
  
Last 24hrs VS reviewed since prior progress note. Most recent are: 
 
Visit Vitals /54 (BP 1 Location: Right leg, BP Patient Position: At rest) Pulse 77 Temp 98 °F (36.7 °C) Resp 21 Ht 6' (1.829 m) Wt 82.9 kg (182 lb 12.2 oz) SpO2 99% BMI 24.79 kg/m² SpO2 Readings from Last 6 Encounters:  
05/15/19 99% 19 98% 19 98% 19 98% 19 98% 19 97% Intake/Output Summary (Last 24 hours) at 5/15/2019 0801 Last data filed at 5/15/2019 0600 Gross per 24 hour Intake 2418.75 ml Output 1073 ml Net 1345.75 ml Exam:  
 
Physical Exam: 
 
Gen:  Chronically ill appearing, in no acute distress HEENT:  Pink conjunctivae, PERRL, hearing intact to voice, moist mucous membranes Neck:  Supple, without masses, thyroid non-tender Resp:  No accessory muscle use, clear breath sounds without wheezes rales or rhonchi 
Card:  No murmurs, normal S1, S2 without thrills, bruits or peripheral edema Abd:  Soft, non-tender, non-distended, normoactive bowel sounds are present Musc:  No cyanosis or clubbing, contractured Skin:  Thin, moist 
Neuro:  No focal deficits, contractured, follows some commands appropriately Psych:  Poor insight, oriented to person Medications Reviewed: (see below) Lab Data Reviewed: (see below) 
 
______________________________________________________________________ Medications:  
 
Current Facility-Administered Medications Medication Dose Route Frequency  dextrose 5 % - 0.45% NaCl infusion  75 mL/hr IntraVENous CONTINUOUS  
 zinc oxide-cod liver oil (DESITIN) 40 % paste   Topical DAILY  white petrolatum-mineral oil (EUCERIN) cream   Topical DAILY  pantoprazole (PROTONIX) 40 mg in sodium chloride 0.9% 10 mL injection  40 mg IntraVENous Q12H  
 bisacodyl (DULCOLAX) suppository 10 mg  10 mg Rectal DAILY PRN  
 cefTRIAXone (ROCEPHIN) 2 g in 0.9% sodium chloride (MBP/ADV) 50 mL  2 g IntraVENous Q24H  
 sodium chloride (NS) flush 5-10 mL  5-10 mL IntraVENous PRN  
 0.9% sodium chloride infusion  125 mL/hr IntraVENous CONTINUOUS  
 sodium chloride (NS) flush 5-40 mL  5-40 mL IntraVENous Q8H  
 sodium chloride (NS) flush 5-40 mL  5-40 mL IntraVENous PRN  
 acetaminophen (TYLENOL) tablet 650 mg  650 mg Oral Q4H PRN  
 naloxone (NARCAN) injection 0.4 mg  0.4 mg IntraVENous PRN  
 ondansetron (ZOFRAN) injection 4 mg  4 mg IntraVENous Q4H PRN  
 menthol-zinc oxide (CALMOSEPTINE) 0.44-20.6 % ointment 1 Each  1 Each Topical DAILY PRN  
 traMADol (ULTRAM) tablet 75 mg  75 mg Oral Q6H PRN  
 heparin (porcine) injection 5,000 Units  5,000 Units SubCUTAneous Q8H Lab Review:  
 
Recent Labs 05/14/19 
2652 05/13/19 
0501 WBC 3.6* 5.6 HGB 8.5* 9.6* HCT 25.9* 29.0*  
PLT 82* 113* Recent Labs 05/14/19 
7052 05/13/19 
0501  141  
K 3.1* 3.4*  
* 111* CO2 21 19* GLU 85 122* BUN 14 19 CREA 0.58* 0.74 CA 7.5* 7.5* No components found for: Enrique Point Assessment / Plan:  
 
Severe sepsis / septic shock:  
Due to UTI and associated chronic catheter. Lactate decreased Urine cultures growing pan sensitive proteus. 2nd organism is pending 
- continue with CTX and vanc until final speciation complete - Continue vasopressors to maintain MAP >65 Bacteremia:  
blood cultures positive. - Continue abx and await speciation Hydronephrosis / Bladder stone / hematuria: 
 renal function is improving with tx of severe sepsis. - Urology consulted 
  
  Quadriplegia / Flexion contractures:  
- continue baclofen. - turn team  
  
  Hepatitis C virus infection cured after antiviral drug therapy (3/10/2019) - stable S/P  shunt (5/11/2019) 
- followed by [facility]  
 
  
RLE leg discoloration: ABIs overall normal with some abnormalities noted likely due to sepsis and vasopressors 
- monitor Total time spent with patient: 35 Minutes Care Plan discussed with: Patient Discussed:  Care Plan Prophylaxis:  Hep SQ Disposition:   PT, OT, RN 
        
___________________________________________________ Attending Physician: Elisa Goodwin MD

## 2019-05-15 NOTE — PROGRESS NOTES
Bedside and Verbal shift change report given to Fantasma Guevara (oncoming nurse) by Marlee Manzano RN (offgoing nurse). Report included the following information SBAR, Kardex, Intake/Output, MAR, Accordion, Recent Results, Med Rec Status and Cardiac Rhythm sinus rhythm. Primary Nurse Honey Stafford, RN and Matt Dang RN performed a dual skin assessment on this patient Impairment noted- see wound doc flow sheet Dennis score is 10 
 
8:00 AM 
Patient assessed, resting comfortably, tube feed in place and running, Hale in place and draining well, mouth care performed. 12:40 PM 
 
Patient assessed, Hale intact and care performed. Mouth care performed, feeding tube intact and running. No complaints of pain or discomfort. Patient was able to have a small bowel movement. 3:38 PM 
Changed dressing on triple lumen IJ, we were unsuccessful getting a peripheral IV started and patient is still getting IV antibiotics. 3:39 PM 
TRANSFER - OUT REPORT: 
 
Verbal report given to Griffin Merlos RN(name) on [facility]  being transferred to 5th Floor(unit) for routine progression of care Report consisted of patients Situation, Background, Assessment and  
Recommendations(SBAR). Information from the following report(s) SBAR, Kardex, Intake/Output, MAR, Accordion, Recent Results, Med Rec Status and Cardiac Rhythm sinus rhythm to low sinus tach was reviewed with the receiving nurse. Lines:  
Triple Lumen central line 05/11/19 Right Internal jugular (Active) Central Line Being Utilized Yes 5/15/2019 12:42 PM  
Criteria for Appropriate Use Hemodynamically unstable, requiring monitoring lines, vasopressors, or volume resuscitation 5/15/2019 12:42 PM  
Site Assessment Clean, dry, & intact 5/15/2019 12:42 PM  
Infiltration Assessment 0 5/15/2019 12:42 PM  
Affected Extremity/Extremities Color distal to insertion site pink (or appropriate for race) 5/15/2019 12:42 PM  
 Date of Last Dressing Change 05/13/19 5/15/2019  4:30 AM  
Dressing Status Clean, dry, & intact 5/15/2019 12:42 PM  
Dressing Type Disk with Chlorhexadine gluconate (CHG) 5/15/2019 12:42 PM  
Action Taken Open ports on tubing capped 5/15/2019 12:42 PM  
Proximal Hub Color/Line Status White; Infusing 5/15/2019 12:42 PM  
Positive Blood Return (Medial Site) Yes 5/15/2019 12:42 PM  
Medial Hub Color/Line Status Blue;Capped 5/15/2019 12:42 PM  
Positive Blood Return (Lateral Site) Yes 5/15/2019 12:42 PM  
Distal Hub Color/Line Status Brown;Capped 5/15/2019 12:42 PM  
Positive Blood Return (Site #3) Yes 5/15/2019 12:42 PM  
Alcohol Cap Used Yes 5/15/2019 12:42 PM  
  
 
Opportunity for questions and clarification was provided. Patient transported with: 
 [employer]

## 2019-05-15 NOTE — PROGRESS NOTES
Problem: Dysphagia (Adult) Goal: *Acute Goals and Plan of Care (Insert Text) Description Swallowing goals initiated 5-13-19: 
1)  Daily re-eval of swallowing until managing secretions better and ready for repeat MBS by 5-17-19 Outcome: Progressing Towards Goal 
 SPEECH LANGUAGE PATHOLOGY DYSPHAGIA TREATMENT Patient: Roberth Rick (2 y.o. male) Date: 5/15/2019 Diagnosis: Severe sepsis (HCC) [A41.9, R65.20] Severe sepsis ([address] Procedure(s) (LRB): 
CYSTOSCOPY WITH BILATERAL RETROGRADES (Bilateral) Precautions: aspiration ASSESSMENT: 
Patient  progressing in that he is more alert, appeared to be tolerating secretions. However, swallow remains weak. Progression toward goals: 
?         Improving appropriately and progressing toward goals ? Improving slowly and progressing toward goals ? Not making progress toward goals and plan of care will be adjusted PLAN: 
Recommendations and Planned Interventions: 
Continue with plan to repeat MBS Friday. Patient continues to benefit from skilled intervention to address the above impairments. Continue treatment per established plan of care. Discharge Recommendations:  Hernandez Alanis SUBJECTIVE:  
Patient trying to interact with SLP \"she's gone\" -about his daughter not being in his room? . 
 
OBJECTIVE:  
Cognitive and Communication Status: 
Neurologic State: Alert Orientation Level: Unable to verbalize(pt nods head yes/no to questions. difficulty understanding) Cognition: (trying to talk . voice slightly stronger, but speech intelligiblity still poor. more consistent y/n  response to personal ?'s 60%) Perception: Appears intact Safety/Judgement: Lack of insight into deficits Dysphagia Treatment: 
Oral Assessment: P.O. Trials: 
  
Vocal quality prior to P.O.: No impairment Consistency Presented: Thin liquid How Presented: SLP-fed/presented;Spoon SLP palpated swallow prior to any PO. No congestion noted. Patient with dubhoff feeding tube in . No curling noted in throat. Swabbed mouth. Some gagging noted. Provided 5 of 1/2 tsp amounts of water to check swallowing. No oral leakage. Delay in a-p transit noted. Vs delay in swallow. Moderate to severely weak swallow. Exercises: 
Laryngeal Exercises: 
  
  
  
  
  
  
  
  
  
  
  
  
  
  
  
  
  
  
  
  
  
  
  
  
  
  
  
  
  
  
  
  
  
  
  
  
  
  
  
  
  
  
  
Pain: 
Pain Scale 1: Adult Nonverbal Pain Scale Pain Intensity 1: 0 After treatment:  
?              Patient left in no apparent distress sitting up in chair ? Patient left in no apparent distress in bed 
? Call bell left within reach ? Nursing notified ? Caregiver present ? Bed alarm activated COMMUNICATION/EDUCATION:  
Patient was educated regarding His deficit(s) of dysphagia  as this relates to His diagnosis of sepsis. He demonstrated Fair understanding as evidenced by lack of ability tocommunicate. Rosa Mcgraw The patient?s plan of care including recommendations, planned interventions, and recommended diet changes were discussed with: Registered Nurse. ? Posted safety precautions in patient's room. SUPRIYA James Time Calculation: 15 mins

## 2019-05-15 NOTE — PROGRESS NOTES
1900- Bedside shift change report given to [id number] [facility] (oncoming nurse) by Rosemary Montanez RN (offgoing nurse). Report included the following information SBAR, Intake/Output, MAR, Recent Results, Cardiac Rhythm NSR, Alarm Parameters  and Quality Measures. Primary Nurse Elane Ganser and Rosemary Montanez, RN performed a dual skin assessment on this patient Impairment noted- see wound doc flow sheet. Pt has breakdown to buttocks. Desitin applied per wound care and patient remains on turn team, heels floated. See wound flow sheet for more detail.   
Dennis score is 10

## 2019-05-15 NOTE — CONSULTS
[facility], Critical Care, and Sleep Medicine Name: Leny Almanzar MRN: [id number] : 1950 Hospital: [address] [facility] Date: 5/15/2019 Critical Care Initial Patient Consult IMPRESSION:  
· Septic shock · Pyelonephritis/ hydronephrosis/ hydroureter · Hypotension:  Remains on levophed · Quadraplegia RECOMMENDATIONS:  
· Continue ceftriaxone · Replete lytes as needed · Urology following, stones to be removed once otherwise stable · Sq heparin · Speech evaluating, repeat MBS Friday · NPO, TF 
· Will restart home PO meds that had been held now that he has a feeding tube Stable for transfer out of the ICU Subjective/History: This patient has been seen and evaluated at the request of Dr. Natasha Potter for septic shock. Patient is a 79 y.o. 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 Pt with hydronephrosis/hydroureter noted on abd CT and U/A with pyuria. Pt alert and will nod and shake head to simple questions. Attempts to speak at times but unintelligible. Denies pain, shortness of breath. Lactate elevated on presentation; now normal.  Remains on levophed. 24 hour interval history: MBS yesterday. Plan for dobhoff and TF for 3 days and then repeat MBS. Patient indicates no to all ROS asked. Past Medical History:  
Diagnosis Date  Disorder of brainstem Bruised brainstem  Double vision  Frequent UTI  Kidney stones  Quadriplegia ([address] Past Surgical History:  
Procedure Laterality Date  HX CSF SHUNT  HX CSF SHUNT  HX ORTHOPAEDIC Left  HX OTHER SURGICAL    
 bladder stone Prior to Admission medications Medication Sig Start Date End Date Taking? Authorizing Provider  
ergocalciferol (ERGOCALCIFEROL) 50,000 unit capsule Take 50,000 Units by mouth Every Thursday.    Yes Provider, Historical  
 traMADol (ULTRAM) 50 mg tablet Take 75 mg by mouth every Tuesday and Friday. Yes Provider, Historical  
bisacodyl (DULCOLAX) 10 mg suppository Insert 10 mg into rectum two (2) times a week. Tuesday and Friday   Yes Provider, Historical  
baclofen (LIORESAL) 10 mg tablet TAKE 1 TABLET BY MOUTH THREE TIMES DAILY 19  Yes Rocio Ortega, JUSTEN  
oxybutynin (DITROPAN) 5 mg tablet TAKE 1 TABLET BY MOUTH THREE TIMES DAILY 19  Yes Rocio Ortega, NP  
miconazole (ZEASORB AF) 2 % topical powder Apply  to affected area two (2) times daily as needed. Yes Provider, Historical  
Menthol-Zinc Oxide (CALMOSEPTINE) 0.44-20.6 % oint Apply 1 Each to affected area daily as needed. apply to back and buttocks daily and as needed for soiling   Yes Provider, Historical  
 
Current Facility-Administered Medications Medication Dose Route Frequency  zinc oxide-cod liver oil (DESITIN) 40 % paste   Topical DAILY  white petrolatum-mineral oil (EUCERIN) cream   Topical DAILY  pantoprazole (PROTONIX) 40 mg in sodium chloride 0.9% 10 mL injection  40 mg IntraVENous Q12H  cefTRIAXone (ROCEPHIN) 2 g in 0.9% sodium chloride (MBP/ADV) 50 mL  2 g IntraVENous Q24H  
 sodium chloride (NS) flush 5-40 mL  5-40 mL IntraVENous Q8H  
 heparin (porcine) injection 5,000 Units  5,000 Units SubCUTAneous Q8H No Known Allergies Social History Tobacco Use  Smoking status: Former Smoker  Smokeless tobacco: Never Used Substance Use Topics  Alcohol use: No  
  
History reviewed. No pertinent family history. Review of Systems: 
Review of systems not obtained due to patient factors. Objective: 
Vital Signs:   
Visit Vitals /47 (BP 1 Location: Right arm, BP Patient Position: At rest) Pulse 86 Temp 97.9 °F (36.6 °C) Resp 21 Ht 6' (1.829 m) Wt 82.9 kg (182 lb 12.2 oz) SpO2 97% BMI 24.79 kg/m² O2 Device: Room air Temp (24hrs), Av.2 °F (36.8 °C), Min:97.9 °F (36.6 °C), Max:98.7 °F (37.1 °C) Intake/Output:  
Last shift:      05/15 0701 - 05/15 1900 In: 1021.3 [I.V.:161.3] Out: 250 [Urine:250] Last 3 shifts: 05/13 1901 - 05/15 0700 In: 4418.8 [I.V.:3968.8] Out: 1636 [EEGER:5438] Intake/Output Summary (Last 24 hours) at 5/15/2019 1255 Last data filed at 5/15/2019 1242 Gross per 24 hour Intake 2871.25 ml Output 1033 ml Net 1838.25 ml Hemodynamics:  
PAP:   CO:    
Wedge:   CI:    
CVP:    SVR:    
  PVR:    
 
Ventilator Settings: 
Mode Rate Tidal Volume Pressure FiO2 PEEP Peak airway pressure:     
Minute ventilation:     
 
Physical Exam: 
 
General:  Alert, cooperative, chronically ill Head:  Normocephalic, without obvious abnormality, atraumatic. Eyes:  Conjunctivae/corneas clear. PERRL, EOMs intact. Nose: Nares normal.   
Throat: Lips, mucosa, and tongue normal. Teeth and gums normal.  
Neck: Supple, symmetrical.  Old trach scar Back:   Symmetric, no curvature. ROM normal.  
Lungs:   Clear to auscultation bilaterally. Fair insp effort Chest wall:  No tenderness or deformity. Heart:  Regular rate and rhythm, S1, S2 normal, no murmur, click, rub or gallop. Abdomen:   Soft, non-tender. Protuberant. Flank edema Extremities: Muscle wasting, edema Pulses: 2+ and symmetric all extremities. Skin: Skin color, texture, turgor normal. No rashes or lesions Lymph nodes: Cervical, supraclavicular, and axillary nodes normal.  
Neurologic: quadraplegia Data:  
 
No results found for this or any previous visit (from the past 24 hour(s)). Telemetry:normal sinus rhythm Imaging: 
I have personally reviewed the patients radiographs and have reviewed the reports: 
Clear cxr Total critical care time exclusive of procedures: 35 minutes Cher Olson MD

## 2019-05-15 NOTE — PROGRESS NOTES
Reason for Admission:   Septic shock,pyelonephritis/hydronehrosis,hypotension. quadriplegia RRAT Score:      9 Plan for utilizing home health:   Pt has personal care services through [facility] on Tuesdays and Thursdays to assist with his bowel regimen and bathing. Current Advanced Directive/Advance Care Plan: DNR,AMD on file Likelihood of Readmission: low/green Transition of Care Plan:                   
I met with pt and his wife,Marisela([phone]) to discuss discharge needs. Pt has been a quadreplegic since the 1970s. Per wife,PCP was Dr Pattie Banda but now his PCP is Dr Tasha Rodriguez. Pt is followed by the outpatient Palliative Medicine Team spearheaded by Dr Tasha Rodriguez.  
 
 
Doris Barker

## 2019-05-16 PROBLEM — E87.6 HYPOKALEMIA: Status: ACTIVE | Noted: 2019-01-01

## 2019-05-16 PROBLEM — R13.10 DYSPHAGIA: Status: ACTIVE | Noted: 2019-01-01

## 2019-05-16 PROBLEM — D64.9 ANEMIA: Status: ACTIVE | Noted: 2019-01-01

## 2019-05-16 NOTE — ANESTHESIA PREPROCEDURE EVALUATION
Anesthetic History No history of anesthetic complications Comments: H/o lico, decannulated in 1978, denies airway issues with any anesthetics Review of Systems / Medical History Patient summary reviewed, nursing notes reviewed and pertinent labs reviewed Pulmonary Within defined limits Neuro/Psych Neuromuscular disease Comments: Quadriplegia w/ full motor deficit in all extremities, sensation intact. Able to ventilate without support or O2 Cardiovascular Exercise tolerance: <4 METS Comments: ekg sinus tach GI/Hepatic/Renal 
  
 
 
Renal disease: stones Comments: Worsening Dysphagia, failed MBS receiving NGTube feeds Endo/Other Other Findings Comments: H/o nephrolithiasis; admitted 5/11 with Urosepsis, now afebrile off levophed Denies overnight events Hct 25.5 Physical Exam 
 
Airway Mallampati: III 
TM Distance: 4 - 6 cm Neck ROM: normal range of motion Mouth opening: Normal 
 
Comments: Retrognathic, appears anterior Cardiovascular Rhythm: regular Rate: normal 
 
 
 
 Dental 
 
Dentition: Cristela Men Comments: Upper bridge Pulmonary Breath sounds clear to auscultation Abdominal 
GI exam deferred Other Findings Anesthetic Plan ASA: 3 Anesthesia type: general 
 
 
 
 
Induction: Intravenous Anesthetic plan and risks discussed with: Patient

## 2019-05-16 NOTE — PROGRESS NOTES
[facility] Medical Progress Note NAME: Kirsten Andrade :  1950 MRM:  [id number] Date/Time: 2019  2:11 PM    
 
  
Assessment and Plan:  
 
Severe sepsis / Bacteremia / UTI (urinary tract infection), complicated, due to indwelling arias catheter - POA, urosepsis due to stone and underlying chronic arias. Ur Cx with pan sensitve E coli and proteus, blood cx with 2 species pan sensitive Proteus. continue IV ceftriaxone. Repeat blood cx now NGTD and now off pressors. Nephrolithiasis / Hydronephrosis / Hematuria - POA and underlying source of sepsis. Appreciate urology input, and their plan for cystoscopy and stone removal tomorrow. NPO at midnight. Dysphagia / Quadriplegia / S/P  shunt / Flexion contractures / Sacral wound - POA, severe, chronic, but dysphagia is new or worse than baseline. Appreciate speech. Failed MBS. Now with Dobhoff for feedings. Re-eval Monday, and if fails again with have GI place PEG, per family wishes. Nutrition consult. Continue baclofen, oxybutynin, PPI, laxatives Anemia / Thrombocytopenia - POA and worsened with hydration. Check serologies and monitor. Suspect chronic disease. Chronic myelodysplasia not out of the question Hypokalemia - Replete IV and follow labs. Subjective: Chief Complaint:  Poor speech, weak, no pain ROS: 
(bold if positive, if negative) Tolerating usual minimal PT  Tolerating NGT Diet Objective:  
 
Last 24hrs VS reviewed since prior progress note. Most recent are: 
 
Visit Vitals /74 (BP 1 Location: Right arm, BP Patient Position: At rest) Pulse 77 Temp 97.7 °F (36.5 °C) Resp 19 Ht 6' (1.829 m) Wt 86.1 kg (189 lb 13.1 oz) SpO2 97% BMI 25.74 kg/m² SpO2 Readings from Last 6 Encounters:  
19 97% 19 98% 19 98% 19 98% 19 98% 19 97% Intake/Output Summary (Last 24 hours) at 2019 1411 Last data filed at 5/16/2019 8367 Gross per 24 hour Intake 360 ml Output 875 ml Net -515 ml Physical Exam: 
 
Gen:  Frail, in no acute distress HEENT:  Pink conjunctivae, PERRL, hearing intact to voice, moist mucous membranes Neck:  Supple, without masses, thyroid non-tender Resp:  No accessory muscle use, clear breath sounds without wheezes rales or rhonchi 
Card:  No murmurs, normal S1, S2 without thrills, bruits or peripheral edema Abd:  Soft, non-tender, non-distended, reduced bowel sounds are present, no mass Lymph:  No cervical or inguinal adenopathy Musc:  No cyanosis or clubbing Skin:  No rashes or ulcers, skin turgor is good Neuro:  Cranial nerves are grossly intact, complete motor weakness, follows commands vaguely Psych:  Poor insight, oriented to person, place Telemetry reviewed:   normal sinus rhythm 
__________________________________________________________________ Medications Reviewed: (see below) Medications:  
 
Current Facility-Administered Medications Medication Dose Route Frequency  baclofen (LIORESAL) tablet 2.5 mg  2.5 mg Oral TID  oxybutynin (DITROPAN) tablet 5 mg  5 mg Oral TID  zinc oxide-cod liver oil (DESITIN) 40 % paste   Topical DAILY  white petrolatum-mineral oil (EUCERIN) cream   Topical DAILY  pantoprazole (PROTONIX) 40 mg in sodium chloride 0.9% 10 mL injection  40 mg IntraVENous Q12H  
 bisacodyl (DULCOLAX) suppository 10 mg  10 mg Rectal DAILY PRN  
 cefTRIAXone (ROCEPHIN) 2 g in 0.9% sodium chloride (MBP/ADV) 50 mL  2 g IntraVENous Q24H  
 sodium chloride (NS) flush 5-10 mL  5-10 mL IntraVENous PRN  
 sodium chloride (NS) flush 5-40 mL  5-40 mL IntraVENous Q8H  
 sodium chloride (NS) flush 5-40 mL  5-40 mL IntraVENous PRN  
 acetaminophen (TYLENOL) tablet 650 mg  650 mg Oral Q4H PRN  
 naloxone (NARCAN) injection 0.4 mg  0.4 mg IntraVENous PRN  
 ondansetron (ZOFRAN) injection 4 mg  4 mg IntraVENous Q4H PRN  
  menthol-zinc oxide (CALMOSEPTINE) 0.44-20.6 % ointment 1 Each  1 Each Topical DAILY PRN  
 traMADol (ULTRAM) tablet 75 mg  75 mg Oral Q6H PRN  
 heparin (porcine) injection 5,000 Units  5,000 Units SubCUTAneous Q8H Lab Data Reviewed: (see below) Lab Review:  
 
Recent Labs 05/16/19 
0206 05/14/19 
1113 WBC 4.8 3.6* HGB 8.3* 8.5* HCT 25.1* 25.9*  
* 82* Recent Labs 05/14/19 
8826   
K 3.1*  
* CO2 21 GLU 85 BUN 14  
CREA 0.58* CA 7.5* No results found for: Garima Doyle No results for input(s): PH, PCO2, PO2, HCO3, FIO2 in the last 72 hours. No results for input(s): INR in the last 72 hours. No lab exists for component: INREXT All Micro Results Procedure Component Value Units Date/Time CULTURE, BLOOD [[id number]] Collected:  05/12/19 2022 Order Status:  Completed Specimen:  Blood Updated:  05/16/19 8717 Special Requests: NO SPECIAL REQUESTS Culture result: NO GROWTH 4 DAYS     
 CULTURE, URINE [[id number]]  (Abnormal)  (Susceptibility) Collected:  05/11/19 1137 Order Status:  Completed Specimen:  Urine from [place] Specimen Updated:  05/14/19 1304 Special Requests: NO SPECIAL REQUESTS [place] Count --     
  >100,000 COLONIES/mL Culture result:    
  PROTEUS MIRABILIS (PREDOMINATING) ESCHERICHIA COLI     
 CULTURE, BLOOD, PAIRED [[id number]]  (Abnormal)  (Susceptibility) Collected:  05/11/19 1137 Order Status:  Completed Specimen:  Blood Updated:  05/14/19 1406 Special Requests: NO SPECIAL REQUESTS Culture result:    
  PROTEUS MIRABILIS ISOLATED FROM 3 OF 4 BOTTLES DRAWN. ..CL WHITE AND CL BLUE  
     
      
  PRELIMINARY REPORT OF GRAM NEGATIVE RODS GROWING IN 3 OF 4 BOTTLES DRAWN CALLED TO AND READ BACK BY GIA EKLLEY RN AT [facility] 05.12.2019 LAS  
     
      
  REMAINING BOTTLE(S) HAS/HAVE NO GROWTH SO FAR  
     
 MRSA CULTURE [[id number]] Collected:  05/11/19 8658 Order Status:  Canceled Specimen:  Nares CULTURE, BLOOD [[id number]] Order Status:  Canceled Specimen:  Blood URINE CULTURE HOLD SAMPLE [[id number]] Collected:  05/11/19 1137 Order Status:  Completed Specimen:  Urine from Serum Updated:  05/11/19 1141 Urine culture hold URINE ON HOLD IN MICROBIOLOGY DEPT FOR 3 DAYS. IF UNPRESERVED URINE IS SUBMITTED, IT CANNOT BE USED FOR ADDITIONAL TESTING AFTER 24 HRS, RECOLLECTION WILL BE REQUIRED. Other pertinent lab: none Total time spent with patient: 39 Minutes I reviewed chart, notes, data and current medications in the medical record. I have examined and treated the patient at bedside during this period. Care Plan discussed with: Patient, Family, Care Manager, Nursing Staff, Consultant/Specialist and >50% of time spent in counseling and coordination of care Discussed:  Care Plan and D/C Planning Prophylaxis:  H2B/PPI Disposition:   PT, OT, RN 
        
___________________________________________________ Attending Physician: Charon Ormond, MD

## 2019-05-16 NOTE — PROGRESS NOTES
Bedside and Verbal shift change report given to Omid Dodge (oncoming nurse) by Dariusz Cramer RN (offgoing nurse). Report included the following information SBAR, Kardex, Accordion and Recent Results.

## 2019-05-16 NOTE — PROGRESS NOTES
Problem: Dysphagia (Adult) Goal: *Acute Goals and Plan of Care (Insert Text) Description Swallowing goals initiated 5-13-19: 
1)  Daily re-eval of swallowing until managing secretions better and ready for repeat MBS by 5-17-19 Outcome: Progressing Towards Goal 
 SPEECH LANGUAGE PATHOLOGY DYSPHAGIA TREATMENT Patient: Santo Em (26 y.o. male) Date: 5/16/2019 Diagnosis: Severe sepsis (HCC) [A41.9, R65.20] Severe sepsis ([address] Procedure(s) (LRB): 
CYSTOSCOPY WITH BILATERAL RETROGRADES (Bilateral) Precautions: aspiration ASSESSMENT: 
Patient with slow steady progress. Will repeat MBS today, as urology planning procedure for Friday. Progression toward goals: 
?         Improving appropriately and progressing toward goals ? Improving slowly and progressing toward goals ? Not making progress toward goals and plan of care will be adjusted PLAN: 
Recommendations and Planned Interventions: MBS today If he passes MBS, start diet If he fails, keep dubhoff and repeat MBS next TUESday Patient continues to benefit from skilled intervention to address the above impairments. Continue treatment per established plan of care. Discharge Recommendations:  Home Health SUBJECTIVE:  
Patient's daughter informed SLP that patient would have kidney stone procedure tomorrow. Urology plans to hold patien tfor 24 hours after that, then discharge. He still has dubhoff in due to aspiration on MBS 2 days ago. . 
 
OBJECTIVE:  
Cognitive and Communication Status: 
Neurologic State: Alert Orientation Level: Unable to verbalize Cognition: (able to communicate some with extra time. voice weak and sentences short due to reduced breath support, apraxia too for start of oral-motor) Perception: Appears intact Safety/Judgement: Lack of insight into deficits Dysphagia Treatment: 
Oral Assessment: 
Oral Assessment Oral Hygiene: mildly dry. completed oral care P.O. Trials: Patient Position: upright in bed Vocal quality prior to P.O.:   
Consistency Presented: Thin liquid How Presented: Self-fed/presented;Straw;Spoon(SLP with toothette) ORAL PHaSE:  
Much stronger suck Oral clearance better PHARYNGEAL PHASE:  
Mild-moderate weakness Occasional throat clear. No overt voice change, but again voice is chronically weak Exercises: 
Laryngeal Exercises: 
  
  
  
  
  
  
  
  
  
  
  
  
  
  
  
  
  
  
  
  
  
  
  
  
  
  
  
  
  
  
  
  
  
  
  
  
  
  
  
  
  
  
  
Pain: 
Pain Scale 1: Numeric (0 - 10) Pain Intensity 1: 0 After treatment:  
?              Patient left in no apparent distress sitting up in chair ? Patient left in no apparent distress in bed 
? Call bell left within reach ? Nursing notified ? Caregiver present ? Bed alarm activated COMMUNICATION/EDUCATION:  
Patient was educated regarding His deficit(s) of dysphagia  as this relates to His diagnosis of sepsis. He demonstrated Fair understanding as evidenced by discussion. Daughter understood. . 
 
The patient?s plan of care including recommendations, planned interventions, and recommended diet changes were discussed with: Registered Nurse and Physician. ? Posted safety precautions in patient's room. SUPRIYA Coleman Time Calculation: 10 mins

## 2019-05-16 NOTE — PROGRESS NOTES
Progress Note Patient: Jagdish Yañez MRN: [id number]  SSN: [id number] YOB: 1950  Age: 79 y.o. Sex: male ADMITTED:  2019 to Loi Montana MD  for Severe sepsis ([facility] [A41.9, R65.20] Jagdish Yañez was admitted for Severe sepsis ([facility] [A41.9, R65.20]. On correct abx, medically improved Vitals:  Temp (24hrs), Av.9 °F (36.6 °C), Min:97.3 °F (36.3 °C), Max:98.9 °F (37.2 °C) Blood pressure 130/74, pulse 75, temperature 97.8 °F (36.6 °C), resp. rate 18, height 6' (1.829 m), weight 86.1 kg (189 lb 13.1 oz), SpO2 97 %. I&O's:   1901 -  0700 In: 2531.3 [I.V.:1061.3] Out: 5107 [PZZZR:1778] No intake/output data recorded. Exam:   NAD. urine clear Labs:  
Recent Labs 19 
0206 19 
9982 WBC 4.8 3.6* HGB 8.3* 8.5* HCT 25.1* 25.9*  
* 82* Recent Labs 19 
4001   
K 3.1*  
* CO2 21 GLU 85 BUN 14  
CREA 0.58* CA 7.5* Cultures:   reviewed Imaging:    
 
Assessment:  
 
- Principal Problem: 
  Severe sepsis ([facility] (2019) Active Problems: 
  Quadriplegia ([facility] (2019) Flexion contractures (2019) Sacral wound (2019) Nephrolithiasis (2019) Hepatitis C virus infection cured after antiviral drug therapy (3/10/2019) Hydronephrosis (2019) S/P  shunt (2019) UTI (urinary tract infection) (2019) DNR (do not resuscitate) (2019) Hematuria (2019) Plan:  
 
- cysto, bladder stone removal, B rpg to eval hydro in am 
- continue ivabx for now - Deescalate abx sat or when ok with medicine Signed By: Timoteo Peralta MD - May 16, 2019

## 2019-05-16 NOTE — PROGRESS NOTES
Bedside and Verbal shift change report given to Tiburcio Barrera RN (oncoming nurse) by Ray Duarte RN (offgoing nurse). Report included the following information SBAR, Kardex, Intake/Output, MAR and Recent Results.

## 2019-05-16 NOTE — PROGRESS NOTES
Nutrition Assessment: 
 
RECOMMENDATIONS/INTERVENTION(S):  
1.  TF REC's: Jevity 1.5 at 30ml/hr, increasing to goal rate of 55ml/hr with 160ml h20 flush q4h when IVF d/c. (Goal TF will provide 1980kcals, 84gm pro, 1963ml fluid - this will meet 99% kcal needs and 100% pro needs) 2. When appropriate for PO, follow SLP rec's for diet consistency. 3. Will re-order PO supplements when diet resumes: Ensure Enlive (vanilla) BID, Ensure Pudding (vanilla), and Magic Cup daily. 4. Monitor PO intakes of meals/ONS, weight. ASSESSMENT:  
5/16:  New consult received for TF management. Pt with Dobbhoff with Jevity 1.5 running at goal of 55 mL/hr with flushes. No changes at this time. Monitor weight. Pt had MBS again today which he failed. Will reassess on Monday and discuss PEG placement if appropriate. 5/14: S/P SLP eval and MBS - recommending pt be NPO for 3 days. Plan is to have NGT placed and start TF. Rec's above. 5/13: 78 yo male admitted for sepsis. RD assessment for PU screen. PMhx: quadriplegia. Weight WNL per BMI per age. Daughter present at time of visit, provided all information for pt. States his weight has been stable at home, no change. Pt has a very good appetite at home, they do not use ONS at home because he has never needed them secondary to good PO. Starting 2 days PTA pt was c/o nausea and not wanting to eat much. No longer c/o nausea but the poor PO intake has continued since admission. Pt taking in < 50% meals. RN reports pt coughing and requested SLP consult - ordered and pt made NPO until eval.  Pt states he is willing to try supplements. Multiple wounds present. Labs reviewed. Meds: Vit D, Kcl. Diet Order: Regular 
% Eaten:   
Patient Vitals for the past 72 hrs: 
 % Diet Eaten 05/14/19 1200 0 % 05/14/19 0800 0 % Pertinent Medications: [x] Reviewed Labs: [x] Reviewed Anthropometrics: Height: 6' (182.9 cm) Weight: 86.1 kg (189 lb 13.1 oz) IBW (%IBW):   ( ) UBW (%UBW):   (  %) BMI: Body mass index is 25.74 kg/m². This BMI is indicative of: 
 [] Underweight    [x] Normal    [] Overweight    []  Obesity    []  Extreme Obesity (BMI>40) Estimated Nutrition Needs (Based on): 1988 Kcals/day(BMR 1657 x AF 1.2) , 84 g(84-101gm (1-1.2gm/kg/d)) Protein Carbohydrate: At Least 130 g/day  Fluids: 1988 mL/day (1 ml/kcal) Last BM: 5/15   [x]Active     []Hyperactive  []Hypoactive       [] Absent   BS Skin:    [] Intact   [] Incision  [x] Breakdown - stage II to buttock, wound to sacrum and right knee  [x] DTI - buttocks  [] Tears/Excoriation/Abrasion  []Edema [] Other: Wt Readings from Last 30 Encounters:  
05/15/19 86.1 kg (189 lb 13.1 oz) 02/07/17 68.9 kg (152 lb) NUTRITION DIAGNOSES:  
Problem:  Inadequate energy intake Etiology: related to decreased ability to consume sufficient energy Signs/Symptoms: as evidenced by PO intakes < EEN's since admission NUTRITION INTERVENTIONS: 
Meals/Snacks: General/healthful diet   Supplements: Commercial supplement GOAL:  
Consume > 75% meals + ONS within next 1-3 days Cultural, Jainism, or Ethnic Dietary Needs: None EDUCATION & DISCHARGE NEEDS:  
 [x] None Identified 
 [] Identified and Education Provided/Documented 
 [] Identified and Pt declined/was not appropriate [x] Interdisciplinary Care Plan Reviewed/Documented  
 [x] Discharge Needs:    Continue diet per SLP rec's at home with ONS 2-3x/day if intakes remain < 50% [] No Nutrition Related Discharge Needs NUTRITION RISK:  
Pt Is At Nutrition Risk  [] No Nutrition Risk Identified  [] PT SEEN FOR:  
 []  MD Consult: []Calorie Count []Diabetic Diet Education []Diet Education []Electrolyte Management []General Nutrition Management and Supplements []Management of Tube Feeding []TPN Recommendations []  RN Referral:  []MST score >=2 
   []Enteral/Parenteral Nutrition PTA []Pregnant: Gestational DM or Multigestation  
              [] Pressure Ulcer 
 
[]  Low BMI      []  Length of Stay       [] Dysphagia Diet         [] Ventilator [x]  Follow-up Previous Recommendations: 
 [x] Implemented          [] Not Implemented          [] Not Applicable Previous Goal: 
 [x] Met              [] Progressing Towards Goal              [] Not Progressing Towards Goal   [] Not Applicable Adonis Zamora, SHRUTHI Pager [phone] Phone [phone]

## 2019-05-16 NOTE — PROGRESS NOTES
Dr. Jessica Proctor informed patient of procedure planned for tomorrow AM. RN later informed daughter about pending urology procedure for tomorrow AM.

## 2019-05-16 NOTE — PROGRESS NOTES
VAT Note - To Acknowledge order for Midline catheter placement for limited vessels for access. Chart reviewed for Midline placement evaluation. Areas reviewed include, but are not limited to, patient's current and past H&P, Lab and Procedure Results, all notes, media records and medications. Examined patient who has flexion contracture of both arms, L severe and cannot open arm to assess for veins suitable for midline. R Arm less severe and can with assistance open R arm and could potentially place midline in a vein there. R Hand is contracted and very swollen as well as forearm. Discussed plan for Midline and for expected procedure tomorrow AM and it was decided to use TLC inserted 5 days ago in R IJ until after procedure tomorrow. If midline is placed and does not give blood return for long then patient will need IV sticks for lab and upon assessment between his immobility, contractures and swelling, no easily apparent vessels for peripheral stick. Plan to follow chart and assess pt for Midline tomorrow after OR procedure completed. Discussed with pt, family and nurse.

## 2019-05-16 NOTE — PROGRESS NOTES
SPEECH PATHOLOGY MODIFIED BARIUM SWALLOW STUDY Patient: Dory Mendes (30 y.o. male) Date: 5/16/2019 Primary Diagnosis: Severe sepsis ([facility] [address] [A41.9, R65.20] Procedure(s) (LRB): 
CYSTOSCOPY WITH BILATERAL RETROGRADES (Bilateral) Precautions: aspiration ASSESSMENT : 
Based on the objective data described below, the patient presents with slight improvement in swallow since  last MBS, but NOT enough to prevent aspiration. He has aspiration in mild-moderate amounts due to delay in swallow and weakness. DUe to weakness, he has residue after the swallow, which he aspirated posterioroly and silently. Cough is poor in strength. Due to persistent moderate amounts of silent aspiration with weak cough, prognosis for quick swallow recovery is poor. . 
 
Patient will benefit from skilled intervention to address the above impairments. Patient?s rehabilitation potential is considered to be Guarded Factors which may influence rehabilitation potential include:  
? None noted ? Mental ability/status ? Medical condition ? Home/family situation and support systems ? Safety awareness ? Pain tolerance/management ? Other: PLAN : 
Recommendations and Planned Interventions: NPO Consider PEG. Talked with MD and daughter. Frequency/Duration: Patient will be followed by speech-language pathology 5 times a week to address goals. Discharge Recommendations: Home Health SUBJECTIVE:  
Patient upset he failed MBS. ?. 
 
OBJECTIVE:  
 
Past Medical History:  
Diagnosis Date Disorder of brainstem Bruised brainstem Double vision Frequent UTI Kidney stones Quadriplegia ([facility] [address] Past Surgical History:  
Procedure Laterality Date HX CSF SHUNT    
 HX CSF SHUNT    
 HX ORTHOPAEDIC Left HX OTHER SURGICAL    
 bladder stone Prior Level of Function/Home Situation:  
Home Situation Home Environment: Private residence # Steps to Enter: 0 One/Two Story Residence: One story Living Alone: No 
Support Systems: Family member(s) Patient Expects to be Discharged to[de-identified] Private residence Current DME Used/Available at Home: Wheelchair, power, Shower chair, Other (comment)(ceiling lift ) Diet prior to admission: soft, thisn Current Diet:  NPO with dubhoff TF Radiologist: Sarita Mishra Film Views: Lateral 
Patient Position: upright in Hausted chair Trial 1: Trial 2:  
Consistency Presented: Thin liquid; Nectar thick liquid How Presented: Self-fed/presented;Straw ORAL PHASE:    
Bolus Acceptance: No impairment(better suck) Bolus Formation/Control: (oral control issues noted): (oral control) material fell into pharynx without control.   :    
Propulsion: No impairment Oral Residue: None PHARYNGEAL PHASE:     
Initiation of Swallow: Triggered at pyriform sinus(es) with thins and nectars Timing: Pooling 1-5 sec Penetration: (mild-moderate deep amounts of penetration, brooke from pyriforms) with thins and nectars Aspiration/Timing: Silent ;Trace;Repeated;During; After(due to delay and residue, brooke in pyriforms) with thins and nectars Pharyngeal Clearance: Vallecular residue;Pyriform residue (10% with thins, more with necdtars) Attempted Modifications: Double swallow Trial 3: Trial 4:  
     
     
     
     
 :    :    
     
     
    
     
     
     
     
     
     
     
     
 
Decreased Tongue Base Retraction?: Yes(miold-moderate) Laryngeal Elevation: Reduced excursion with laryngeal vestibule gap Aspiration/Penetration Score: 8 (Aspiration-Contrast passes cords/glottis with no effort to eject, ie/silent aspiration) NOMS:  
The NOMS functional outcome measure was used to quantify this patient's level of swallowing impairment. Based on the NOMS, the patient was determined to be at level 1 for swallow function NOMS Swallowing Levels: 
Level 1 (CN): NPO Level 2 (CM): NPO but takes consistency in therapy Level 3 (CL): Takes less than 50% of nutrition p.o. and continues with nonoral feedings; and/or safe with mod cues; and/or max diet restriction Level 4 (CK): Safe swallow but needs mod cues; and/or mod diet restriction; and/or still requires some nonoral feeding/supplements Level 5 (CJ): Safe swallow with min diet restriction; and/or needs min cues Level 6 (CI): Independent with p.o.; rare cues; usually self cues; may need to avoid some foods or needs extra time Level 7 (CH): Independent for all p.o. KASSANDRA. (2003). National Outcomes Measurement System (NOMS): Adult Speech-Language Pathology User's Guide. COMMUNICATION/EDUCATION:  
Patient was educated regarding His deficit(s) of dysphagia  as this relates to His diagnosis of sepsis. He demonstrated Guarded understanding as evidenced by discussion. Discussed his guarded prognosis for quick recovery with swallow. . 
 
The patient?s plan of care including findings from MBS, recommendations, planned interventions, and recommended diet changes were discussed with: Registered Nurse and Physician. ? Posted safety precautions in patient's room. ? Patient/family have participated as able in goal setting and plan of care. ?  Patient/family agree to work toward stated goals and plan of care. ?  Patient understands intent and goals of therapy, but is neutral about his/her participation. ? Patient is unable to participate in goal setting and plan of care. Thank you for this referral. 
Cassie Barrios, SLP Time Calculation: 25 mins

## 2019-05-16 NOTE — ROUTINE PROCESS
Interdisciplinary team rounds were held 5/16/2019 with the following team members:Care Management, Nursing, Pharmacy, Physical Therapy and Physician. Plan of care discussed. See clinical pathway and/or care plan for interventions and desired outcomes. Plan: procedure tomorrow. Failed speech study today. Repeat speech study Monday.

## 2019-05-17 NOTE — ANESTHESIA POSTPROCEDURE EVALUATION
Procedure(s): 
CYSTOSCOPY WITH  BILATERAL RETROGRADES; Lithalopaxy;bladder biopsy. general 
 
Anesthesia Post Evaluation Multimodal analgesia: multimodal analgesia not used between 6 hours prior to anesthesia start to PACU discharge Patient location during evaluation: PACU Patient participation: complete - patient participated Level of consciousness: awake Pain management: adequate Airway patency: patent Anesthetic complications: no 
Cardiovascular status: acceptable and hemodynamically stable Respiratory status: acceptable Hydration status: acceptable Post anesthesia nausea and vomiting:  controlled Vitals Value Taken Time /56 5/17/2019 10:30 AM  
Temp 36.5 °C (97.7 °F) 5/17/2019 10:11 AM  
Pulse 79 5/17/2019 10:32 AM  
Resp 14 5/17/2019 10:32 AM  
SpO2 94 % 5/17/2019 10:32 AM  
Vitals shown include unvalidated device data.

## 2019-05-17 NOTE — TELEPHONE ENCOUNTER
Outgoing call placed to patient's nurse, Mildred Dhaliwal - she reported that patient had procedure this morning and tolerated well.   Requested for Palliative consult- which she will put in today    Left message for TRACY Telles to return call

## 2019-05-17 NOTE — PROGRESS NOTES
VAT Note - Following chart for midline placement after surgical procedure today. Please call [phone] for questions or concerns.

## 2019-05-17 NOTE — PROGRESS NOTES
Bedside and Verbal shift change report given to Davy Martinez RN (oncoming nurse) by Edie Yancey RN (offgoing nurse). Report included the following information SBAR, Kardex, ED Summary and Recent Results. 2230 - CHG bath given. Full linen change complete. 0000 - Tube feedings off due to NPO for cystoscopy in the morning. 5385 - 2nd CHG bath given. 0630 - Report given to pre-op nurse. 0700 - Patient transported to pre-op Bedside and Verbal shift change report given to Ash Palafox (oncoming nurse) by Davy Martinez RN (offgoing nurse). Report included the following information SBAR, Kardex, ED Summary and Recent Results.

## 2019-05-17 NOTE — PROGRESS NOTES
MIDLINE PLACEMENT NOTE Midline catheters are NOT central lines. Approved midline products are peripheral infusion devices with the tip terminating in the arm at or below the axillary vein, distal to the shoulder. The tip DOES NOT ENTER THE CENTRAL VASCULATURE. A Midline is for reliable short term (less than 30 days) vascular access. PRE-PROCEDURE VERIFICATION Correct Procedure: yes Correct Site:  yes Temperature: Temp: 97.9 °F (36.6 °C), Temperature Source: Temp Source: Axillary Recent Labs 05/17/19 
0255 BUN 9  
CREA 0.44* * WBC 6.8 Allergies: Patient has no known allergies. Education materials for Midline Care given: yes. See Patient Education activity for further details. Midline Booklet placed at bedside: yes Midline Catheter Maintenance: For complete information reference Policy # [id number] A. Dressing Changes 1. Assess the dressing in the first 24 hours for accumulation of blood, fluid or moisture beneath the dressing. During dressing changes, assess the external length of the catheter to determine if migration of the catheter has occurred. Periodically confirm catheter placement, tip location, patency and security of dressing. Dressing changes should be done every 7 days, and PRN using sterile technique if integrity of dressing is compromised. Apply new dressing per hospital policy. Caution: Do not use scissors to remove dressing to minimize the risk of cutting the  Catheter. B. Flushing 1. Flush each lumen of the catheter with 10 mL of sterile saline every 12 hours or after each use. In addition, lock each lumen of the catheter with sterile saline. Note: Flush with 20 mL of sterile saline after blood therapy Caution: DO NOT USE A SYRINGE SMALLER THAN 10 mL TO FLUSH AND CONFIRM PATENCY. Patency should be assessed with a 10 mL or larger syringe and  sterile saline.   Upon confirmation of patency, administration of medication should be  given in a syringe appropriately sized for the dose. Do not infuse against resistance. C. Blood Draws: 1. Stop infusion, draw off and waste 10 ml of blood. Draw sample with 10 mL syringe or          greater. DO NOT USE VACUTAINER . Transfer with appropriate device to lab tubes. Flush        with 20 ml NS. 
D. Occluded or Partially Occluded Catheter 1. Catheters that present resistance to flushing and aspiration may be partially or completely  occluded. Do not flush against resistance. PROCEDURE DETAIL: 
 
Verbal consent was obtained and all questions were answered related to risks and benefits. A Midline was inserted as a sterile procedure using ultrasound and Modified Seldinger Technique for vascular access. The following documentation is in addition to the Midline properties in the lines/airways Doc Flow Sheet: Lot #: [id number] Lidocaine 1% administered intradermally :yes Internal Catheter Total Length: 11 (cm)   External catheter length: 0 (cm) Vein Selection for Midline:right basilic Sterile CVC Insertion Bundle was used to place line yes Complication Related to Insertion:no Prior to use, line patency MUST be verified by flushing at least a 10 mL syringe. Line should flush easily without resistance, and withdrawal of brisk blood return to verify patency. Line is okay to use: yes        (Remove Midline by:  06/15/19) Richard Corrigan RN

## 2019-05-17 NOTE — BRIEF OP NOTE
BRIEF OPERATIVE NOTE Date of Procedure: 5/17/2019 Preoperative Diagnosis: SEPSIS  
URINARY RETENTION Postoperative Diagnosis: SEPSIS  
URINARY RETENTION Procedure(s): 
CYSTOSCOPY WITH  BILATERAL RETROGRADES; Lithalopaxy;bladder biopsy Surgeon(s) and Role: 
   Ana M Robbins MD - Primary Surgical Assistant: staff Surgical Staff: 
Circ-1: Casandra Love RN Scrub Tech-1: Maritza Samson. Event Time In Time Out Incision Start 05/17/2019 5566 Incision Close 05/17/2019 0955 Anesthesia: General  
Estimated Blood Loss: minimal <5cc Specimens:  
ID Type Source Tests Collected by Time Destination 1 : bladder biopsy posterior bladder Preservative Bladder  Reyna Mendiola MD 5/17/2019 0890 Pathology Findings: small bladder stones - likely from previous catheter encrustation, edematous bladder mucosa, ureteral orifices patent - no evidence of ureteral stricture or obstruction, bilateral ureterohydronephrosis R>L, prompt drainage of both collecting systems. Complications: none Implants: * No implants in log *

## 2019-05-17 NOTE — PERIOP NOTES
TRANSFER - OUT REPORT: 
 
Verbal report given to Jona Mccoy RN (name) on Jony Mojica  being transferred to 536(unit) for routine post - op Report consisted of patients Situation, Background, Assessment and  
Recommendations(SBAR). Information from the following report(s) Procedure Summary and Intake/Output was reviewed with the receiving nurse. Lines:  
Triple Lumen central line 05/11/19 Right Internal jugular (Active) Central Line Being Utilized Yes 5/17/2019 10:41 AM  
Criteria for Appropriate Use Limited/no vessel suitable for conventional peripheral access 5/17/2019 10:41 AM  
Site Assessment Clean, dry, & intact 5/17/2019 10:41 AM  
Infiltration Assessment 0 5/17/2019  7:30 AM  
Affected Extremity/Extremities Color distal to insertion site pink (or appropriate for race) 5/17/2019  3:54 AM  
Date of Last Dressing Change 05/15/19 5/16/2019  4:00 PM  
Dressing Status Clean, dry, & intact 5/17/2019 10:41 AM  
Dressing Type Disk with Chlorhexadine gluconate (CHG) 5/17/2019  7:30 AM  
Action Taken Open ports on tubing capped 5/16/2019  4:00 PM  
Proximal Hub Color/Line Status White;Flushed 5/17/2019  7:30 AM  
Positive Blood Return (Medial Site) Yes 5/17/2019  3:54 AM  
Medial Hub Color/Line Status Blue 5/17/2019 10:41 AM  
Positive Blood Return (Lateral Site) No 5/16/2019  4:00 PM  
Distal Hub Color/Line Status Brown;Flushed 5/17/2019  7:30 AM  
Positive Blood Return (Site #3) Yes 5/17/2019  3:54 AM  
Alcohol Cap Used Yes 5/17/2019 10:41 AM  
  
 
Opportunity for questions and clarification was provided. Patient transported with: 
 O2 @ 2 liters Registered Nurse Tech

## 2019-05-17 NOTE — PROGRESS NOTES
[facility] Medical Progress Note NAME: Cassandra Medina :  1950 MRM:  [id number] Date/Time: 2019  2:11 PM    
 
  
Assessment and Plan:  
 
Severe sepsis / Bacteremia / UTI (urinary tract infection), complicated, due to indwelling arias catheter - POA, urosepsis due to stone and underlying chronic arias. Ur Cx with pan sensitve E coli and proteus, blood cx with 2 species pan sensitive Proteus. Continue IV ceftriaxone, complete prior to discharge. Repeat blood cx now NGTD and now off pressors. Nephrolithiasis / Hydronephrosis / Hematuria - POA and underlying source of sepsis. Appreciate urology input, and did cystoscopy with bilateral stents and stone removal this AM. Dysphagia / Quadriplegia / S/P  shunt / Flexion contractures / Sacral wound - POA, severe, chronic, but dysphagia is new or worse than baseline. Appreciate speech. Failed MBS. Now with Dobhoff for feedings. Re-eval Monday, and if fails again with have GI place PEG, per family wishes. Nutrition consult. Continue baclofen, oxybutynin, PPI, laxatives Anemia / Thrombocytopenia - POA and worsened with hydration. Check serologies and monitor. Suspect chronic disease. Chronic myelodysplasia not out of the question Hypokalemia - Replete IV and follow labs. Subjective: Chief Complaint:  Poor speech, weak, no pain, tolerated uroscophy ROS: 
(bold if positive, if negative) Tolerating usual minimal PT  Tolerating NGT Diet Objective:  
 
Last 24hrs VS reviewed since prior progress note. Most recent are: 
 
Visit Vitals /77 (BP 1 Location: Right arm, BP Patient Position: At rest) Pulse 74 Temp 97.9 °F (36.6 °C) Resp 16 Ht 6' (1.829 m) Wt 86.1 kg (189 lb 13.1 oz) SpO2 96% BMI 25.74 kg/m² SpO2 Readings from Last 6 Encounters:  
19 96% 05/10/19 97% 19 98% 19 98% 19 98% 19 98% O2 Flow Rate (L/min): 2 l/min Intake/Output Summary (Last 24 hours) at 5/17/2019 1407 Last data filed at 5/17/2019 1041 Gross per 24 hour Intake 1050 ml Output 2000 ml Net -950 ml Physical Exam: 
 
Gen:  Frail, in no acute distress HEENT:  Pink conjunctivae, PERRL, hearing intact to voice, moist mucous membranes Neck:  Supple, without masses, thyroid non-tender Resp:  No accessory muscle use, clear breath sounds without wheezes rales or rhonchi 
Card:  No murmurs, normal S1, S2 without thrills, bruits or peripheral edema Abd:  Soft, non-tender, non-distended, reduced bowel sounds are present, no mass Lymph:  No cervical or inguinal adenopathy Musc:  No cyanosis or clubbing Skin:  No rashes or ulcers, skin turgor is good Neuro:  Cranial nerves are grossly intact, complete motor weakness, follows commands vaguely Psych:  Poor insight, oriented to person, place Telemetry reviewed:   normal sinus rhythm 
__________________________________________________________________ Medications Reviewed: (see below) Medications:  
 
Current Facility-Administered Medications Medication Dose Route Frequency  potassium chloride 10 mEq in 100 ml IVPB  10 mEq IntraVENous Q1H  
 baclofen (LIORESAL) tablet 2.5 mg  2.5 mg Oral TID  oxybutynin (DITROPAN) tablet 5 mg  5 mg Oral TID  zinc oxide-cod liver oil (DESITIN) 40 % paste   Topical DAILY  white petrolatum-mineral oil (EUCERIN) cream   Topical DAILY  pantoprazole (PROTONIX) 40 mg in sodium chloride 0.9% 10 mL injection  40 mg IntraVENous Q12H  
 bisacodyl (DULCOLAX) suppository 10 mg  10 mg Rectal DAILY PRN  
 cefTRIAXone (ROCEPHIN) 2 g in 0.9% sodium chloride (MBP/ADV) 50 mL  2 g IntraVENous Q24H  
 sodium chloride (NS) flush 5-10 mL  5-10 mL IntraVENous PRN  
 sodium chloride (NS) flush 5-40 mL  5-40 mL IntraVENous Q8H  
 sodium chloride (NS) flush 5-40 mL  5-40 mL IntraVENous PRN  
 acetaminophen (TYLENOL) tablet 650 mg  650 mg Oral Q4H PRN  
  naloxone (NARCAN) injection 0.4 mg  0.4 mg IntraVENous PRN  
 ondansetron (ZOFRAN) injection 4 mg  4 mg IntraVENous Q4H PRN  
 menthol-zinc oxide (CALMOSEPTINE) 0.44-20.6 % ointment 1 Each  1 Each Topical DAILY PRN  
 traMADol (ULTRAM) tablet 75 mg  75 mg Oral Q6H PRN  
 heparin (porcine) injection 5,000 Units  5,000 Units SubCUTAneous Q8H Lab Data Reviewed: (see below) Lab Review:  
 
Recent Labs 05/17/19 0255 05/16/19 
0206 WBC 6.8 4.8 HGB 8.4* 8.3* HCT 25.5* 25.1*  
* 114* Recent Labs 05/17/19 0255   
K 3.1*  
* CO2 29 GLU 89 BUN 9  
CREA 0.44* CA 7.5* MG 1.9 PHOS 2.2* ALB 1.9* TBILI 0.4 SGOT 29 ALT 50 No results found for: Radha Hillman No results for input(s): PH, PCO2, PO2, HCO3, FIO2 in the last 72 hours. No results for input(s): INR in the last 72 hours. No lab exists for component: INREXT, INREXT All Micro Results Procedure Component Value Units Date/Time CULTURE, BLOOD, PAIRED [[id number]]  (Abnormal)  (Susceptibility) Collected:  05/11/19 1137 Order Status:  Completed Specimen:  Blood Updated:  05/17/19 0755 Special Requests: NO SPECIAL REQUESTS Culture result:    
  PROTEUS MIRABILIS ISOLATED FROM 3 OF 4 BOTTLES DRAWN. ..CL WHITE AND CL BLUE  
     
      
  PRELIMINARY REPORT OF GRAM NEGATIVE RODS GROWING IN 3 OF 4 BOTTLES DRAWN CALLED TO AND READ BACK BY GIA KELLEY RN AT 3528 05.12.2019 LAS  
     
      
  REMAINING BOTTLE(S) HAS/HAVE NO GROWTH IN 5 DAYS  
     
 CULTURE, BLOOD [[id number]] Collected:  05/12/19 2944 Order Status:  Completed Specimen:  Blood Updated:  05/17/19 9975 Special Requests: NO SPECIAL REQUESTS Culture result: NO GROWTH 5 DAYS     
 CULTURE, URINE [[id number]]  (Abnormal)  (Susceptibility) Collected:  05/11/19 1137 Order Status:  Completed Specimen:  Urine from [place] Specimen Updated:  05/14/19 1304 Special Requests: NO SPECIAL REQUESTS [place] Count --     
  >100,000 COLONIES/mL Culture result:    
  PROTEUS MIRABILIS (PREDOMINATING) ESCHERICHIA COLI     
 MRSA CULTURE [[id number]] Collected:  05/11/19 1845 Order Status:  Canceled Specimen:  [place] CULTURE, BLOOD [[id number]] Order Status:  Canceled Specimen:  Blood URINE CULTURE HOLD SAMPLE [[id number]] Collected:  05/11/19 1137 Order Status:  Completed Specimen:  Urine from Serum Updated:  05/11/19 1141 Urine culture hold URINE ON HOLD IN MICROBIOLOGY DEPT FOR 3 DAYS. IF UNPRESERVED URINE IS SUBMITTED, IT CANNOT BE USED FOR ADDITIONAL TESTING AFTER 24 HRS, RECOLLECTION WILL BE REQUIRED. Other pertinent lab: none Total time spent with patient: 39 Minutes I reviewed chart, notes, data and current medications in the medical record. I have examined and treated the patient at bedside during this period. Care Plan discussed with: Patient, Family, Care Manager, Nursing Staff, Consultant/Specialist and >50% of time spent in counseling and coordination of care Discussed:  Care Plan and D/C Planning Prophylaxis:  H2B/PPI Disposition:   PT, OT, RN 
        
___________________________________________________ Attending Physician: Bernabe Tinoco MD

## 2019-05-17 NOTE — CONSULTS
Palliative Medicine Consult Wilson: [phone] ([id number]) Patient Name: Leonardo Mishra YOB: 1950 Date of Initial Consult: 05/17/2019 Reason for Consult: Care decisions Requesting Provider: Cynthia Stratton MD  
Primary Care Physician: Gerardo Hernandez NP 
 
 SUMMARY:  
Leonardo Mishra is a 79 y.o. with a past history of brainstem injury, quadriplegia with chronic arias,  shunt, kidney stones, and frequent UTI, who was admitted on 5/11/2019 from home with a diagnosis of severe sepsis. Patient presented to the ED with complaints of fever, hematuria, and penile pain. ED eval revealed fever, hypotension,leukocytosis and lactic acidosis. CT scan revealed bilateral hydronephrosis and hydroureter and thickened urinary bladder wall with small stones in the bladder. Patient admitted and started on broad spectrum antibiotics and vasopressor support. With treatment of sepsis, vasopressor support weaned off and patient stabilized. Urology consulted patient underwent cystoscopy with bilateral retrograde pyelogram and bladder biopsy this am. This showed small bladder stones - likely from previous catheter encrustation, edematous bladder mucosa, ureteral orifices patent - no evidence of ureteral stricture or obstruction, bilateral ureterohydronephrosis R>L, prompt drainage of both collecting systems. Speech eval done and patient found to have aspiration in mild-moderate amounts due to delay in swallow and weakness. Due to weakness, he has residue after the swallow, which he aspirated posteriorly and silently. Cough is poor in strength. Due to persistent moderate amounts of silent aspiration with weak cough, prognosis for quick swallow recovery is poor. Current medical issues leading to Palliative Medicine involvement include: Care decisions. SH: , daughter lives with them. Sister lives in [place] and brother lives in [place]. PALLIATIVE DIAGNOSES:  
1. Dysphagia 2. Physical debility 3. Goals of care 4. ACP/DNR review PLAN:  
1. Met with patient, daughter Valerie and patient's sister and introduced the role of Palliative Medicine. Patient is followed by Samra Young NP). 2. Patient just had midline catheter placed. IS awake and alert resting in bed, Engaging but somewhat difficult to understand at times. Family can understand it Can tell me he is not having any pain or shortness of breath. 3. Goals of care-- Discussed current hospitalization and prior level of functioning. Valerie reports that patient has a care aid that comes in daily to assist in his care needs, and family assists as well. Has all necessary DME in the home (wheelchair, elieser lift). Reviewed speech therapy eval and 2 MBS and Valerie understands that dysphagia/aspiration risk could be due to weakness from recent infection/sepsis and could improve, she also understands that given his chronic comorbidities it could also be his new baseline level of functioning. Plan to continue Dobbhoff tube feeds until next week when the MBS is to be repeated (Monday or Tuesday). Patient is hopeful that he will have some improvement with more time (MBS were repeated only 2 days- initial performed on the 14th, follow-up on the 16tth- and second one did show slight improvement in swallow but not enough to prevent aspiration), states he \"really loves to eat\". Valerie reports that plan would be to pursue PEG if next MBS is not improved. Asked patient what his thoughts were on that and he said \"I don't know\". Plan to follow-up with patient and family on Monday to further address. 4. ACP--> Patient has AMD on file that appoints daughterValerie, as sole agent for healthcare decisions. Patient also has completed DDNR on file. 5. Initial consult note routed to primary continuity provider and/or primary health care team members 6.  Communicated plan of care with: Palliative IDTAyanna 192 Team 
 
 GOALS OF CARE / TREATMENT PREFERENCES:  
 
GOALS OF CARE: 
Patient/Health Care Proxy Stated Goals: (Continue tube feedings through the weekend and undergo repeat MBS early next week. Based on results, considering PEG) TREATMENT PREFERENCES:  
Code Status: DNR Advance Care Planning: 
[x] The [facility] Interdisciplinary Team has updated the ACP Navigator with [place] and Patient Capacity Primary Decision Maker: Skylar Burnette - [phone] Advance Care Planning 5/11/2019 Patient's Healthcare Decision Maker is: Named in scanned ACP document Confirm Advance Directive Yes, on file Does the patient have other document types Power of [place]; Do Not Resuscitate Medical Interventions: Limited additional interventions Other Instructions: Other: As far as possible, the palliative care team has discussed with patient / health care proxy about goals of care / treatment preferences for patient. HISTORY:  
 
History obtained from: chart, daughter CHIEF COMPLAINT: fever, hematuria, and penile pain HPI/SUBJECTIVE: The patient is:  
[] Verbal and participatory [x] Non-participatory due to:  Medical condition/brainstem injury Patient presented to the ED with complaints of fever, hematuria, and penile pain. ED eval revealed fever, hypotension,leukocytosis and lactic acidosis. CT scan revealed bilateral hydronephrosis and hydroureter and thickened urinary bladder wall with small stones in the bladder. Patient admitted and started on broad spectrum antibiotics and vasopressor support. With treatment of sepsis, vasopressor support weaned off and patient stabilized. Clinical Pain Assessment (nonverbal scale for severity on nonverbal patients):  
Clinical Pain Assessment Severity: 0 Activity (Movement): Lying quietly, normal position Duration: for how long has pt been experiencing pain (e.g., 2 days, 1 month, years) Frequency: how often pain is an issue (e.g., several times per day, once every few days, constant) FUNCTIONAL ASSESSMENT:  
 
Palliative Performance Scale (PPS): PPS: 30 
 
 
 PSYCHOSOCIAL/SPIRITUAL SCREENING:  
 
Palliative IDT has assessed this patient for cultural preferences / practices and a referral made as appropriate to needs (Cultural Services, Patient Advocacy, Ethics, etc.) Any spiritual / Uatsdin concerns: 
[] Yes /  [x] No 
 
Caregiver Burnout: 
[] Yes /  [x] No /  [] No Caregiver Present Anticipatory grief assessment:  
[x] Normal  / [] Maladaptive ESAS Anxiety: Anxiety: 0 
 
ESAS Depression:    
 
 
 REVIEW OF SYSTEMS:  
 
Positive and pertinent negative findings in ROS are noted above in HPI. The following systems were [x] reviewed / [] unable to be reviewed as noted in HPI Other findings are noted below. Systems: constitutional, ears/nose/mouth/throat, respiratory, gastrointestinal, genitourinary, musculoskeletal, integumentary, neurologic, psychiatric, endocrine. Positive findings noted below. Modified ESAS Completed by: provider Fatigue: 0 Drowsiness: 0 Pain: 0 Anxiety: 0 Nausea: 0 Dyspnea: 0 Constipation: No  
  Stool Occurrence(s): 1 PHYSICAL EXAM:  
 
From RN flowsheet: 
Wt Readings from Last 3 Encounters:  
05/15/19 189 lb 13.1 oz (86.1 kg) 02/07/17 152 lb (68.9 kg) Blood pressure 116/58, pulse 87, temperature 98 °F (36.7 °C), resp. rate 16, height 6' (1.829 m), weight 189 lb 13.1 oz (86.1 kg), SpO2 100 %. Pain Scale 1: Numeric (0 - 10) Pain Intensity 1: 0 Last bowel movement, if known:  
 
Constitutional: NAD Eyes: pupils equal, anicteric ENMT: no nasal discharge, moist mucous membranes, dobhoff in place Cardiovascular: regular rhythm, distal pulses intact Respiratory: breathing not labored, symmetric Gastrointestinal: soft non-tender, +bowel sounds Musculoskeletal: no tenderness to palpation Skin: warm, dry Neurologic: following commands Psychiatric: full affect, no hallucinations Other: 
 
 
 HISTORY:  
 
Principal Problem: 
  Severe sepsis ([address] (5/11/2019) Active Problems: 
  Quadriplegia ([address] (2/4/2019) Flexion contractures (2/4/2019) Sacral wound (2/4/2019) Nephrolithiasis (2/4/2019) Hepatitis C virus infection cured after antiviral drug therapy (3/10/2019) Hydronephrosis (5/11/2019) S/P  shunt (5/11/2019) UTI (urinary tract infection) (5/11/2019) DNR (do not resuscitate) (5/11/2019) Hematuria (5/11/2019) Anemia (5/16/2019) Dysphagia (5/16/2019) Hypokalemia (5/16/2019) Goals of care, counseling/discussion () 
 
  ACP (advance care planning) () Past Medical History:  
Diagnosis Date  Disorder of brainstem Bruised brainstem  Double vision  Frequent UTI  History of vascular access device 05/17/2019 [facility] 4Fr Midline 00JZ Right Basilic for poor access  Kidney stones  Quadriplegia ([address] Past Surgical History:  
Procedure Laterality Date  HX CSF SHUNT  HX CSF SHUNT  HX ORTHOPAEDIC Left  HX OTHER SURGICAL    
 bladder stone History reviewed. No pertinent family history. History reviewed, no pertinent family history. Social History Tobacco Use  Smoking status: Former Smoker  Smokeless tobacco: Never Used Substance Use Topics  Alcohol use: No  
 
No Known Allergies Current Facility-Administered Medications Medication Dose Route Frequency  potassium chloride 10 mEq in 100 ml IVPB  10 mEq IntraVENous Q1H  
 baclofen (LIORESAL) tablet 2.5 mg  2.5 mg Oral TID  oxybutynin (DITROPAN) tablet 5 mg  5 mg Oral TID  zinc oxide-cod liver oil (DESITIN) 40 % paste   Topical DAILY  white petrolatum-mineral oil (EUCERIN) cream   Topical DAILY  pantoprazole (PROTONIX) 40 mg in sodium chloride 0.9% 10 mL injection  40 mg IntraVENous Q12H  bisacodyl (DULCOLAX) suppository 10 mg  10 mg Rectal DAILY PRN  
 cefTRIAXone (ROCEPHIN) 2 g in 0.9% sodium chloride (MBP/ADV) 50 mL  2 g IntraVENous Q24H  
 sodium chloride (NS) flush 5-10 mL  5-10 mL IntraVENous PRN  
 sodium chloride (NS) flush 5-40 mL  5-40 mL IntraVENous Q8H  
 sodium chloride (NS) flush 5-40 mL  5-40 mL IntraVENous PRN  
 acetaminophen (TYLENOL) tablet 650 mg  650 mg Oral Q4H PRN  
 naloxone (NARCAN) injection 0.4 mg  0.4 mg IntraVENous PRN  
 ondansetron (ZOFRAN) injection 4 mg  4 mg IntraVENous Q4H PRN  
 menthol-zinc oxide (CALMOSEPTINE) 0.44-20.6 % ointment 1 Each  1 Each Topical DAILY PRN  
 traMADol (ULTRAM) tablet 75 mg  75 mg Oral Q6H PRN  
 heparin (porcine) injection 5,000 Units  5,000 Units SubCUTAneous Q8H  
 
 
 
 LAB AND IMAGING FINDINGS:  
 
Lab Results Component Value Date/Time WBC 6.8 05/17/2019 02:55 AM  
 HGB 8.4 (L) 05/17/2019 02:55 AM  
 PLATELET 123 (L) 40/92/2782 02:55 AM  
 
Lab Results Component Value Date/Time Sodium 140 05/17/2019 02:55 AM  
 Potassium 3.1 (L) 05/17/2019 02:55 AM  
 Chloride 110 (H) 05/17/2019 02:55 AM  
 CO2 29 05/17/2019 02:55 AM  
 BUN 9 05/17/2019 02:55 AM  
 Creatinine 0.44 (L) 05/17/2019 02:55 AM  
 Calcium 7.5 (L) 05/17/2019 02:55 AM  
 Magnesium 1.9 05/17/2019 02:55 AM  
 Phosphorus 2.2 (L) 05/17/2019 02:55 AM  
  
Lab Results Component Value Date/Time AST (SGOT) 29 05/17/2019 02:55 AM  
 Alk. phosphatase 81 05/17/2019 02:55 AM  
 Protein, total 5.6 (L) 05/17/2019 02:55 AM  
 Albumin 1.9 (L) 05/17/2019 02:55 AM  
 Globulin 3.7 05/17/2019 02:55 AM  
 
No results found for: INR, PTMR, PTP, PT1, PT2, APTT Lab Results Component Value Date/Time Iron 22 (L) 05/16/2019 02:30 PM  
 TIBC 175 (L) 05/16/2019 02:30 PM  
 Iron % saturation 13 (L) 05/16/2019 02:30 PM  
 Ferritin 213 05/16/2019 02:30 PM  
  
Lab Results Component Value Date/Time  pH 7.50 (H) 05/25/2009 05:09 PM  
 PCO2 32 (L) 05/25/2009 05:09 PM  
 PO2 78 (L) 05/25/2009 05:09 PM  
 
No components found for: [place] No results found for: CPK, CKMB Total time: 70 min Counseling / coordination time, spent as noted above: 50 min 
> 50% counseling / coordination?: yes Prolonged service was provided for  []30 min   []75 min in face to face time in the presence of the patient, spent as noted above. Time Start:  
Time End:  
Note: this can only be billed with 45827 (initial) or 22377 (follow up). If multiple start / stop times, list each separately.

## 2019-05-17 NOTE — PROGRESS NOTES
Bedside and Verbal shift change report given to Iliana Lenz RN (oncoming nurse) by Duarte Tan RN (offgoing nurse). Report included the following information SBAR, Kardex, Intake/Output, MAR and Recent Results.

## 2019-05-17 NOTE — PROGRESS NOTES
Attempted to visit Mr. Bettie Tapia who has a new Palliative Care consult. Unable to visit due to Mr. Bettie Tapia receiving treatment in his room. Chaplains will continue to follow. Visited by: Nicole Gauthier MS., [address] ([id number])

## 2019-05-17 NOTE — PROGRESS NOTES
Midline placed today. Swallow test anticipated Monday. Anticipating HH with JOSE FRANCISCO YEE [facility] at discharge. [address], BS, [address] [facility]

## 2019-05-17 NOTE — PROGRESS NOTES
7: 10 Pt off floor for procedure. Verbal shift change report given to Fer Junior (oncoming nurse) by Tommie Calderon RN (offgoing nurse). Report included the following information SBAR, Kardex, Intake/Output, MAR and Recent Results. 11:00 Pt back on floor from post-op alert and oriented. New arias placed by OR. Tube feedings reconnected at 55ml/hr with 160ml flushed q4h. R IJ intact and locked.

## 2019-05-17 NOTE — PERIOP NOTES
Pt. Fall protocol Yellow armband on patient, yellow non skid socks on Bed in low position, all side rails up, call bell in reach Pt. And family instructed in \"call don't fall\" protocol Use your call bell and wait for assistance, staff not family will assist you to get up and move about Pt. And family verbalize understanding of fall precautions and \"call don't fall\" protocol 8:16 AM 
Pt. On continuous monitoring while in preop

## 2019-05-18 NOTE — PROGRESS NOTES
Post op Note revioewed Dr Khan Pastures Arias clear .4 No obstruction, bladder stones evacuated Supportive care Will FU as outpt Continue arias chronic I believe

## 2019-05-18 NOTE — OP NOTES
[facility] Vall[facility] 
OPERATIVE REPORT Name:  Earlene Smalls 
MR#:  [id number] :  1950 ACCOUNT #:  [de-identified] DATE OF SERVICE:  2019 SERVICE:  Urology. PREOPERATIVE DIAGNOSIS:  Bilateral hydronephrosis and bladder stones. POSTOPERATIVE DIAGNOSIS:  Bilateral hydronephrosis and bladder stones. PROCEDURES PERFORMED:  Cystoscopy, litholapaxy, and bilateral retrograde pyelograms. SURGEON:  Nely Bliss MD 
 
ASSISTANT:  staff ANESTHESIA:  General. 
 
COMPLICATIONS:  None. SPECIMENS REMOVED:  Bladder biopsy to Pathology. TUBES AND DRAINS:  A 16-Sami Coude catheter. IMPLANTS:  none. ESTIMATED BLOOD LOSS:  N/A. INDICATIONS FOR PROCEDURE:  This is a 72-year-old male,paraplegic, who has been admitted for sepsis. He is noted to have bilateral hydronephrosis and hydroureter and is managed with a Hale catheter. He presents for cystoscopy with bilateral retrograde pyelograms after all risks and benefits of procedure including alternatives were discussed with him. OPERATIVE FINDINGS:  The patient was noted to have erythematous and edematous bladder mucosa. This was biopsied. The ureteral orifices had some edema as well, but they appeared patent. There was bilateral hydronephrosis, right greater than the left; however, there was prompt drainage from each ureter without any evidence of ureteral stricture or obstruction. He is more likely to have bilateral hydronephrosis from chronic Hale catheter obstruction as the orifices themselves appeared to be likely refluxing. There were no filling defects noted; however, most of the fill was somewhat obscured by retained bowel contrast.  He had some small and flat bladder stones that appeared to be likely dislodged catheter encrustation. These were irrigated out of the bladder. I feel these bigger pieces had to be broken up with the tip of the cystoscope and then flushed out. DESCRIPTION OF OPERATIVE PROCEDURE:  After informed consent was obtained, the patient was taken to the operating room and placed in the dorsal lithotomy position after anesthesia and preop antibiotics were administered. The external genitalia was prepped and draped in the standard fashion. A timeout procedure was performed. The patient and procedure correctly identified. Next, cystoscopy was performed with a 21-Occitan rigid cystoscope. This was inserted into the urethra and passed through the bladder under direct vision. His previous catheter had been removed. There was a little bit of encrustation at the end. We entered the bladder. There were some multiple small flat and thin stones, but appeared to be consistent with encrustation that had been broken off the tip of the previous catheter. Most of these were irrigated out with just irrigation through the sheath. Some of the larger pieces had to be broken up, but we did not immediately have the grasper or rigid forceps. Therefore the tip of the scope was used gently to push against the stones and these fragmented very easily and then these were irrigated out. We then proceeded to do our retrograde pyelograms, first on the right side and then on the left by cannulating the orifice with a 5-Occitan open-ended ureteral catheter. Contrast was injected in a retrograde fashion outlining slight tortuous ureters and these were dilated. There was a little bit of J hooking and we then used a wire to advance the ureteral catheter proximally. Once this was done, it easily outlined the entire ureter, was dilated all the way up to the collecting system which also was dilated with no filling defects. There were no strictures and we continued to inject contrast as we went through the catheter and the ureter was patent all the way down.   Then, intermittent fluoroscopy was used to observe peristalsis of contrast from the collecting system down the entire ureter and into the bladder. The dilation on the right side was noted to be greater than on the left, but findings were similar. There was no evidence of any ureteral stricture or obstruction and there was prompt drainage bilaterally. There was significant amount of retained bowel contrast which obscured our view; however with continuous injection of contrast, we could outline the entirety of each ureter. We then drained the bladder and performed biopsies of two areas of the posterior bladder where there was some mucosal edema most likely to be just irritation of the catheter. We decided to just go ahead and biopsy these to ensure there was no evidence of any malignancy. We then performed some more fluoroscopy on each collecting system. There appeared to be good drainage on each side. Visually, there was continuous efflux of contrast into the bladder from each ureteral orifice. The patient tolerated the procedure well. A new 16-[place] Coude catheter was placed. He was then taken to the PACU in stable condition. MD ALE Waldrop/[id number] 
D:  05/17/2019 11:02 
T:  05/17/2019 22:27 
JOB #:  [id number]

## 2019-05-18 NOTE — PROGRESS NOTES
0720- Bedside and Verbal shift change report given to [facility]"" (oncoming nurse) by Brii Still (offgoing nurse). Report included the following information SBAR, Kardex, Intake/Output, MAR and Recent Results. Pt observed in bed, resting quietly, on room air, denies any pain at this time. Will assess. 36- This RN in patient room to assess patient. RN asked patient if he would like to have prn suppository today. Pt stated that he would like to wait until tomorrow morning with am meds for suppository. 1908- Bedside and Verbal shift change report given to Brii Still (oncoming nurse) by [address] (offgoing nurse). Report included the following information SBAR, Kardex, Intake/Output, MAR and Recent Results.
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
## 2019-05-18 NOTE — PROGRESS NOTES
[facility] NAME: Ke Dillard :  1950 MRM:  [id number] Date/Time: 2019  10:10 AM   
 
  
Assessment and Plan: Dysphagia / Quadriplegia / S/P  shunt / Flexion contractures / Sacral wound - POA, severe, chronic, but dysphagia is new or worse than baseline. Appreciate speech. Failed MBS. Now with Dobhoff for feedings. Re-eval Monday, and if fails again with have GI place PEG, per family wishes. Nutrition consult. Continue baclofen, oxybutynin, PPI, laxatives Nephrolithiasis / Hydronephrosis / Hematuria - POA and underlying source of sepsis. Appreciate urology input, and did successful cystoscopy with bilateral stents and stone removal . UTI (urinary tract infection), complicated, due to indwelling arias catheter / Severe sepsis / Bacteremia - Sepsis POA, urosepsis due to stone and underlying chronic arias. Ur Cx with pan sensitve E coli and proteus, blood cx with 2 species pan sensitive Proteus. Continue IV ceftriaxone, complete prior to discharge. Sepsis and bacteremia now resolved. Repeat blood cx now NGTD and now off pressors. Anemia / Thrombocytopenia - POA and worsened with hydration. Check serologies and monitor. Suspect chronic disease. Chronic myelodysplasia not out of the question Hypokalemia - Replete IV and follow labs. Subjective: Chief Complaint:  Poor speech, weak, no pain. Dobhoff needed re positioning this AM.  Family requests no turns when they are not present. ROS: 
(bold if positive, if negative) Tolerating usual minimal PT  Tolerating NGT Diet Objective:  
 
Last 24hrs VS reviewed since prior progress note. Most recent are: 
 
Visit Vitals /66 (BP 1 Location: Left arm, BP Patient Position: At rest) Pulse 92 Temp 98.2 °F (36.8 °C) Resp 18 Ht 6' (1.829 m) Wt 89 kg (196 lb 3.4 oz) SpO2 93% BMI 26.61 kg/m² SpO2 Readings from Last 6 Encounters: 05/18/19 93% 05/10/19 97% 04/30/19 98% 04/26/19 98% 04/19/19 98% 04/09/19 98% O2 Flow Rate (L/min): 2 l/min Intake/Output Summary (Last 24 hours) at 5/18/2019 1010 Last data filed at 5/18/2019 2281 Gross per 24 hour Intake 50 ml Output 2175 ml Net -2125 ml Physical Exam: 
 
Gen:  Frail, in no acute distress HEENT:  Pink conjunctivae, PERRL, hearing intact to voice, moist mucous membranes Neck:  Supple, without masses, thyroid non-tender Resp:  No accessory muscle use, clear breath sounds without wheezes rales or rhonchi 
Card:  No murmurs, normal S1, S2 without thrills, bruits or peripheral edema Abd:  Soft, non-tender, non-distended, reduced bowel sounds are present, no mass Lymph:  No cervical or inguinal adenopathy Musc:  No cyanosis or clubbing Skin:  No rashes or ulcers, skin turgor is good Neuro:  Cranial nerves are grossly intact, complete motor weakness, follows commands vaguely Psych:  Poor insight, oriented to person, place Telemetry reviewed:   normal sinus rhythm 
__________________________________________________________________ Medications Reviewed: (see below) Medications:  
 
Current Facility-Administered Medications Medication Dose Route Frequency  baclofen (LIORESAL) tablet 2.5 mg  2.5 mg Oral TID  oxybutynin (DITROPAN) tablet 5 mg  5 mg Oral TID  zinc oxide-cod liver oil (DESITIN) 40 % paste   Topical DAILY  white petrolatum-mineral oil (EUCERIN) cream   Topical DAILY  pantoprazole (PROTONIX) 40 mg in sodium chloride 0.9% 10 mL injection  40 mg IntraVENous Q12H  
 bisacodyl (DULCOLAX) suppository 10 mg  10 mg Rectal DAILY PRN  
 cefTRIAXone (ROCEPHIN) 2 g in 0.9% sodium chloride (MBP/ADV) 50 mL  2 g IntraVENous Q24H  
 sodium chloride (NS) flush 5-10 mL  5-10 mL IntraVENous PRN  
 sodium chloride (NS) flush 5-40 mL  5-40 mL IntraVENous Q8H  
 sodium chloride (NS) flush 5-40 mL  5-40 mL IntraVENous PRN  
  acetaminophen (TYLENOL) tablet 650 mg  650 mg Oral Q4H PRN  
 naloxone (NARCAN) injection 0.4 mg  0.4 mg IntraVENous PRN  
 ondansetron (ZOFRAN) injection 4 mg  4 mg IntraVENous Q4H PRN  
 menthol-zinc oxide (CALMOSEPTINE) 0.44-20.6 % ointment 1 Each  1 Each Topical DAILY PRN  
 traMADol (ULTRAM) tablet 75 mg  75 mg Oral Q6H PRN  
 heparin (porcine) injection 5,000 Units  5,000 Units SubCUTAneous Q8H Lab Data Reviewed: (see below) Lab Review:  
 
Recent Labs 05/17/19 
0255 05/16/19 
0206 WBC 6.8 4.8 HGB 8.4* 8.3* HCT 25.5* 25.1*  
* 114* Recent Labs 05/18/19 
0449 05/17/19 
0255  140  
K 3.8 3.1*  
 110* CO2 28 29 * 89 BUN 7 9 CREA 0.55* 0.44* CA 7.5* 7.5* MG 2.1 1.9 PHOS 2.8 2.2* ALB 1.8* 1.9* TBILI 0.4 0.4 SGOT 17 29 ALT 39 50 Lab Results Component Value Date/Time Glucose (POC) 169 (H) 05/18/2019 06:58 AM  
 Glucose (POC) 123 (H) 05/17/2019 08:49 PM  
 
No results for input(s): PH, PCO2, PO2, HCO3, FIO2 in the last 72 hours. No results for input(s): INR in the last 72 hours. No lab exists for component: INREXT, INREXT All Micro Results Procedure Component Value Units Date/Time CULTURE, BLOOD [[id number]] Collected:  05/12/19 8069 Order Status:  Completed Specimen:  Blood Updated:  05/18/19 0229 Special Requests: NO SPECIAL REQUESTS Culture result: NO GROWTH 6 DAYS     
 CULTURE, BLOOD, PAIRED [[id number]]  (Abnormal)  (Susceptibility) Collected:  05/11/19 1137 Order Status:  Completed Specimen:  Blood Updated:  05/17/19 9649 Special Requests: NO SPECIAL REQUESTS Culture result:    
  PROTEUS MIRABILIS ISOLATED FROM 3 OF 4 BOTTLES DRAWN. ..CL WHITE AND CL BLUE  
     
      
  PRELIMINARY REPORT OF GRAM NEGATIVE RODS GROWING IN 3 OF 4 BOTTLES DRAWN CALLED TO AND READ BACK BY GIA KELLEY RN AT 1036 05.12.2019 LAS  
     
      
  REMAINING BOTTLE(S) HAS/HAVE NO GROWTH IN 5 DAYS CULTURE, URINE [[id number]]  (Abnormal)  (Susceptibility) Collected:  05/11/19 1137 Order Status:  Completed Specimen:  Urine from [facility] Specimen Updated:  05/14/19 1304 Special Requests: NO SPECIAL REQUESTS [place] Count --     
  >100,000 COLONIES/mL Culture result:    
  PROTEUS MIRABILIS (PREDOMINATING) ESCHERICHIA COLI     
 MRSA CULTURE [[id number]] Collected:  05/11/19 1845 Order Status:  Canceled Specimen:  Nares CULTURE, BLOOD [[id number]] Order Status:  Canceled Specimen:  Blood URINE CULTURE HOLD SAMPLE [[id number]] Collected:  05/11/19 1137 Order Status:  Completed Specimen:  Urine from Serum Updated:  05/11/19 1141 Urine culture hold URINE ON HOLD IN MICROBIOLOGY DEPT FOR 3 DAYS. IF UNPRESERVED URINE IS SUBMITTED, IT CANNOT BE USED FOR ADDITIONAL TESTING AFTER 24 HRS, RECOLLECTION WILL BE REQUIRED. Other pertinent lab: none Total time spent with patient: 28 Minutes I reviewed chart, notes, data and current medications in the medical record. I have examined and treated the patient at bedside during this period. Care Plan discussed with: Patient, Family, Care Manager, Nursing Staff, Consultant/Specialist and >50% of time spent in counseling and coordination of care Discussed:  Care Plan and D/C Planning Prophylaxis:  H2B/PPI Disposition:   PT, OT, RN 
        
___________________________________________________ Attending Physician: Ashlee Jimenez MD

## 2019-05-19 NOTE — PROGRESS NOTES
0730- Bedside and Verbal shift change report given to [facility]\"" (oncoming nurse) by David Torres (offgoing nurse). Report included the following information SBAR, Kardex, Intake/Output, MAR and Recent Results. Pt observed in bed, resting quietly,on room air, denies any pain at this time. Will assess. 1928- Bedside and Verbal shift change report given to [facility] (oncoming nurse) by [facility] \[facility]\"" (offgoing nurse). Report included the following information SBAR, Kardex, Intake/Output, MAR and Recent Results.

## 2019-05-19 NOTE — PROGRESS NOTES
Urology Progress Note Subjective:  
 
Daily Progress Note: 2019 8:45 AM 
 
Montserrat Hussein is doing good. He reports pain is well controlled. He has no complaints. He is tolerating tube feedings and unable to get out of bed. Indwelling catheter is draining well. Objective:  
 
Visit Vitals /70 (BP 1 Location: Right arm, BP Patient Position: At rest) Pulse 93 Temp 99.3 °F (37.4 °C) Resp 18 Ht 6' (1.829 m) Wt 89 kg (196 lb 3.4 oz) SpO2 93% BMI 26.61 kg/m² Temp (24hrs), Av.3 °F (37.4 °C), Min:98.4 °F (36.9 °C), Max:100.7 °F (38.2 °C) Intake and Output: 
 1901 -  0700 In: 1250 Out: 4425 [[id number]] No intake/output data recorded. Physical Exam:  
General appearance: alert, cooperative, no distress, appears stated age Incision: NA 
 
Lab/Data Review: 
CMP: No results found for: NA, K, CL, CO2, AGAP, GLU, BUN, CREA, GFRAA, GFRNA, CA, MG, PHOS, ALB, TBIL, TP, ALB, GLOB, AGRAT, SGOT, ALT, GPT 
CBC: No results found for: WBC, HGB, HGBEXT, HCT, HCTEXT, PLT, PLTEXT, HGBEXT, HCTEXT, PLTEXT Assessment/Plan:  
 
Principal Problem: 
  Severe sepsis ([address] (2019) Active Problems: 
  Quadriplegia ([address] (2019) Flexion contractures (2019) Sacral wound (2019) Nephrolithiasis (2019) Hepatitis C virus infection cured after antiviral drug therapy (3/10/2019) Hydronephrosis (2019) S/P  shunt (2019) UTI (urinary tract infection) (2019) DNR (do not resuscitate) (2019) Hematuria (2019) Anemia (2019) Dysphagia (2019) Hypokalemia (2019) Goals of care, counseling/discussion () 
 
  ACP (advance care planning) () Plan:  Doing well and continue with treatment Persistent LG fever Would check CBC, reculture if spikes Hale drained CYstoscopy reiterated without obstruction

## 2019-05-19 NOTE — DISCHARGE SUMMARY
Physician Interim Discharge Summary Patient ID: 
Jose Cardenas [id number] 
52 y.o. 
1950 Admit date: 5/11/2019 Admission Diagnoses: Severe sepsis ([address] [A41.9, R65.20] Current Diagnoses:   
Principal Diagnosis Dysphagia Other Diagnoses Severe sepsis ([address] (5/11/2019) Quadriplegia ([address] (2/4/2019) Flexion contractures (2/4/2019) Sacral wound (2/4/2019) Nephrolithiasis (2/4/2019) Hepatitis C virus infection cured after antiviral drug therapy (3/10/2019) Hydronephrosis (5/11/2019) S/P  shunt (5/11/2019) UTI (urinary tract infection) (5/11/2019) DNR (do not resuscitate) (5/11/2019) Hematuria (5/11/2019) Anemia (5/16/2019) 
 
phagia (5/16/2019) Hypokalemia (5/16/2019) Goals of care, counseling/discussion () 
  ACP (advance care planning) () Hospital Course through 5/19/2019:  
Dysphagia / Quadriplegia / S/P  shunt / Flexion contractures / Sacral wound - POA, severe, chronic, but dysphagia is new or worse than baseline. Appreciate speech. Failed MBS. Now with Dobhoff for feedings. Re-eval Monday, and if fails again with have GI place PEG, per family wishes. Nutrition consult. Continue baclofen, oxybutynin, PPI, laxatives 
  
Nephrolithiasis / Hydronephrosis / Hematuria - POA and underlying source of sepsis. Appreciate urology input, and did successful cystoscopy with bilateral stents and stone removal 5/17.  
  
UTI (urinary tract infection), complicated, due to indwelling arias catheter / Severe sepsis / Bacteremia - Sepsis POA, urosepsis due to stone and underlying chronic arias. Ur Cx with pan sensitve E coli and proteus, blood cx with 2 species pan sensitive Proteus. Continue IV ceftriaxone, complete prior to discharge. Sepsis and bacteremia now resolved. Repeat blood cx now NGTD and now off pressors. 
  
Anemia / Thrombocytopenia - POA and worsened with hydration.   Check serologies and monitor. Suspect chronic disease. Chronic myelodysplasia not out of the question 
  
Hypokalemia - Replete IV and follow labs. PCP: Gemma Ken NP Consults: GI, Neurology, Urology and Palliative Care Significant Diagnostic Studies: See Hospital Course Further details by discharging physician Signed: 
Gladys Santacruz MD 
5/19/2019 
12:08 PM

## 2019-05-20 NOTE — PROGRESS NOTES
Progress Note Patient: Dory Mendes MRN: [id number]  SSN: [id number] YOB: 1950 Age: 79 y.o. Sex: male Height: Height: 6' (182.9 cm) Weight: Weight: 89 kg (196 lb 3.4 oz) BMI: Body mass index is 26.61 kg/m². PCP: Glo Figueroa, [place] [phone] [id number] Contact:  Primary Emergency Contact: Marisela Saunders, Home Phone: [phone] Hospital Day: 10 - Admitted 2019 11:04 AM by Mauricio Xavier MD Surgeon(s): 
Remy Alanis MD 3 Days Post-Op Procedure(s): 
CYSTOSCOPY WITH  BILATERAL RETROGRADES; Lithalopaxy;bladder biopsy Assessment/Plan: 1. Bladder calculi - s/p CYSTOSCOPY WITH  BILATERAL RETROGRADES; Lithalopaxy;bladder biopsy Pt states pain better . No urinary problems 2. Fever - improved  Afebrile today , white count normal  On abx for + urine cx Imaging: N/A Exam: EXAMINATION:   
Appearance:  well-developed NAD Conjunctiva/Lids: conjunctivae and lids normal  
External Ears/Nose:   normal no lesions or deformities Neck:  supple Respiratory Effort:  breathing easily Lower Extremity: no edema Abdomen/Flank: soft non-tender without masses; no CVA tenderness Liver/Spleen: no organomegaly Hernia: no ventral hernia Gait/Station: Bedrest   
Skin Inspection:  warm and dry Mood/Affect: normal  
 
                
  
ROS:  Denies Chest Pain, SOB ID: Temp (24hrs), Av.5 °F (36.9 °C), Min:97.6 °F (36.4 °C), Max:99.3 °F (37.4 °C) 
 
2019: WBC 29.3 K/uL* (Ref range: 4.1 - 11.1 K/uL); WBC 27.4 K/uL* (Ref range: 4.1 - 11.1 K/uL) 2019: WBC 20.5 K/uL* (Ref range: 4.1 - 11.1 K/uL) 2019: WBC 5.6 K/uL (Ref range: 4.1 - 11.1 K/uL) 2019: WBC 3.6 K/uL* (Ref range: 4.1 - 11.1 K/uL) 2019: WBC 4.8 K/uL (Ref range: 4.1 - 11.1 K/uL) 2019: WBC 6.8 K/uL (Ref range: 4.1 - 11.1 K/uL) 2019: WBC 8.9 K/uL (Ref range: 4.1 - 11.1 K/uL) 2019: WBC 9.3 K/uL (Ref range: 4.1 - 11.1 K/uL) Current Antimicrobial Therapy (168h ago, onward) Ordered     Start Stop  
 05/12/19 1250  cefTRIAXone (ROCEPHIN) 2 g in 0.9% sodium chloride (MBP/ADV) 50 mL  2 g,   IntraVENous,   EVERY 24 HOURS    
 05/12/19 1300 --  
  
 
Cultures: All Micro Results Procedure Component Value Units Date/Time CULTURE, BLOOD [[id number]] Collected:  05/12/19 5980 Order Status:  Completed Specimen:  Blood Updated:  05/18/19 5549 Special Requests: NO SPECIAL REQUESTS Culture result: NO GROWTH 6 DAYS     
 CULTURE, BLOOD, PAIRED [[id number]]  (Abnormal)  (Susceptibility) Collected:  05/11/19 1137 Order Status:  Completed Specimen:  Blood Updated:  05/17/19 0755 Special Requests: NO SPECIAL REQUESTS Culture result:    
  PROTEUS MIRABILIS ISOLATED FROM 3 OF 4 BOTTLES DRAWN. ..CL WHITE AND CL BLUE  
     
      
  PRELIMINARY REPORT OF GRAM NEGATIVE RODS GROWING IN 3 OF 4 BOTTLES DRAWN CALLED TO AND READ BACK BY GIA KELLEY RN AT 1036 05.12.2019 LAS  
     
      
  REMAINING BOTTLE(S) HAS/HAVE NO GROWTH IN 5 DAYS  
     
 CULTURE, URINE [[id number]]  (Abnormal)  (Susceptibility) Collected:  05/11/19 1137 Order Status:  Completed Specimen:  Urine from [facility] Specimen Updated:  05/14/19 1304 Special Requests: NO SPECIAL REQUESTS [place] Count --     
  >100,000 COLONIES/mL Culture result:    
  PROTEUS MIRABILIS (PREDOMINATING) ESCHERICHIA COLI     
 MRSA CULTURE [[id number]] Collected:  05/11/19 1845 Order Status:  Canceled Specimen:  Nares CULTURE, BLOOD [[id number]] Order Status:  Canceled Specimen:  Blood URINE CULTURE HOLD SAMPLE [[id number]] Collected:  05/11/19 1137 Order Status:  Completed Specimen:  Urine from Serum Updated:  05/11/19 1141 Urine culture hold URINE ON HOLD IN MICROBIOLOGY DEPT FOR 3 DAYS. IF UNPRESERVED URINE IS SUBMITTED, IT CANNOT BE USED FOR ADDITIONAL TESTING AFTER 24 HRS, RECOLLECTION WILL BE REQUIRED. Pulm: Room air 2 l/min 95 % IS:   of predicted GI: Intake: DIET TUBE FEEDING 
DIET NPO   Appetite: NPO % Diet Eaten: 0 %(NPO) Abdominal Assessment: Intact Bowel Sounds: Hypoactive Patient Vitals for the past 168 hrs: 
 Stool Occurrence(s)  
05/19/19 1345 1  
05/15/19 1417 1 Pain: 0/10   -   - Current Analgesic Therapy (168h ago, onward) Ordered     Start Stop  
 05/11/19 1422  traMADol (ULTRAM) tablet 75 mg  75 mg,   Oral,   EVERY 6 HOURS AS NEEDED    
 05/11/19 1422 --  
 05/11/19 1319  acetaminophen (TYLENOL) tablet 650 mg  650 mg,   Oral,   EVERY 4 HOURS AS NEEDED    
 05/11/19 1318 --  
  
  
:   
[facility];Draining - Urinary Catheter 05/17/19 2- way;Coude-Status: Draining;Patent 5/11/2019: Creatinine 2.60 MG/DL* (Ref range: 0.70 - 1.30 MG/DL); Creatinine 1.75 MG/DL* (Ref range: 0.70 - 1.30 MG/DL) 
5/12/2019: Creatinine 1.21 MG/DL (Ref range: 0.70 - 1.30 MG/DL) 
5/13/2019: Creatinine 0.74 MG/DL (Ref range: 0.70 - 1.30 MG/DL) 
5/14/2019: Creatinine 0.58 MG/DL* (Ref range: 0.70 - 1.30 MG/DL) 
5/17/2019: Creatinine 0.44 MG/DL* (Ref range: 0.70 - 1.30 MG/DL) 
5/18/2019: Creatinine 0.55 MG/DL* (Ref range: 0.70 - 1.30 MG/DL) 
5/19/2019: Creatinine 0.69 MG/DL* (Ref range: 0.70 - 1.30 MG/DL) 
5/20/2019: Creatinine 0.62 MG/DL* (Ref range: 0.70 - 1.30 MG/DL) No results for input(s): FCREA in the last 336 hours. Lines:   
  
  
   
   
Vitals: O2 Device: Room air O2 Flow Rate (L/min): 2 l/min Patient Vitals for the past 24 hrs: 
 BP Temp Pulse Resp SpO2  
05/20/19 0730 144/76 98.1 °F (36.7 °C) 82 17 95 % 05/20/19 0434 151/83 98.3 °F (36.8 °C) 85 18 96 % 05/20/19 0035 127/77 99 °F (37.2 °C) 85 18 94 % 05/19/19 2007 165/87 99.3 °F (37.4 °C) 91 18 95 % 05/19/19 1505 131/70 97.6 °F (36.4 °C) 87 18 96 % I&O's:   
Date 05/19/19 0700 - 05/20/19 2229 05/20/19 0700 - 05/21/19 9759 Shift 2921-2339 5948-0229 24 Hour Total 4090-9309 1487-2830 24 Hour Total  
INTAKE  
 I.V.(mL/kg/hr)  250(0.2) 250(0.1) Volume (cefTRIAXone (ROCEPHIN) 2 g in 0.9% sodium chloride (MBP/ADV) 50 mL)  250 250 NG/ 320 570 Water Flush Volume (mL) (Nasogastric Tube 05/14/19)  320 320 Medication Volume (Nasogastric Tube 05/14/19) 140  140 Intake (ml) (Nasogastric Tube 05/14/19) 110  110 Shift Total(mL/kg) 250(2.8) 570(6.4) 820(9.2) OUTPUT Urine(mL/kg/hr) 1450(1.4) 2050(1.9) 3500(1.6) 925  925 Urine Output (mL) (Urinary Catheter 05/17/19 2- way;Coude) 1450 2050 3500 925  925 Stool Stool Occurrence(s) 1 x  1 x Shift Total(mL/kg) 1450(16.3) [id number]) 3500(39.3) 925(10.4)  925(10.4) NET -1200 -1480 -2680 -925  -925 Weight (kg) 89 89 89 89 89 89 Meds:   
Current Facility-Administered Medications:  
  baclofen (LIORESAL) tablet 2.5 mg, 2.5 mg, Oral, TID, Maritza Elizabeth MD, 2.5 mg at 05/20/19 3667   oxybutynin (DITROPAN) tablet 5 mg, 5 mg, Oral, TID, Nikkie Daily MD, 5 mg at 05/20/19 0949 
  zinc oxide-cod liver oil (DESITIN) 40 % paste, , Topical, DAILY, Markos Cho MD 
  white petrolatum-mineral oil (EUCERIN) cream, , Topical, DAILY, Markos Cho MD 
  pantoprazole (PROTONIX) 40 mg in sodium chloride 0.9% 10 mL injection, 40 mg, IntraVENous, Q12H, Maritza Elizabeth MD, 40 mg at 05/20/19 0949 
  bisacodyl (DULCOLAX) suppository 10 mg, 10 mg, Rectal, DAILY PRN, Landon Diallo MD, 10 mg at 05/19/19 1006   cefTRIAXone (ROCEPHIN) 2 g in 0.9% sodium chloride (MBP/ADV) 50 mL, 2 g, IntraVENous, Q24H, Karla Cho MD, Last Rate: 100 mL/hr at 05/19/19 1333, 2 g at 05/19/19 1333   sodium chloride (NS) flush 5-10 mL, 5-10 mL, IntraVENous, PRN, Jun Mcneil MD 
  sodium chloride (NS) flush 5-40 mL, 5-40 mL, IntraVENous, Q8H, Jun Mcneil MD, 10 mL at 05/20/19 7387   sodium chloride (NS) flush 5-40 mL, 5-40 mL, IntraVENous, PRN, Tiffany Cintron MD 
   acetaminophen (TYLENOL) tablet 650 mg, 650 mg, Oral, Q4H PRN, Jorje Mcneil MD, 650 mg at 05/19/19 0509 
  naloxone [facility]) injection 0.4 mg, 0.4 mg, IntraVENous, PRN, Jorje Mcneil MD 
  ondansetron [facility]) injection 4 mg, 4 mg, IntraVENous, Q4H PRN, Jorje Mcneil MD 
  menthol-zinc oxide (CALMOSEPTINE) 0.44-20.6 % ointment 1 Each, 1 Each, Topical, DAILY PRN, Jorje Mcneil MD 
  traMADol (ULTRAM) tablet 75 mg, 75 mg, Oral, Q6H PRN, Jorje Mcneil MD 
  heparin (porcine) injection 5,000 Units, 5,000 Units, SubCUTAneous, Q8H, Jorje Mcneil MD, 5,000 Units at 05/20/19 4841 Labs: Recent Labs  
  05/20/19 
0013 05/19/19 
1110 WBC 9.3 8.9 HGB 8.6* 8.3* HCT 27.5* 25.5*  
 247 Recent Labs  
  05/20/19 
1104 05/19/19 
1110 05/18/19 
0449  139 137  
K 4.3 3.7 3.8  107 107 CO2 25 27 28 * 134* 155* BUN 9 8 7 CREA 0.62* 0.69* 0.55* CA 7.5* 7.6* 7.5* MG 2.2 2.1 2.1 PHOS 3.4 3.7 2.8 Plan discussed with Dr. Sania Kulkarni Signed By: Regina Simpson, JUSTEN - May 20, 2019

## 2019-05-20 NOTE — PROGRESS NOTES
Problem: Dysphagia (Adult) Goal: *Acute Goals and Plan of Care (Insert Text) Description Swallowing goals initiated 5-13-19: 
1)  Daily re-eval of swallowing until managing secretions better and ready for repeat MBS by 5-17-19 Outcome: Not Met SPEECH PATHOLOGY MODIFIED BARIUM SWALLOW STUDY Patient: Kayleigh Handy (30 y.o. male) Date: 5/20/2019 Primary Diagnosis: Severe sepsis ([facility] [address] [A41.9, R65.20] Procedure(s) (LRB): 
CYSTOSCOPY WITH  BILATERAL RETROGRADES; Lithalopaxy;bladder biopsy (Bilateral) 3 Days Post-Op Precautions: aspiration ASSESSMENT : 
Based on the objective data described below, the patient failed his third MBS in a week. He continued to present with weakness in swallow and BOT, resulting in pharyngeal residue after the swallow. He had a mild delay in swallow, with trace, consistent deep penetration with thins that were ultimately aspirated. He had additional aspiration from pharyngeal residue. Although he is more alert and talkative than last week, no change in swallow function. Patient will need more permanent alternative feeding at this time and [facility] SLP therapy for swallow function exercisess. Patient will benefit from skilled intervention to address the above impairments. Patient?s rehabilitation potential is considered to be Fair Factors which may influence rehabilitation potential include:  
? None noted ? Mental ability/status ? Medical condition ? Home/family situation and support systems ? Safety awareness ? Pain tolerance/management ? Other: PLAN : 
Recommendations and Planned Interventions: 
PEG. Home Health SLP for swallowing exercises. Frequency/Duration: Patient will be followed by speech-language pathology 3 times a week to address goals. Discharge Recommendations: Home Health SUBJECTIVE:  
Patient stated ? Can my daughter watch the test?? Pavan Sorenson 
 
 OBJECTIVE:  
 
Past Medical History:  
Diagnosis Date Disorder of brainstem Bruised brainstem Double vision Frequent UTI History of vascular access device 05/17/2019 [facility] 4Fr Midline 82BT Right Basilic for poor access Kidney stones Quadriplegia ([address] Past Surgical History:  
Procedure Laterality Date HX CSF SHUNT    
 HX CSF SHUNT    
 HX ORTHOPAEDIC Left HX OTHER SURGICAL    
 bladder stone Prior Level of Function/Home Situation:  
Home Situation Home Environment: Private residence # Steps to Enter: 0 One/Two Story Residence: One story Living Alone: No 
Support Systems: Family member(s) Patient Expects to be Discharged to[de-identified] Private residence Current DME Used/Available at Home: Wheelchair, power, Shower chair, Other (comment)(ceiling lift ) Diet prior to admission: soft, thins Current Diet:  NPO with dubhoff TF. Radiologist: Rubin Marshall Film Views: Lateral 
Patient Position: upright in Hausted chair Trial 1: Trial 2:  
Consistency Presented: Thin liquid; Nectar thick liquid;Pudding How Presented: SLP-fed/presented;Spoon ORAL PHASE:     
Bolus Acceptance: (decreased labial closure with frontal oral leakage) WITH THINS Bolus Formation/Control: Impaired: Anterior;Posterior;Delayed with sluggish lingual pumping.   :    
Propulsion: Delayed (# of seconds); Discoordination, with thins greater than purees. Oral Residue: Lingual;Right;Left(mild) with thins greater than purees. PHARYNGEAL PHASE:     
Initiation of Swallow: Triggered at pyriform sinus(es)(with thins) and nectars; Triggered at valleculae with purees, Timing: Pooling 1-5 sec Penetration: Trace; Before swallow;During swallow; To cords(silent) with thins and nectars. Penetration after the swallow  from vallecular and/or pyriform residue.  (varied) Aspiration/Timing: Repeated;Trace;Silent aspiration with thins due to delay and reduced airway closure. Aspiration after the swallow from penetration/residue, mostly from thin or nectar residue in pyriforms, but sometimes from pureed residue in valleculae Pharyngeal Clearance: Vallecular residue;10-50% Attempted Modifications: Alternate liquids/solids Trial 3: Trial 4:  
     
     
     
     
 :    :    
     
     
    
     
     
     
     
     
     
     
     
 
Decreased Tongue Base Retraction?: Yes(moderate) Laryngeal Elevation: Inadequate epiglottic inversion; Reduced excursion with laryngeal vestibule gap Aspiration/Penetration Score: 8 (Aspiration-Contrast passes cords/glottis with no effort to eject, ie/silent aspiration) NOMS:  
The NOMS functional outcome measure was used to quantify this patient's level of swallowing impairment. Based on the NOMS, the patient was determined to be at level 1 for swallow function NOMS Swallowing Levels: 
Level 1 (CN): NPO Level 2 (CM): NPO but takes consistency in therapy Level 3 (CL): Takes less than 50% of nutrition p.o. and continues with nonoral feedings; and/or safe with mod cues; and/or max diet restriction Level 4 (CK): Safe swallow but needs mod cues; and/or mod diet restriction; and/or still requires some nonoral feeding/supplements Level 5 (CJ): Safe swallow with min diet restriction; and/or needs min cues Level 6 (CI): Independent with p.o.; rare cues; usually self cues; may need to avoid some foods or needs extra time Level 7 (CH): Independent for all p.o. KASSANDRA. (2003). National Outcomes Measurement System (NOMS): Adult Speech-Language Pathology User's Guide. COMMUNICATION/EDUCATION:  
Patient was educated regarding His deficit(s) of dysphagia  as this relates to His diagnosis of sepsis. He demonstrated Fair understanding as evidenced by discussion. Daughter attended MBS with patient and understood. Hilda [place] The patient?s plan of care including findings from [facility], recommendations, planned interventions, and recommended diet changes were discussed with: Physician. ? Posted safety precautions in patient's room. ? Patient/family have participated as able in goal setting and plan of care. ?  Patient/family agree to work toward stated goals and plan of care. ?  Patient understands intent and goals of therapy, but is neutral about his/her participation. ? Patient is unable to participate in goal setting and plan of care. Thank you for this referral. 
SUPRIYA Cruz Time Calculation: 20 mins

## 2019-05-20 NOTE — PROGRESS NOTES
Today's Updates/Plan 1. MBS performed today. Per SLP, MBS failed. 2. Plan for PEG tube 3. CM will continue to follow for discharge planning. Discharge plan 1. Home with JOSE FRANCISCO FREEDMAN [facility] [address], BS, [facility]

## 2019-05-20 NOTE — PROGRESS NOTES
Nutrition Assessment: 
 
RECOMMENDATIONS/INTERVENTION(S):  
Noted pt failed 3 MBS- if PEG placed- recommend starting TF @ goal of 55 mL/hr as pt has been tolerating via NG tube. 1.  TF REC's: Jevity 1.5 @ 55ml/hr with 160ml h20 flush q4h when IVF d/c. (Goal TF will provide 1980kcals, 84gm pro, 1963ml fluid - this will meet 99% kcal needs and 100% pro needs) 4. Monitor PEG placement, TF tolerance, weight. ASSESSMENT:  
5/20:  Noted pt failed 3rd MBS per SLP. If POC is for PEG, recommend starting TF soon after placement with goal rate of 55 mL/hr as pt has been receiving TF via NG tube and tolerating. Nonpitting edema 1+ all extremities. Last BM 5/19. Labs reviewed. 5/16:  New consult received for TF management. Pt with Dobbhoff with Jevity 1.5 running at goal of 55 mL/hr with flushes. No changes at this time. Monitor weight. Pt had MBS again today which he failed. Will reassess on Monday and discuss PEG placement if appropriate. 5/14: S/P SLP eval and MBS - recommending pt be NPO for 3 days. Plan is to have NGT placed and start TF. Rec's above. 5/13: 78 yo male admitted for sepsis. RD assessment for PU screen. PMhx: quadriplegia. Weight WNL per BMI per age. Daughter present at time of visit, provided all information for pt. States his weight has been stable at home, no change. Pt has a very good appetite at home, they do not use ONS at home because he has never needed them secondary to good PO. Starting 2 days PTA pt was c/o nausea and not wanting to eat much. No longer c/o nausea but the poor PO intake has continued since admission. Pt taking in < 50% meals. RN reports pt coughing and requested SLP consult - ordered and pt made NPO until eval.  Pt states he is willing to try supplements. Multiple wounds present. Labs reviewed. Meds: Vit D, Kcl. Diet Order: Regular 
% Eaten:   
No data found. Pertinent Medications: [x] Reviewed Labs: [x] Reviewed Anthropometrics: Height: 6' (182.9 cm) Weight: 83.4 kg (183 lb 13.8 oz) IBW (%IBW):   ( ) UBW (%UBW):   (  %) BMI: Body mass index is 24.94 kg/m². This BMI is indicative of: 
 [] Underweight    [x] Normal    [] Overweight    []  Obesity    []  Extreme Obesity (BMI>40) Estimated Nutrition Needs (Based on): 1988 Kcals/day(BMR 1657 x AF 1.2) , 84 g(84-101gm (1-1.2gm/kg/d)) Protein Carbohydrate: At Least 130 g/day  Fluids: 1988 mL/day (1 ml/kcal) Last BM: 5/19   [x]Active     []Hyperactive  []Hypoactive       [] Absent   BS Skin:    [] Intact   [] Incision  [x] Breakdown - stage II to buttock, wound to sacrum and right knee  [x] DTI - buttocks  [] Tears/Excoriation/Abrasion  []Edema [] Other: Wt Readings from Last 30 Encounters:  
05/20/19 83.4 kg (183 lb 13.8 oz) 02/07/17 68.9 kg (152 lb) NUTRITION DIAGNOSES:  
Problem:  Inadequate energy intake Etiology: related to decreased ability to consume sufficient energy Signs/Symptoms: as evidenced by PO intakes < EEN's since admission NUTRITION INTERVENTIONS: 
Meals/Snacks: General/healthful diet   Supplements: Commercial supplement GOAL:  
Consume > 75% meals + ONS within next 1-3 days Cultural, Cheondoism, or Ethnic Dietary Needs: None EDUCATION & DISCHARGE NEEDS:  
 [x] None Identified 
 [] Identified and Education Provided/Documented 
 [] Identified and Pt declined/was not appropriate [x] Interdisciplinary Care Plan Reviewed/Documented  
 [x] Discharge Needs:    Continue diet per SLP rec's at home with ONS 2-3x/day if intakes remain < 50% [] No Nutrition Related Discharge Needs NUTRITION RISK:  
Pt Is At Nutrition Risk  [] No Nutrition Risk Identified  [] PT SEEN FOR:  
 []  MD Consult: []Calorie Count []Diabetic Diet Education []Diet Education []Electrolyte Management []General Nutrition Management and Supplements []Management of Tube Feeding []TPN Recommendations []  RN Referral:  []MST score >=2 
   []Enteral/Parenteral Nutrition PTA []Pregnant: Gestational DM or Multigestation  
              [] Pressure Ulcer 
 
[]  Low BMI      []  Length of Stay       [] Dysphagia Diet         [] Ventilator [x]  Follow-up Previous Recommendations: 
 [x] Implemented          [] Not Implemented          [] Not Applicable Previous Goal: 
 [x] Met              [] Progressing Towards Goal              [] Not Progressing Towards Goal   [] Not Applicable Slick Gallegos RD Pager [phone] Phone [phone]

## 2019-05-20 NOTE — PROGRESS NOTES
Kin [facility] 
[address] [facility], [address] [place] [address] 
[phone] Medical Progress Note NAME: Cassandra Medina :  1950 MRM:  [id number] Date/Time: 2019 Assessment / Plan: Dysphagia / Quadriplegia / S/P  shunt / Flexion contractures / Sacral wound - POA, severe, chronic. Dysphagia is new or worse than baseline. Appreciate speech. Failed MBS repeatedly. Now with Dobhoff for feedings. Plan for PEG placement. Appreciate GI. Continue baclofen, oxybutynin, PPI, laxatives 
  
Nephrolithiasis / Hydronephrosis / Hematuria - POA and underlying source of sepsis. Appreciate urology input, and did successful cystoscopy with bilateral stents and stone removal . Follow BMP 
  
UTI (urinary tract infection), complicated, due to indwelling arias catheter / Severe sepsis / Bacteremia - Sepsis POA, urosepsis due to stone and underlying chronic arias. Ur Cx with E coli and Proteus, blood cx with 2 species Proteus. Continue IV ceftriaxone @ 2gm / day. Needs total 14 days tx, can change to Levaquin if needed.  
  
Anemia / Thrombocytopenia: PLT count normalized. Watch Hg. Suspect at least component of ACD 
  
 
 
Total time spent: 35 minutes, d/w speech Time spent in the care of this patient including reviewing records, discussing with nursing and/or other providers on the treatment team, obtaining history and examining the patient, and discussing treatment plans. Care Plan discussed with: Patient, Nursing Staff and >50% of time spent in counseling and coordination of care Discussed:  Care Plan and D/C Planning Prophylaxis:  Hep SQ Disposition:  Home w/Family and HH PT, OT, RN Subjective: Chief Complaint:  Follow up dysphagia Chart/notes/labs/studies reviewed, patient examined at bedside. Weak. History limited due to speech and mental status. No fevers Objective:  
 
 
Vitals: Last 24hrs VS reviewed since prior progress note. Most recent are: 
 
Visit Vitals /73 (BP 1 Location: Left arm, BP Patient Position: At rest;Supine) Pulse 85 Temp 98 °F (36.7 °C) Resp 17 Ht 6' (1.829 m) Wt 83.4 kg (183 lb 13.8 oz) SpO2 96% BMI 24.94 kg/m² SpO2 Readings from Last 6 Encounters:  
05/20/19 96% 05/10/19 97% 04/30/19 98% 04/26/19 98% 04/19/19 98% 04/09/19 98% O2 Flow Rate (L/min): 2 l/min Intake/Output Summary (Last 24 hours) at 5/20/2019 1814 Last data filed at 5/20/2019 3972 Gross per 24 hour Intake 1375 ml Output 2975 ml Net -1600 ml Exam:  
 
Physical Exam: 
 
Gen: Elderly, frail, chronically ill-appearing. NAD HEENT:  Pink conjunctivae, hearing intact to voice, moist mucous membranes Neck:  Supple, without masses Resp:  No accessory muscle use, clear breath sounds without wheezes rales or rhonchi 
Card:  No murmurs, normal S1, S2 without thrills, bruits or peripheral edema Abd:  Soft, non-tender, non-distended, reduced bowel sounds are present, no mass Musc:  No cyanosis or clubbing Skin:  No rashes or ulcers, skin turgor is good Neuro:  Cranial nerves are grossly intact, complete motor weakness, follows commands poorly Psych:  Poor insight, oriented to person, place Medications Reviewed: (see below) Lab Data Reviewed: (see below) 
 
______________________________________________________________________ Medications:  
 
Current Facility-Administered Medications Medication Dose Route Frequency  [START ON 5/21/2019] pantoprazole (PROTONIX) 40 mg in sodium chloride 0.9% 10 mL injection  40 mg IntraVENous DAILY  baclofen (LIORESAL) tablet 2.5 mg  2.5 mg Oral TID  oxybutynin (DITROPAN) tablet 5 mg  5 mg Oral TID  zinc oxide-cod liver oil (DESITIN) 40 % paste   Topical DAILY  white petrolatum-mineral oil (EUCERIN) cream   Topical DAILY  bisacodyl (DULCOLAX) suppository 10 mg  10 mg Rectal DAILY PRN  
  cefTRIAXone (ROCEPHIN) 2 g in 0.9% sodium chloride (MBP/ADV) 50 mL  2 g IntraVENous Q24H  
 sodium chloride (NS) flush 5-10 mL  5-10 mL IntraVENous PRN  
 sodium chloride (NS) flush 5-40 mL  5-40 mL IntraVENous Q8H  
 sodium chloride (NS) flush 5-40 mL  5-40 mL IntraVENous PRN  
 acetaminophen (TYLENOL) tablet 650 mg  650 mg Oral Q4H PRN  
 naloxone (NARCAN) injection 0.4 mg  0.4 mg IntraVENous PRN  
 ondansetron (ZOFRAN) injection 4 mg  4 mg IntraVENous Q4H PRN  
 menthol-zinc oxide (CALMOSEPTINE) 0.44-20.6 % ointment 1 Each  1 Each Topical DAILY PRN  
 traMADol (ULTRAM) tablet 75 mg  75 mg Oral Q6H PRN  
 heparin (porcine) injection 5,000 Units  5,000 Units SubCUTAneous Q8H Lab Review:  
 
Recent Labs  
  05/20/19 
0013 05/19/19 
1110 WBC 9.3 8.9 HGB 8.6* 8.3* HCT 27.5* 25.5*  
 247 Recent Labs  
  05/20/19 
2737 05/19/19 
1110 05/18/19 
0449  139 137  
K 4.3 3.7 3.8  107 107 CO2 25 27 28 * 134* 155* BUN 9 8 7 CREA 0.62* 0.69* 0.55* CA 7.5* 7.6* 7.5* MG 2.2 2.1 2.1 PHOS 3.4 3.7 2.8 ALB 2.0* 1.9* 1.8* SGOT 14* 15 17 ALT 25 32 39 No components found for: [place] No results for input(s): PH, PCO2, PO2, HCO3, FIO2 in the last 72 hours. No results for input(s): INR in the last 72 hours. No lab exists for component: INREXT Lab Results Component Value Date/Time Specimen Description: URINE 05/25/2009 05:30 PM  
 
Lab Results Component Value Date/Time Culture result: NO GROWTH 6 DAYS 05/12/2019 09:29 AM  
 Culture result: PROTEUS MIRABILIS (PREDOMINATING) (A) 05/11/2019 11:37 AM  
 Culture result: ESCHERICHIA COLI (A) 05/11/2019 11:37 AM  
 Culture result: (A) 05/11/2019 11:37 AM  
  PROTEUS MIRABILIS ISOLATED FROM 3 OF 4 BOTTLES DRAWN. ..CL WHITE AND CL BLUE  Culture result: (A) 05/11/2019 11:37 AM  
  PRELIMINARY REPORT OF GRAM NEGATIVE RODS GROWING IN 3 OF 4 BOTTLES DRAWN CALLED TO AND READ BACK BY GIA KELLEY,NALINI AT 7635 05.12.2019 LAS Culture result: REMAINING BOTTLE(S) HAS/HAVE NO GROWTH IN 5 DAYS 05/11/2019 11:37 AM  
 
        
___________________________________________________ Attending Physician: Razia Carr MD

## 2019-05-20 NOTE — PROGRESS NOTES
Home Based [address] [facility] [address]  [phone]    RN Nursing Visit Note    Date of Visit: 05/20/19    Diagnosis:  Sepsis, dysphasia      [facility] nurse visit made with patient and patient's daughter at [facility] in room 536. Patient going to have Barium swallow test today. Possible PEG tube placement tomorrow. [facility] nurse introduced to  Samreen Warren. Provided Sukumar with [place] for GIL to home.

## 2019-05-20 NOTE — ROUTINE PROCESS
Bedside shift change report given to Dalia Moore (oncoming nurse) by Ag Abbott (offgoing nurse). Report included the following information SBAR, Kardex, Procedure Summary, Intake/Output, MAR, Accordion, Recent Results and Med Rec Status.

## 2019-05-20 NOTE — PROGRESS NOTES
Palliative Medicine Consult Steve: [phone] Patient Name: Jack Keating YOB: 1950 Date of Initial Consult: 05/17/2019 Reason for Consult: Care decisions Requesting Provider: Agnieszka Gonzalez MD  
Primary Care Physician: Lizabeth Gómez NP 
 
 SUMMARY:  
Jack Keating is a 79 y.o. with a past history of brainstem injury, quadriplegia with chronic arias,  shunt, kidney stones, and frequent UTI, who was admitted on 5/11/2019 from home with a diagnosis of severe sepsis. Patient presented to the ED with complaints of fever, hematuria, and penile pain. ED eval revealed fever, hypotension,leukocytosis and lactic acidosis. CT scan revealed bilateral hydronephrosis and hydroureter and thickened urinary bladder wall with small stones in the bladder. Patient admitted and started on broad spectrum antibiotics and vasopressor support. With treatment of sepsis, vasopressor support weaned off and patient stabilized. Urology consulted patient underwent cystoscopy with bilateral retrograde pyelogram and bladder biopsy this am. This showed small bladder stones - likely from previous catheter encrustation, edematous bladder mucosa, ureteral orifices patent - no evidence of ureteral stricture or obstruction, bilateral ureterohydronephrosis R>L, prompt drainage of both collecting systems. Speech eval done and patient found to have aspiration in mild-moderate amounts due to delay in swallow and weakness. Due to weakness, he has residue after the swallow, which he aspirated posteriorly and silently. Cough is poor in strength. Due to persistent moderate amounts of silent aspiration with weak cough, prognosis for quick swallow recovery is poor. Current medical issues leading to Palliative Medicine involvement include: Care decisions. SH: , daughter lives with them. Sister lives in [place] and brother lives in [place]. Interim History: 05/20--> MBS repeated this am and failed to show improvement PALLIATIVE DIAGNOSES:  
1. Dysphagia 2. Physical debility 3. Goals of care 4. ACP/DNR review PLAN:  
1. Met with patient and daughter Jose Roberto Harman in follow-up. 2. Patient alert and oriented, resting comfortably in bed. No pain or shortness of breath. 3. Goals of care-- Reviewed events of the weekend as well as barium swallow results. Patient and daughter understand that he is aspirating on all liquids and not safe for p.o., both express desire to move towards PEG placement with hope of patient returning home as soon as possible. Both optimistic that with time and ongoing SLP his swallow will improve. I explained that given overall debility, this could be patient's new baseline. Jose Roberto Harman acknowledges this. Will continue to follow. 4. ACP--> Patient has AMD on file that appoints daughter, Jose Roberto Harman, as sole agent for healthcare decisions. Patient also has completed DDNR on file. 5. Initial consult note routed to primary continuity provider and/or primary health care team members 6. Communicated plan of care with: Palliative Ayanna LUNDY 192 Team 
 
 GOALS OF CARE / TREATMENT PREFERENCES:  
 
GOALS OF CARE: 
Patient/Health Care Proxy Stated Goals: (Full restorative measures in an effort to recover) TREATMENT PREFERENCES:  
Code Status: DNR Advance Care Planning: 
[x] [facility] Interdisciplinary Team has updated the ACP Navigator with [place] and Patient Capacity Primary Decision Maker: Bruno Burnette - [phone] Advance Care Planning 5/11/2019 Patient's Healthcare Decision Maker is: Named in scanned ACP document Confirm Advance Directive Yes, on file Does the patient have other document types Power of [place]; Do Not Resuscitate Medical Interventions: Limited additional interventions Other Instructions: Artificially Administered Nutrition: Feeding tube long-term, if indicated Other: As far as possible, the palliative care team has discussed with patient / health care proxy about goals of care / treatment preferences for patient. HISTORY:  
 
History obtained from: chart, daughter CHIEF COMPLAINT: fever, hematuria, and penile pain HPI/SUBJECTIVE: The patient is:  
[] Verbal and participatory [x] Non-participatory due to:  Medical condition/brainstem injury Patient presented to the ED with complaints of fever, hematuria, and penile pain. ED eval revealed fever, hypotension,leukocytosis and lactic acidosis. CT scan revealed bilateral hydronephrosis and hydroureter and thickened urinary bladder wall with small stones in the bladder. Patient admitted and started on broad spectrum antibiotics and vasopressor support. With treatment of sepsis, vasopressor support weaned off and patient stabilized. Clinical Pain Assessment (nonverbal scale for severity on nonverbal patients):  
Clinical Pain Assessment Severity: 0 Activity (Movement): Lying quietly, normal position Duration: for how long has pt been experiencing pain (e.g., 2 days, 1 month, years) Frequency: how often pain is an issue (e.g., several times per day, once every few days, constant) FUNCTIONAL ASSESSMENT:  
 
Palliative Performance Scale (PPS): PPS: 30 
 
 
 PSYCHOSOCIAL/SPIRITUAL SCREENING:  
 
Palliative IDT has assessed this patient for cultural preferences / practices and a referral made as appropriate to needs (Cultural Services, Patient Advocacy, Ethics, etc.) Any spiritual / Uatsdin concerns: 
[] Yes /  [x] No 
 
Caregiver Burnout: 
[] Yes /  [x] No /  [] No Caregiver Present Anticipatory grief assessment:  
[x] Normal  / [] Maladaptive ESAS Anxiety: Anxiety: 0 
 
ESAS Depression:    
 
 
 REVIEW OF SYSTEMS:  
 
Positive and pertinent negative findings in ROS are noted above in HPI. The following systems were [x] reviewed / [] unable to be reviewed as noted in HPI Other findings are noted below. Systems: constitutional, ears/nose/mouth/throat, respiratory, gastrointestinal, genitourinary, musculoskeletal, integumentary, neurologic, psychiatric, endocrine. Positive findings noted below. Modified ESAS Completed by: provider Fatigue: 0 Drowsiness: 0 Pain: 0 Anxiety: 0 Nausea: 0 Dyspnea: 0 Constipation: No  
  Stool Occurrence(s): 1 PHYSICAL EXAM:  
 
From RN flowsheet: 
Wt Readings from Last 3 Encounters:  
05/21/19 181 lb (82.1 kg) 02/07/17 152 lb (68.9 kg) Blood pressure 144/73, pulse 80, temperature 97.6 °F (36.4 °C), resp. rate 18, height 6' (1.829 m), weight 181 lb (82.1 kg), SpO2 95 %. Pain Scale 1: FLACC Pain Intensity 1: 0 Last bowel movement, if known:  
 
Constitutional: NAD Eyes: pupils equal, anicteric ENMT: no nasal discharge, moist mucous membranes, dobhoff in place Cardiovascular: regular rhythm, distal pulses intact Respiratory: breathing not labored, symmetric Gastrointestinal: soft non-tender, +bowel sounds Musculoskeletal: no tenderness to palpation Skin: warm, dry Neurologic: following commands Psychiatric: full affect, no hallucinations Other: 
 
 
 HISTORY:  
 
Principal Problem: 
  Severe sepsis ([address] (5/11/2019) Active Problems: 
  Quadriplegia ([address] (2/4/2019) Flexion contractures (2/4/2019) Sacral wound (2/4/2019) Nephrolithiasis (2/4/2019) Hepatitis C virus infection cured after antiviral drug therapy (3/10/2019) Hydronephrosis (5/11/2019) S/P  shunt (5/11/2019) UTI (urinary tract infection) (5/11/2019) DNR (do not resuscitate) (5/11/2019) Hematuria (5/11/2019) Anemia (5/16/2019) Dysphagia (5/16/2019) Hypokalemia (5/16/2019) Goals of care, counseling/discussion () 
 
  ACP (advance care planning) () Past Medical History: Diagnosis Date  Disorder of brainstem Bruised brainstem  Double vision  Frequent UTI  History of vascular access device 05/17/2019 [facility] 4Fr Midline 03BG Right Basilic for poor access  Kidney stones  Quadriplegia ([address] Past Surgical History:  
Procedure Laterality Date  HX CSF SHUNT  HX CSF SHUNT  HX ORTHOPAEDIC Left  HX OTHER SURGICAL    
 bladder stone History reviewed. No pertinent family history. History reviewed, no pertinent family history. Social History Tobacco Use  Smoking status: Former Smoker  Smokeless tobacco: Never Used Substance Use Topics  Alcohol use: No  
 
No Known Allergies Current Facility-Administered Medications Medication Dose Route Frequency  pantoprazole (PROTONIX) 40 mg in sodium chloride 0.9% 10 mL injection  40 mg IntraVENous DAILY  baclofen (LIORESAL) tablet 2.5 mg  2.5 mg Oral TID  oxybutynin (DITROPAN) tablet 5 mg  5 mg Oral TID  zinc oxide-cod liver oil (DESITIN) 40 % paste   Topical DAILY  white petrolatum-mineral oil (EUCERIN) cream   Topical DAILY  bisacodyl (DULCOLAX) suppository 10 mg  10 mg Rectal DAILY PRN  
 cefTRIAXone (ROCEPHIN) 2 g in 0.9% sodium chloride (MBP/ADV) 50 mL  2 g IntraVENous Q24H  
 sodium chloride (NS) flush 5-10 mL  5-10 mL IntraVENous PRN  
 sodium chloride (NS) flush 5-40 mL  5-40 mL IntraVENous Q8H  
 sodium chloride (NS) flush 5-40 mL  5-40 mL IntraVENous PRN  
 acetaminophen (TYLENOL) tablet 650 mg  650 mg Oral Q4H PRN  
 naloxone (NARCAN) injection 0.4 mg  0.4 mg IntraVENous PRN  
 ondansetron (ZOFRAN) injection 4 mg  4 mg IntraVENous Q4H PRN  
 menthol-zinc oxide (CALMOSEPTINE) 0.44-20.6 % ointment 1 Each  1 Each Topical DAILY PRN  
 traMADol (ULTRAM) tablet 75 mg  75 mg Oral Q6H PRN  
 heparin (porcine) injection 5,000 Units  5,000 Units SubCUTAneous Q8H  
 
 
 
 LAB AND IMAGING FINDINGS:  
 
Lab Results Component Value Date/Time WBC 9.3 05/20/2019 12:13 AM  
 HGB 8.6 (L) 05/20/2019 12:13 AM  
 PLATELET 407 43/03/6603 12:13 AM  
 
Lab Results Component Value Date/Time Sodium 137 05/20/2019 06:14 AM  
 Potassium 4.3 05/20/2019 06:14 AM  
 Chloride 107 05/20/2019 06:14 AM  
 CO2 25 05/20/2019 06:14 AM  
 BUN 9 05/20/2019 06:14 AM  
 Creatinine 0.62 (L) 05/20/2019 06:14 AM  
 Calcium 7.5 (L) 05/20/2019 06:14 AM  
 Magnesium 2.2 05/20/2019 06:14 AM  
 Phosphorus 3.4 05/20/2019 06:14 AM  
  
Lab Results Component Value Date/Time AST (SGOT) 14 (L) 05/20/2019 06:14 AM  
 Alk. phosphatase 85 05/20/2019 06:14 AM  
 Protein, total 6.9 05/20/2019 06:14 AM  
 Albumin 2.0 (L) 05/20/2019 06:14 AM  
 Globulin 4.9 (H) 05/20/2019 06:14 AM  
 
No results found for: INR, PTMR, PTP, PT1, PT2, APTT Lab Results Component Value Date/Time Iron 22 (L) 05/16/2019 02:30 PM  
 TIBC 175 (L) 05/16/2019 02:30 PM  
 Iron % saturation 13 (L) 05/16/2019 02:30 PM  
 Ferritin 213 05/16/2019 02:30 PM  
  
Lab Results Component Value Date/Time pH 7.50 (H) 05/25/2009 05:09 PM  
 PCO2 32 (L) 05/25/2009 05:09 PM  
 PO2 78 (L) 05/25/2009 05:09 PM  
 
No components found for: [place] No results found for: CPK, CKMB Total time: 70 min Counseling / coordination time, spent as noted above: 50 min 
> 50% counseling / coordination?: yes Prolonged service was provided for  []30 min   []75 min in face to face time in the presence of the patient, spent as noted above. Time Start:  
Time End:  
Note: this can only be billed with 07319 (initial) or 27752 (follow up). If multiple start / stop times, list each separately.

## 2019-05-20 NOTE — CONSULTS
[facility]. [facility] Courtney NP 
[phone] GASTROENTEROLOGY CONSULTATION NOTE   
 
 
 
 
NAME:  Edinson Palafox :   1950 MRN:   [id number] Referring Physician:  
Dr. Latasha Cole Consult Date:  
2019 4:42 PM 
 
Chief Complaint:   
Dysphagia History of Present Illness:   
Patient is a 79 y.o. who we were asked to see in consultation for the above complaints. The patient is currently hospitalized for treatment of Nephrolithiasis and UTI. He has a history notable for quadriplegia and is status post  shunt. Pt has difficulty speaking so much of this HPI has been obtained from his daughter. At home prior to his hospitalization he had been able to tolerate solid foods but had been having dysphagia with water. He has been seen by Speech Therapy here and has failed three MBS. It was recommended he have a more permanent means of nutrition while he continues to work with Speech Therapy. PMH: 
Past Medical History:  
Diagnosis Date  Disorder of brainstem Bruised brainstem  Double vision  Frequent UTI  History of vascular access device 2019 [facility] 4Fr Midline 91EB Right Basilic for poor access  Kidney stones  Quadriplegia ([facility] PSH: 
Past Surgical History:  
Procedure Laterality Date  HX CSF SHUNT  HX CSF SHUNT  HX ORTHOPAEDIC Left  HX OTHER SURGICAL    
 bladder stone Allergies: 
No Known Allergies Home Medications: 
Prior to Admission Medications Prescriptions Last Dose Informant Patient Reported? Taking? Menthol-Zinc Oxide (CALMOSEPTINE) 0.44-20.6 % oint  Child Yes Yes Sig: Apply 1 Each to affected area daily as needed. apply to back and buttocks daily and as needed for soiling  
baclofen (LIORESAL) 10 mg tablet 5/10/2019 Child No Yes Sig: TAKE 1 TABLET BY MOUTH THREE TIMES DAILY  
bisacodyl (DULCOLAX) 10 mg suppository 5/3/2019 Child Yes Yes Sig: Insert 10 mg into rectum two (2) times a week. Tuesday and Friday  
ergocalciferol (ERGOCALCIFEROL) 50,000 unit capsule 5/9/2019 Child Yes Yes Sig: Take 50,000 Units by mouth Every Thursday. miconazole (ZEASORB AF) 2 % topical powder  Child Yes Yes Sig: Apply  to affected area two (2) times daily as needed. oxybutynin (DITROPAN) 5 mg tablet 5/10/2019 Child No Yes Sig: TAKE 1 TABLET BY MOUTH THREE TIMES DAILY  
traMADol (ULTRAM) 50 mg tablet 5/3/2019 Child Yes Yes Sig: Take 75 mg by mouth every Tuesday and Friday. Facility-Administered Medications: None Hospital Medications: 
Current Facility-Administered Medications Medication Dose Route Frequency  [START ON 5/21/2019] pantoprazole (PROTONIX) 40 mg in sodium chloride 0.9% 10 mL injection  40 mg IntraVENous DAILY  baclofen (LIORESAL) tablet 2.5 mg  2.5 mg Oral TID  oxybutynin (DITROPAN) tablet 5 mg  5 mg Oral TID  zinc oxide-cod liver oil (DESITIN) 40 % paste   Topical DAILY  white petrolatum-mineral oil (EUCERIN) cream   Topical DAILY  bisacodyl (DULCOLAX) suppository 10 mg  10 mg Rectal DAILY PRN  
 cefTRIAXone (ROCEPHIN) 2 g in 0.9% sodium chloride (MBP/ADV) 50 mL  2 g IntraVENous Q24H  
 sodium chloride (NS) flush 5-10 mL  5-10 mL IntraVENous PRN  
 sodium chloride (NS) flush 5-40 mL  5-40 mL IntraVENous Q8H  
 sodium chloride (NS) flush 5-40 mL  5-40 mL IntraVENous PRN  
 acetaminophen (TYLENOL) tablet 650 mg  650 mg Oral Q4H PRN  
 naloxone (NARCAN) injection 0.4 mg  0.4 mg IntraVENous PRN  
 ondansetron (ZOFRAN) injection 4 mg  4 mg IntraVENous Q4H PRN  
 menthol-zinc oxide (CALMOSEPTINE) 0.44-20.6 % ointment 1 Each  1 Each Topical DAILY PRN  
 traMADol (ULTRAM) tablet 75 mg  75 mg Oral Q6H PRN  
 heparin (porcine) injection 5,000 Units  5,000 Units SubCUTAneous Q8H Social History: 
Social History Tobacco Use  Smoking status: Former Smoker  Smokeless tobacco: Never Used Substance Use Topics  Alcohol use: No  
 
 
Family History: 
History reviewed. No pertinent family history. Review of Systems: 
Constitutional: negative fever, negative chills, negative weight loss Eyes:   negative visual changes ENT:   negative sore throat, tongue or lip swelling Respiratory:  negative cough, negative dyspnea Cards:  negative for chest pain, palpitations, lower extremity edema GI:   See HPI 
:  negative for frequency, dysuria Integument:  negative for rash and pruritus Heme:  negative for easy bruising and gum/nose bleeding Musculoskel: negative for myalgias,  back pain and muscle weakness Neuro: negative for headaches, dizziness, vertigo Psych:  negative for feelings of anxiety, depression Objective:  
 
Patient Vitals for the past 8 hrs: 
 BP Temp Pulse Resp SpO2 Weight 05/20/19 1237 (!) 128/92 98 °F (36.7 °C) 81 18 97 %   
05/20/19 1151      83.4 kg (183 lb 13.8 oz) 05/20 0701 - 05/20 1900 In: 0 Out: 925 [Urine:925] 05/18 1901 - 05/20 0700 In: 1930 [I.V.:250] Out: 5250 [ODHML:9738] EXAM:   
 NEURO-alert, oriented x3, affect appropriate HEENT-Head: Normocephalic, no lesions, without obvious abnormality. LUNGS-clear to auscultation bilaterally COR-regular rate and rhythym ABD- soft, non-tender. Bowel sounds normal. No masses,  no organomegaly EXT-no edema Skin - No rash Data Review Recent Labs  
  05/20/19 
0013 05/19/19 
1110 WBC 9.3 8.9 HGB 8.6* 8.3* HCT 27.5* 25.5*  
 247 Recent Labs  
  05/20/19 
0614 05/19/19 
1110  139  
K 4.3 3.7  107 CO2 25 27 BUN 9 8  
CREA 0.62* 0.69* * 134* PHOS 3.4 3.7 CA 7.5* 7.6* Recent Labs  
  05/20/19 
0614 05/19/19 
1110 SGOT 14* 15  
AP 85 88  
TP 6.9 6.4 ALB 2.0* 1.9*  
GLOB 4.9* 4.5* No results for input(s): INR, PTP, APTT in the last 72 hours. No lab exists for component: INREXT Patient Active Problem List  
 Diagnosis Code  Quadriplegia ([facility] G82.50  Flexion contractures M24.50  Sacral wound S31.000A  Nephrolithiasis N20.0  Hepatitis C virus infection cured after antiviral drug therapy Z86.19  Severe sepsis (H[facility]) A41.9, R65.20  Hydronephrosis N13.30  
 S/P  shunt Z98.2  
 UTI (urinary tract infection) N39.0  DNR (do not resuscitate) [phone]  Hematuria R31.9  Anemia D64.9  Dysphagia R13.10  Hypokalemia E87.6  Goals of care, counseling/discussion Z71.89  
 ACP (advance care planning) Z71.89 Assessment and Plan: Dysphagia: 
- Continue NGT feeds per nutrition recommendations 
- NPO at midnight for possible PEG pcmt tomorrow. May have to postpone until Wed or Thurs as endoscopy schedule is full at this time. Following. Thanks for allowing me to participate in the care of this patient.  
Signed By: Liliana Abraham NP   
 5/20/2019  4:42 PM

## 2019-05-20 NOTE — PROGRESS NOTES
SLP plans to see patient at bedside, but there is no room for MBS on schedule today. Will try for [facility] tomorrow.

## 2019-05-20 NOTE — ROUTINE PROCESS
Interdisciplinary team rounds were held 5/20/2019 with the following team members:Care Management, Nursing, Nutrition, Pharmacy, Physical Therapy and Physician. Plan of care discussed. See clinical pathway and/or care plan for interventions and desired outcomes. Plan: speech to see today. Possible plan for PEG placement.

## 2019-05-20 NOTE — PROGRESS NOTES
Bedside shift change report given to Himanshu Zhou RN by Sarah Robertson RN. Report included the following information SBAR, ED Summary, Intake/Output, MAR and Recent Results.

## 2019-05-21 NOTE — PROGRESS NOTES
Today's Updates/Plan 1. Referral sent to [facility] 2. Plan for PEG placement 3. CM will continue to follow Discharge plan 1. Home with [facility] 2. Follow-up with [facility] home based Primary Care Cordell Goetz, BS, [facility]

## 2019-05-21 NOTE — ROUTINE PROCESS
0102 - Feeding stopped for PEG tube placement tomorrow. 3929 - Heparin held this morning due to PEG insertion today.

## 2019-05-21 NOTE — ROUTINE PROCESS
Bedside and Verbal shift change report given to Cierra Messer RN (oncoming nurse) by Katerina Reynolds RN (offgoing nurse). Report included the following information SBAR, Kardex, ED Summary, Intake/Output, MAR and Recent Results.

## 2019-05-21 NOTE — ROUTINE PROCESS
Bedside shift change report given to [address] [place] [address] (oncoming nurse) by Abelino Enriquez (offgoing nurse). Report included the following information SBAR, Kardex, Procedure Summary, Intake/Output, MAR, Accordion, Recent Results and Med Rec Status.

## 2019-05-21 NOTE — PERIOP NOTES
Received Report From Jeffry Schroeder CRNA @ 5554, see anesthesia notes. Care of the patient transferred to procedure nurse [id number] [address] Out of Procedure and sent to post-recovery @ [phone] Post-recovery report given to Ann-Marie Moore RN @ 3806 Patient ABD remains soft and non-tender post procedure. Pt has no complaints at this time and tolerated the procedure well. Endoscope was pre-cleaned at bedside immediately following procedure by Bobby Munson.

## 2019-05-21 NOTE — PERIOP NOTES
Radha Moulton 1950 
[id number] Situation: 
 
Scheduled Procedure: Procedure(s): ESOPHAGOGASTRODUODENOSCOPY (EGD) PERCUTANEOUS ENDOSCOPIC GASTROSTOMY TUBE INSERTION Verbal report received from: NALINI Chisholm 
Preoperative diagnosis: dysphagia Background: 
 
Procedure: Procedure(s): ESOPHAGOGASTRODUODENOSCOPY (EGD) PERCUTANEOUS ENDOSCOPIC GASTROSTOMY TUBE INSERTION Physician performing procedure; Dr. Shahla Sweeney SBAR QUESTIONS FLOOR TO ENDO RN 
 
NPO Status/Last PO Intake: 2400 (tube feeding held) Pregnancy Test:Not applicable If yes, result: none Is the patient taking Blood Thinners: YES If yes, list: Heparin and last taken turned off @ 0530 Is the patient diabetic:no If yes, what was the last BS:    Time taken? Anything given? no          
Does the patient have a Pacemaker/Defibrillator in place?: no  
Does the patient need antibiotics before/during/after procedure: yes If the patient is having a colon, How much prep was drank? What were the Colon prep results? Does the patient have SCD in place:no Is patient on CONTACT precautions:no Assessment: 
Are the vital signs stable prior to patient coming to ENDO?  yes Is the patient alert/oriented and able to sign consent for the procedures:yes Does the patient have a patient IV in place? Yes (Midline) Recommendation: 
Family or Friend present yes Permission to share finding with Family or Friend yes

## 2019-05-21 NOTE — PERIOP NOTES
TRANSFER - IN REPORT: 
 
Verbal report received from [place], RN Clinical Coordinator,on Brodie Mason   for routine progression of care Report consisted of patients Situation, Background, Assessment and  
Recommendations(SBAR). Information from the following report(s) SBAR was reviewed with the receiving nurse. Opportunity for questions and clarification was provided. Assessment completed upon patients arrival to unit and care assumed.

## 2019-05-21 NOTE — PROCEDURES
[address] Gerhard Weaver M.D. 
[phone] 2019 EGD and PEG placement Operative Report Edinson Palafox :  1950 Yelitza Becker Record Number:  [id number] Procedure Type:   EGD and PEG tube --diagnostic Indications:    Dysphagia, high risk of aspiration Pre-operative Diagnosis: see indication above Post-operative Diagnosis:  See findings below :  Evelyne Armendariz MD 
 
Referring Provider: Michael Parsons NP Sedation:  MAC anesthesia Pre-Procedural Exam: Airway: clear,  No airway problems anticipated Heart: RRR, without gallops or rubs Lungs: clear bilaterally without wheezes, crackles, or rhonchi Abdomen: soft, nontender, nondistended, bowel sounds present Mental Status: awake, alert and oriented to person, place and time Prior to the procedure its objectives, risks, consequences and alternatives were discussed with the patient and his daughter who then elected to proceed. The patient's daughter had the opportunity to ask questions and those questions were answered. A physical exam was performed. The heart, lungs, and mental status were examined prior to the procedure and found to be satisfactory for MAC and for the procedure. MAC was initiaed by the CRNA. Continuous pulse oximetry and blood pressure monitoring were used throughout the procedure. After appropriate pharyngeal anesthesia, the endoscope was passed into the esophagus without difficulty. The proximal esophagus is normal as is the distal esophagus. The fundus, body, antrum, pylorus, bulb and postbulbar area are unremarkable. On slow withdrawal of the scope, following the usual fashion of transillumination and finger indentation into the abdominal wall, an optimal site at the skin was identified. The skin was then prepped with betadine.  Under sterile conditions, 1% Xylocaine was injected at the chosen site and across the abdominal wall. The needle was easily seen passing into the gastric lumen. At this point, a small incision was made at the skin site, followed by passage of a trocar through the incision. The trocar passed into the gastric lumen and served to pass a guidewire under direct vision into the stomach. The wire was snared and brought along with the scope out from the mouth. The PEG tube was passed over the wire and brought into the stomach and out of the abdominal wall without difficulty. The scope was then reinserted and the positioning of the PEG tube was excellent. The incision site was then cleaned and betadine ointment applied. The patient tolerated the procedure without complication and will return to the room in satisfactory condition. Specimen Removed:  none Complications: None. Impression:    Upper endoscopy within normal. Successful placement of 20 Fr PEG tube. Recommendations: - Monitor for any signs of bleeding 
- Start tube feedings in 4 hours - Resume heparin tomorrow Signed By: Tc Tipton MD   
 5/21/2019  3:39 PM

## 2019-05-21 NOTE — ANESTHESIA POSTPROCEDURE EVALUATION
Procedure(s): ESOPHAGOGASTRODUODENOSCOPY (EGD) PERCUTANEOUS ENDOSCOPIC GASTROSTOMY TUBE INSERTION. MAC Anesthesia Post Evaluation Patient location during evaluation: bedside Level of consciousness: awake Pain management: satisfactory to patient Airway patency: patent Anesthetic complications: no 
Cardiovascular status: acceptable Respiratory status: acceptable Hydration status: acceptable Vitals Value Taken Time /59 5/21/2019  3:59 PM  
Temp 36.7 °C (98 °F) 5/21/2019  3:44 PM  
Pulse 79 5/21/2019  4:01 PM  
Resp 17 5/21/2019  4:01 PM  
SpO2 98 % 5/21/2019  4:01 PM  
Vitals shown include unvalidated device data.

## 2019-05-21 NOTE — ANESTHESIA PREPROCEDURE EVALUATION
Anesthetic History No history of anesthetic complications Comments: H/o lico, decannulated in 1978, denies airway issues with any anesthetics Review of Systems / Medical History Patient summary reviewed, nursing notes reviewed and pertinent labs reviewed Pulmonary Within defined limits Neuro/Psych Neuromuscular disease Comments: Quadriplegia w/ full motor deficit in all extremities, sensation intact. Able to ventilate without support or O2 Cardiovascular Exercise tolerance: <4 METS Comments: ekg sinus tach GI/Hepatic/Renal 
  
 
 
Renal disease: stones Comments: Worsening Dysphagia, failed MBS receiving NGTube feeds Endo/Other Other Findings Comments: H/o nephrolithiasis; admitted 5/11 with Urosepsis, now afebrile off levophed Denies overnight events Hct 25.5 Physical Exam 
 
Airway Mallampati: III 
TM Distance: 4 - 6 cm Neck ROM: normal range of motion Mouth opening: Normal 
 
Comments: Retrognathic, appears anterior Cardiovascular Rhythm: regular Rate: normal 
 
 
 
 Dental 
 
Dentition: Shanice Ferro Comments: Upper bridge Pulmonary Breath sounds clear to auscultation Abdominal 
GI exam deferred Other Findings Anesthetic Plan ASA: 3 Anesthesia type: MAC Induction: Intravenous Anesthetic plan and risks discussed with: Patient

## 2019-05-21 NOTE — PROGRESS NOTES
[facility] 
[address] [facility], [address] [place] [address] 
[phone] Medical Progress Note NAME: Joann Colunga :  1950 MRM:  [id number] Date/Time: 2019 Assessment / Plan: Dysphagia / Quadriplegia / S/P  shunt / Flexion contractures / Sacral wound - POA, severe, chronic. Dysphagia is new or worse than baseline. Appreciate speech. Failed MBS repeatedly. Now with Dobhoff for feedings. Plan for PEG placement today. Continue baclofen, oxybutynin, PPI, laxatives. If tolerates tube feeds tonight after placement, plan to DC home tomorrow 
  
Nephrolithiasis / Hydronephrosis / Hematuria - POA and underlying source of sepsis. Appreciate urology input, and did successful cystoscopy with bilateral stents and stone removal . Monitor BMP 
  
UTI (urinary tract infection), complicated, due to indwelling arias catheter / Severe sepsis / Bacteremia - Sepsis POA, urosepsis due to stone and underlying chronic arias. Ur Cx with E coli and Proteus, blood cx with 2 species Proteus. Continue IV ceftriaxone @ 2gm / day. Needs total 14 days tx, can change to Levaquin at discharge to complete course 
  
Anemia / Thrombocytopenia: PLT count normalized. Watch Hg which is stable. Suspect at least a component of ACD 
  
Total time spent: 25 minutes, d/w daughter at bedside Time spent in the care of this patient including reviewing records, discussing with nursing and/or other providers on the treatment team, obtaining history and examining the patient, and discussing treatment plans. Care Plan discussed with: Patient, Nursing Staff and >50% of time spent in counseling and coordination of care Discussed:  Care Plan and D/C Planning Prophylaxis:  Hep SQ Disposition:  Home w/Family and HH PT, OT, RN Subjective: Chief Complaint:  Follow up dysphagia Chart/notes/labs/studies reviewed, patient examined at bedside. No fevers. History limited due to speech and mental status. Denies abd pain. Objective:  
 
 
Vitals:  
 
  
Last 24hrs VS reviewed since prior progress note. Most recent are: 
 
Visit Vitals /59 Pulse 90 Temp 99.3 °F (37.4 °C) Resp 19 Ht 6' (1.829 m) Wt 82.1 kg (181 lb) SpO2 95% BMI 24.55 kg/m² SpO2 Readings from Last 6 Encounters:  
05/21/19 95% 05/10/19 97% 04/30/19 98% 04/26/19 98% 04/19/19 98% 04/09/19 98% O2 Flow Rate (L/min): 2 l/min Intake/Output Summary (Last 24 hours) at 5/21/2019 1550 Last data filed at 5/21/2019 0127 Gross per 24 hour Intake 350 ml Output 2250 ml Net -1900 ml Exam:  
 
Physical Exam: 
 
Gen: Elderly, frail, chronically ill-appearing. NAD HEENT:  Pink conjunctivae, hearing intact to voice, moist mucous membranes Neck:  Supple, without masses Resp:  No accessory muscle use, clear breath sounds without wheezes rales or rhonchi 
Card:  No murmurs, normal S1, S2 without thrills, bruits or peripheral edema Abd:  Soft, non-tender, non-distended, reduced bowel sounds are present, no mass Musc:  No cyanosis or clubbing Skin:  No rashes or ulcers, skin turgor is good Neuro:  Cranial nerves are grossly intact, complete motor weakness, follows commands poorly. Poor speech Psych:  Poor insight, oriented to person, place Medications Reviewed: (see below) Lab Data Reviewed: (see below) 
 
______________________________________________________________________ Medications:  
 
Current Facility-Administered Medications Medication Dose Route Frequency  0.9% sodium chloride infusion  50 mL/hr IntraVENous CONTINUOUS  
 midazolam (VERSED) injection 0.25-5 mg  0.25-5 mg IntraVENous Multiple  naloxone (NARCAN) injection 0.4 mg  0.4 mg IntraVENous Multiple  flumazenil (ROMAZICON) 0.1 mg/mL injection 0.2 mg  0.2 mg IntraVENous Multiple  simethicone (MYLICON) 03DE/8.8KH oral drops 80 mg  1.2 mL Oral Multiple  atropine injection 0.4 mg  0.4 mg IntraVENous ONCE PRN  
 EPINEPHrine (ADRENALIN) 0.1 mg/mL syringe 1 mg  1 mg Endoscopically ONCE PRN  pantoprazole (PROTONIX) 40 mg in sodium chloride 0.9% 10 mL injection  40 mg IntraVENous DAILY  baclofen (LIORESAL) tablet 2.5 mg  2.5 mg Oral TID  oxybutynin (DITROPAN) tablet 5 mg  5 mg Oral TID  zinc oxide-cod liver oil (DESITIN) 40 % paste   Topical DAILY  white petrolatum-mineral oil (EUCERIN) cream   Topical DAILY  bisacodyl (DULCOLAX) suppository 10 mg  10 mg Rectal DAILY PRN  
 cefTRIAXone (ROCEPHIN) 2 g in 0.9% sodium chloride (MBP/ADV) 50 mL  2 g IntraVENous Q24H  
 sodium chloride (NS) flush 5-10 mL  5-10 mL IntraVENous PRN  
 sodium chloride (NS) flush 5-40 mL  5-40 mL IntraVENous Q8H  
 sodium chloride (NS) flush 5-40 mL  5-40 mL IntraVENous PRN  
 acetaminophen (TYLENOL) tablet 650 mg  650 mg Oral Q4H PRN  
 naloxone (NARCAN) injection 0.4 mg  0.4 mg IntraVENous PRN  
 ondansetron (ZOFRAN) injection 4 mg  4 mg IntraVENous Q4H PRN  
 menthol-zinc oxide (CALMOSEPTINE) 0.44-20.6 % ointment 1 Each  1 Each Topical DAILY PRN  
 traMADol (ULTRAM) tablet 75 mg  75 mg Oral Q6H PRN  
 heparin (porcine) injection 5,000 Units  5,000 Units SubCUTAneous Q8H Lab Review:  
 
Recent Labs  
  05/20/19 
0013 05/19/19 
1110 WBC 9.3 8.9 HGB 8.6* 8.3* HCT 27.5* 25.5*  
 247 Recent Labs  
  05/20/19 
0614 05/19/19 
1110  139  
K 4.3 3.7  107 CO2 25 27 * 134* BUN 9 8  
CREA 0.62* 0.69* CA 7.5* 7.6*  
MG 2.2 2.1 PHOS 3.4 3.7 ALB 2.0* 1.9*  
SGOT 14* 15 ALT 25 32 No components found for: [place] No results for input(s): PH, PCO2, PO2, HCO3, FIO2 in the last 72 hours. No results for input(s): INR in the last 72 hours. No lab exists for component: INREXT, INREXT Lab Results Component Value Date/Time Specimen Description: URINE 05/25/2009 05:30 PM  
 
Lab Results Component Value Date/Time Culture result: NO GROWTH 6 DAYS 05/12/2019 09:29 AM  
 Culture result: PROTEUS MIRABILIS (PREDOMINATING) (A) 05/11/2019 11:37 AM  
 Culture result: ESCHERICHIA COLI (A) 05/11/2019 11:37 AM  
 Culture result: (A) 05/11/2019 11:37 AM  
  PROTEUS MIRABILIS ISOLATED FROM 3 OF 4 BOTTLES DRAWN. ..CL WHITE AND CL BLUE Culture result: (A) 05/11/2019 11:37 AM  
  PRELIMINARY REPORT OF GRAM NEGATIVE RODS GROWING IN 3 OF 4 BOTTLES DRAWN CALLED TO AND READ BACK BY GIA KELLEY RN AT 1036 05.12.2019 LAS Culture result: REMAINING BOTTLE(S) HAS/HAVE NO GROWTH IN 5 DAYS 05/11/2019 11:37 AM  
 
        
___________________________________________________ Attending Physician: Jose Peterson MD

## 2019-05-21 NOTE — PERIOP NOTES
TRANSFER - OUT REPORT: 
 
Verbal report given to Phan RN (name) on Ana M Sandy  being transferred to [address] [place] [address] (unit) for routine progression of care Report consisted of patients Situation, Background, Assessment and  
Recommendations(SBAR). Information from the following report(s) SBAR, Procedure Summary and Recent Results was reviewed with the receiving nurse. Lines:    
 
Opportunity for questions and clarification was provided. Patient transported with: 
 [employer]

## 2019-05-21 NOTE — ROUTINE PROCESS
Farhat Bundy 1950 
[id number] Situation: 
Verbal report received from: Car Travis RN Procedure: Procedure(s): ESOPHAGOGASTRODUODENOSCOPY (EGD) PERCUTANEOUS ENDOSCOPIC GASTROSTOMY TUBE INSERTION Background: 
 
Preoperative diagnosis: dysphagia Postoperative diagnosis: * No post-op diagnosis entered * :  Dr. Bruno Bahena Assistant(s): Endoscopy Technician-1: Avery Ragland Endoscopy RN-1: Vianey Layne RN Specimens: * No specimens in log * H. Pylori  no Assessment: 
Intra-procedure medications Anesthesia gave intra-procedure sedation and medications, see anesthesia flow sheet yes Intravenous fluids: NS@ [facility] [place] Vital signs stable Abdominal assessment: round and soft Recommendation: 
Discharge patient per MD order. Return to floor Family or Friend Permission to share finding with family or friend yes

## 2019-05-21 NOTE — PROGRESS NOTES
Nutrition Assessment: 
 
RECOMMENDATIONS/INTERVENTION(S):  
After PEG placement- when able to start TF- 
 
TF REC's: Jevity 1.5 @ 55ml/hr with 160ml h20 flush q4h 
 
(Goal TF will provide 1980kcals, 84gm pro, 1963ml fluid - this will meet 99% kcal needs and 100% pro needs) Check PEG placement, TF start, weight. Monitor lytes, BM status, ASSESSMENT:  
5/21: Follow up. Pt to have PEG placed today. When able, restart TF via PEG with recs above once patent and usable. BG have been mildly elevated 119, 134, 155 mg/dL. Monitor. 5/20:  Noted pt failed 3rd MBS per SLP. If POC is for PEG, recommend starting TF soon after placement with goal rate of 55 mL/hr as pt has been receiving TF via NG tube and tolerating. Nonpitting edema 1+ all extremities. Last BM 5/19. Labs reviewed. 5/16:  New consult received for TF management. Pt with Dobbhoff with Jevity 1.5 running at goal of 55 mL/hr with flushes. No changes at this time. Monitor weight. Pt had MBS again today which he failed. Will reassess on Monday and discuss PEG placement if appropriate. 5/14: S/P SLP eval and MBS - recommending pt be NPO for 3 days. Plan is to have NGT placed and start TF. Rec's above. 5/13: 78 yo male admitted for sepsis. RD assessment for PU screen. PMhx: quadriplegia. Weight WNL per BMI per age. Daughter present at time of visit, provided all information for pt. States his weight has been stable at home, no change. Pt has a very good appetite at home, they do not use ONS at home because he has never needed them secondary to good PO. Starting 2 days PTA pt was c/o nausea and not wanting to eat much. No longer c/o nausea but the poor PO intake has continued since admission. Pt taking in < 50% meals. RN reports pt coughing and requested SLP consult - ordered and pt made NPO until eval.  Pt states he is willing to try supplements. Multiple wounds present. Labs reviewed.   Meds: Vit D, Kcl. 
 
 
 Diet Order: Regular 
% Eaten:   
No data found. Pertinent Medications: [x] Reviewed Labs: [x] Reviewed Anthropometrics: Height: 6' (182.9 cm) Weight: 82.1 kg (181 lb) IBW (%IBW):   ( ) UBW (%UBW):   (  %) BMI: Body mass index is 24.55 kg/m². This BMI is indicative of: 
 [] Underweight    [x] Normal    [] Overweight    []  Obesity    []  Extreme Obesity (BMI>40) Estimated Nutrition Needs (Based on): 1988 Kcals/day(BMR 1657 x AF 1.2) , 84 g(84-101gm (1-1.2gm/kg/d)) Protein Carbohydrate: At Least 130 g/day  Fluids: 1988 mL/day (1 ml/kcal) Last BM: 5/20   [x]Active     []Hyperactive  []Hypoactive       [] Absent   BS Skin:    [] Intact   [] Incision  [x] Breakdown - stage II to buttock, wound to sacrum and right knee  [x] DTI - buttocks  [] Tears/Excoriation/Abrasion  []Edema [] Other: Wt Readings from Last 30 Encounters:  
05/21/19 82.1 kg (181 lb) 02/07/17 68.9 kg (152 lb) NUTRITION DIAGNOSES:  
Problem:  Inadequate energy intake Etiology: related to decreased ability to consume sufficient energy Signs/Symptoms: as evidenced by PO intakes < EEN's since admission NUTRITION INTERVENTIONS: 
Meals/Snacks: General/healthful diet   Supplements: Commercial supplement GOAL:  
Consume > 75% meals + ONS within next 1-3 days Cultural, Anabaptist, or Ethnic Dietary Needs: None EDUCATION & DISCHARGE NEEDS:  
 [x] None Identified 
 [] Identified and Education Provided/Documented 
 [] Identified and Pt declined/was not appropriate [x] Interdisciplinary Care Plan Reviewed/Documented  
 [x] Discharge Needs:   TF- recs above 
 [] No Nutrition Related Discharge Needs NUTRITION RISK:  
Pt Is At Nutrition Risk  [x] No Nutrition Risk Identified  [] PT SEEN FOR:  
 []  MD Consult: []Calorie Count []Diabetic Diet Education []Diet Education []Electrolyte Management []General Nutrition Management and Supplements []Management of Tube Feeding []TPN Recommendations []  RN Referral:  []MST score >=2 
   []Enteral/Parenteral Nutrition PTA []Pregnant: Gestational DM or Multigestation  
              [] Pressure Ulcer 
 
[]  Low BMI      []  Length of Stay       [] Dysphagia Diet         [] Ventilator [x]  Follow-up Previous Recommendations: 
 [x] Implemented          [] Not Implemented          [] Not Applicable Previous Goal: 
 [x] Met              [] Progressing Towards Goal              [] Not Progressing Towards Goal   [] Not Applicable Zia Morrell RD Pager [phone] Phone [phone]

## 2019-05-22 NOTE — PROGRESS NOTES
Today's Updates/Plan 1. CM set [facility] under will call. 2. Pt to discharge home once able to tolerate tube feeds. 3. [facility] has been arranged. [facility] made aware of Pt being a sepsis bundle. 2nd IM letter signed and placed on chart. 4. Update: CM notified of order for feeding tube supplies and formula. CM sent order to [facility], CM was informed that Pt's insurance is out of network. CM sent orders to [employer]. CM was informed that company will need to review Pt's insurance and qualifications. CM notified of Pt needing suctioning machine at discharge for oral care and secretion management. Cm sent orders to [facility] for review. Awaiting response. Discharge plan 1. Home with family via [facility] transport. 2. Follow-up with [facility] primary home care 3. [facility] [address] 47, BS, [facility]

## 2019-05-22 NOTE — PROGRESS NOTES
Nutrition Assessment: 
 
RECOMMENDATIONS/INTERVENTION(S): PEG placed 5/21: Recommend transitioning to Bolus feeds TF REC's: Jevity 1.5 - Bolus  240 mL 5 times per day with 75 mL h20 flush before and after bolus. Additional bolus of 120 mL required to meet nutrition needs. Flush 60 mL after this bolus. (Goal TF provides 1980kcals, 84gm pro, 1813 ml fluid - this will meet 99% kcal needs and 100% pro needs) Check PEG placement, TF start, weight. Monitor lytes, BM status, ASSESSMENT:  
5/22: Recommend bolus TF in hospital for a day to monitor tolerance, BG, weight, and family ability to perform bolus feedings. , 119, 134 mg/dL. Silas 7.9. Na 133, dropped from 137 yesterday. Adjusted free water flush down slightly for Na dropping and pt will meet hydration needs with flush from medications. Started Bolus order for 1 bolus of 120 mL. If pt tolerates will order bolus of 240 mL after 3 hours. 5/21: Follow up. Pt to have PEG placed today. When able, restart TF via PEG with recs above once patent and usable. BG have been mildly elevated 119, 134, 155 mg/dL. Monitor. 5/20:  Noted pt failed 3rd MBS per SLP. If POC is for PEG, recommend starting TF soon after placement with goal rate of 55 mL/hr as pt has been receiving TF via NG tube and tolerating. Nonpitting edema 1+ all extremities. Last BM 5/19. Labs reviewed. 5/16:  New consult received for TF management. Pt with Dobbhoff with Jevity 1.5 running at goal of 55 mL/hr with flushes. No changes at this time. Monitor weight. Pt had MBS again today which he failed. Will reassess on Monday and discuss PEG placement if appropriate. 5/14: S/P SLP eval and MBS - recommending pt be NPO for 3 days. Plan is to have NGT placed and start TF. Rec's above. 5/13: 78 yo male admitted for sepsis. RD assessment for PU screen. PMhx: quadriplegia. Weight WNL per BMI per age.   Daughter present at time of visit, provided all information for pt. States his weight has been stable at home, no change. Pt has a very good appetite at home, they do not use ONS at home because he has never needed them secondary to good PO. Starting 2 days PTA pt was c/o nausea and not wanting to eat much. No longer c/o nausea but the poor PO intake has continued since admission. Pt taking in < 50% meals. RN reports pt coughing and requested SLP consult - ordered and pt made NPO until eval.  Pt states he is willing to try supplements. Multiple wounds present. Labs reviewed. Meds: Vit D, Kcl. Diet Order: Regular 
% Eaten:   
No data found. Pertinent Medications: [x] Reviewed Labs: [x] Reviewed Anthropometrics: Height: 6' (182.9 cm) Weight: 82.1 kg (181 lb) IBW (%IBW):   ( ) UBW (%UBW):   (  %) BMI: Body mass index is 24.55 kg/m². This BMI is indicative of: 
 [] Underweight    [x] Normal    [] Overweight    []  Obesity    []  Extreme Obesity (BMI>40) Estimated Nutrition Needs (Based on): 1988 Kcals/day(BMR 1657 x AF 1.2) , 84 g(84-101gm (1-1.2gm/kg/d)) Protein Carbohydrate: At Least 130 g/day  Fluids: 1988 mL/day (1 ml/kcal) Last BM: 5/21  [x]Active     []Hyperactive  []Hypoactive       [] Absent   BS Skin:    [] Intact   [] Incision  [x] Breakdown - stage II to buttock, wound to sacrum and right knee  [x] DTI - buttocks  [] Tears/Excoriation/Abrasion  []Edema [] Other: Wt Readings from Last 30 Encounters:  
05/21/19 82.1 kg (181 lb) 02/07/17 68.9 kg (152 lb) NUTRITION DIAGNOSES:  
Problem:  Inadequate energy intake Etiology: related to decreased ability to consume sufficient energy Signs/Symptoms: as evidenced by PO intakes < EEN's since admission NUTRITION INTERVENTIONS: 
Meals/Snacks: General/healthful diet   Supplements: Commercial supplement GOAL:  
Consume > 75% meals + ONS within next 1-3 days Cultural, Jew, or Ethnic Dietary Needs: None EDUCATION & DISCHARGE NEEDS:  
 [x] None Identified 
 [] Identified and Education Provided/Documented 
 [] Identified and Pt declined/was not appropriate [x] Interdisciplinary Care Plan Reviewed/Documented  
 [x] Discharge Needs:   TF- recs above 
 [] No Nutrition Related Discharge Needs NUTRITION RISK:  
Pt Is At Nutrition Risk  [x] No Nutrition Risk Identified  [] PT SEEN FOR:  
 []  MD Consult: []Calorie Count []Diabetic Diet Education []Diet Education []Electrolyte Management []General Nutrition Management and Supplements []Management of Tube Feeding []TPN Recommendations []  RN Referral:  []MST score >=2 
   []Enteral/Parenteral Nutrition PTA []Pregnant: Gestational DM or Multigestation  
              [] Pressure Ulcer 
 
[]  Low BMI      []  Length of Stay       [] Dysphagia Diet         [] Ventilator [x]  Follow-up Previous Recommendations: 
 [x] Implemented          [] Not Implemented          [] Not Applicable Previous Goal: 
 [x] Met              [] Progressing Towards Goal              [] Not Progressing Towards Goal   [] Not Applicable Esteban Dubon RD Pager [phone] Phone [phone]

## 2019-05-22 NOTE — PROGRESS NOTES
Pharmacist Discharge Medication Reconciliation Discharge Provider:  Dr. Sergio Kahn Discharge Medications: My Medications START taking these medications Instructions Each Dose to Equal Morning Noon Evening Bedtime  
levoFLOXacin 750 mg tablet Commonly known as:  Gil Schmitt Your last dose was: Your next dose is: Take 1 Tab by mouth daily for 4 days. 750 mg CONTINUE taking these medications Instructions Each Dose to Equal Morning Noon Evening Bedtime  
baclofen 10 mg tablet Commonly known as:  LIORESAL Your last dose was: Your next dose is: TAKE 1 TABLET BY MOUTH THREE TIMES DAILY 
   
  
  
  
  
bisacodyl 10 mg suppository Commonly known as:  DULCOLAX Your last dose was: Your next dose is: Insert 10 mg into rectum two (2) times a week. Tuesday and Friday 10 mg 
  
  
  
  
  
ergocalciferol 50,000 unit capsule Commonly known as:  ERGOCALCIFEROL Your last dose was: Your next dose is: Take 50,000 Units by mouth Every Thursday. 74803 Units 
  
  
  
  
  
menthol-zinc oxide 0.44-20.6 % Oint Commonly known as:  Pricilla Becker Your last dose was: Your next dose is:   
 
  
 Apply 1 Each to affected area daily as needed. apply to back and buttocks daily and as needed for soiling 1 Each 
  
  
  
  
  
oxybutynin 5 mg tablet Commonly known as:  WCJGEWLN Your last dose was: Your next dose is: TAKE 1 TABLET BY MOUTH THREE TIMES DAILY 
   
  
  
  
  
traMADol 50 mg tablet Commonly known as:  ULTRAM 
 
Your last dose was: Your next dose is: Take 75 mg by mouth every Tuesday and Friday. 75 mg 
  
  
  
  
  
ZEASORB AF 2 % topical powder Generic drug:  miconazole Your last dose was: Your next dose is:   
 
  
 Apply  to affected area two (2) times daily as needed. Where to Get Your Medications Information on where to get these meds will be given to you by the nurse or doctor. Ask your nurse or doctor about these medications 
levoFLOXacin 750 mg tablet The patient's chart, MAR, and AVS were reviewed by Javier Sloan, PharmD, BCPS Contact: [phone]

## 2019-05-22 NOTE — PROGRESS NOTES
Daughter educated on bolus tube feeding, water flushing and residual checks. Able to provide return demonstration with bolus feed. Patient tolerating feedings without nausea, vomiting or diarrhea.

## 2019-05-22 NOTE — PROGRESS NOTES
Spoke with TRACY HOLLAND about need for [place] at discharge. Spoke with patient and daughter about recommendation for [facility] S[facility] with MBS when deemed appropriate by [facility].

## 2019-05-22 NOTE — WOUND CARE
Wound care follow up for skin assessment, on [facility] surface, alert, no distress- seen with staff nurse and assist of PMT Immobile patient- quadriplegia. Assessment All skin folds and bony prominences assessed, turned with staff assistance. Sacral and across buttocks skin is thin, fragile with red and pink chronic discoloration, skin intact with scattered areas of partial thickness skin loss on the fatty areas of the buttock, improved from last WCN assessment, zinc in place. Redness is delayed blanching Heels and elbows intact. Right lower leg chronic red discoloration on the lateral side, stable present on admission. Treatment Repositioned in bed Heels floated- prevalon boots Recommendations/Plan Continue skin care and preventative measures in place. Turn, reposition every 2 hours as tolerated, float heels, place in prevalon boots. Incontinent care with comfort shields. Apply stoma powder to open areas and apply Zinc to all open areas, moisture barrier as needed. Will follow, reconsult as needed.  
Luis Ann

## 2019-05-22 NOTE — PROGRESS NOTES
[facility] 
[address] 
[phone] Medical Progress Note NAME: Leonardo Mishra :  1950 MRM:  [id number] Date/Time: 2019 Assessment / Plan: Dysphagia / Quadriplegia / S/P  shunt / Flexion contractures / Sacral wound - POA, severe, chronic. Dysphagia is new or worse than baseline. Appreciate speech. Failed MBS repeatedly. PEG placement yesterday, tolerating tube feeds. Continue baclofen, oxybutynin, PPI, laxatives. Stable for DC, however delayed due to insurance network coverage.   
 
Nephrolithiasis / Hydronephrosis / Hematuria - POA and underlying source of sepsis. Appreciate urology input, and did successful cystoscopy with bilateral stents and stone removal . Monitor BMP 
  
UTI (urinary tract infection), complicated, due to indwelling arias catheter / Severe sepsis / Bacteremia - Sepsis POA, urosepsis due to stone and underlying chronic arias. Ur Cx with E coli and Proteus, blood cx with 2 species Proteus. Continue IV ceftriaxone @ 2gm / day. Needs total 14 days tx, change to Levaquin at discharge to complete course 
  
Anemia / Thrombocytopenia: PLT count normalized. Watch Hg which is stable. Suspect at least a component of ACD 
  
Total time spent: 35 minutes, d/w daughter at bedside, d/w CM Time spent in the care of this patient including reviewing records, discussing with nursing and/or other providers on the treatment team, obtaining history and examining the patient, and discussing treatment plans. Care Plan discussed with: Patient, Nursing Staff and >50% of time spent in counseling and coordination of care Discussed:  Care Plan and D/C Planning Prophylaxis:  Hep SQ Disposition:  Home w/Family and HH PT, OT, RN Subjective: Chief Complaint:  Follow up dysphagia Chart/notes/labs/studies reviewed, patient examined at bedside. No fevers. History limited due to speech and mental status. Denies abd pain. In good spirits today Objective:  
 
 
Vitals:  
 
  
Last 24hrs VS reviewed since prior progress note. Most recent are: 
 
Visit Vitals /56 (BP 1 Location: Left arm, BP Patient Position: At rest) Pulse 80 Temp 98 °F (36.7 °C) Resp 18 Ht 6' (1.829 m) Wt 82.1 kg (181 lb) SpO2 96% BMI 24.55 kg/m² SpO2 Readings from Last 6 Encounters:  
05/22/19 96% 05/10/19 97% 04/30/19 98% 04/26/19 98% 04/19/19 98% 04/09/19 98% O2 Flow Rate (L/min): 2 l/min Intake/Output Summary (Last 24 hours) at 5/22/2019 1708 Last data filed at 5/22/2019 1515 Gross per 24 hour Intake 860 ml Output 2400 ml Net -1540 ml Exam:  
 
Physical Exam: 
 
Gen: Elderly, frail, chronically ill-appearing. NAD HEENT:  Pink conjunctivae, hearing intact to voice, moist mucous membranes Neck:  Supple, without masses Resp:  No accessory muscle use, clear breath sounds without wheezes rales or rhonchi 
Card:  No murmurs, normal S1, S2 without thrills, bruits or peripheral edema Abd:  Soft, non-tender, non-distended, reduced bowel sounds are present, no mass. PEG in place. Musc:  No cyanosis or clubbing Skin:  No rashes or ulcers, skin turgor is good Neuro:  Cranial nerves are grossly intact, complete motor weakness, follows commands poorly. Poor speech Psych:  Poor insight, oriented to person, place Medications Reviewed: (see below) Lab Data Reviewed: (see below) 
 
______________________________________________________________________ Medications:  
 
Current Facility-Administered Medications Medication Dose Route Frequency  pantoprazole (PROTONIX) 40 mg in sodium chloride 0.9% 10 mL injection  40 mg IntraVENous DAILY  baclofen (LIORESAL) tablet 2.5 mg  2.5 mg Oral TID  oxybutynin (DITROPAN) tablet 5 mg  5 mg Oral TID  zinc oxide-cod liver oil (DESITIN) 40 % paste   Topical DAILY  white petrolatum-mineral oil (EUCERIN) cream   Topical DAILY  bisacodyl (DULCOLAX) suppository 10 mg  10 mg Rectal DAILY PRN  
 cefTRIAXone (ROCEPHIN) 2 g in 0.9% sodium chloride (MBP/ADV) 50 mL  2 g IntraVENous Q24H  
 sodium chloride (NS) flush 5-10 mL  5-10 mL IntraVENous PRN  
 sodium chloride (NS) flush 5-40 mL  5-40 mL IntraVENous Q8H  
 sodium chloride (NS) flush 5-40 mL  5-40 mL IntraVENous PRN  
 acetaminophen (TYLENOL) tablet 650 mg  650 mg Oral Q4H PRN  
 naloxone (NARCAN) injection 0.4 mg  0.4 mg IntraVENous PRN  
 ondansetron (ZOFRAN) injection 4 mg  4 mg IntraVENous Q4H PRN  
 menthol-zinc oxide (CALMOSEPTINE) 0.44-20.6 % ointment 1 Each  1 Each Topical DAILY PRN  
 traMADol (ULTRAM) tablet 75 mg  75 mg Oral Q6H PRN  
 heparin (porcine) injection 5,000 Units  5,000 Units SubCUTAneous Q8H Lab Review:  
 
Recent Labs  
  05/22/19 
0448 05/20/19 
0013 WBC 7.9 9.3 HGB 8.9* 8.6* HCT 28.1* 27.5*  
 290 Recent Labs  
  05/22/19 
0448 05/20/19 
3381 * 137  
K 4.3 4.3  107 CO2 23 25 * 119* BUN 10 9 CREA 0.66* 0.62* CA 7.9* 7.5* MG 2.3 2.2 PHOS 3.4 3.4 ALB 2.2* 2.0*  
SGOT  --  14* ALT  --  25 No components found for: [place] No results for input(s): PH, PCO2, PO2, HCO3, FIO2 in the last 72 hours. No results for input(s): INR in the last 72 hours. No lab exists for component: INREXT, INREXT Lab Results Component Value Date/Time Specimen Description: URINE 05/25/2009 05:30 PM  
 
Lab Results Component Value Date/Time Culture result: NO GROWTH 6 DAYS 05/12/2019 09:29 AM  
 Culture result: PROTEUS MIRABILIS (PREDOMINATING) (A) 05/11/2019 11:37 AM  
 Culture result: ESCHERICHIA COLI (A) 05/11/2019 11:37 AM  
 Culture result: (A) 05/11/2019 11:37 AM  
  PROTEUS MIRABILIS ISOLATED FROM 3 OF 4 BOTTLES DRAWN. ..CL WHITE AND CL BLUE  Culture result: (A) 05/11/2019 11:37 AM  
 PRELIMINARY REPORT OF GRAM NEGATIVE RODS GROWING IN 3 OF 4 BOTTLES DRAWN CALLED TO AND READ BACK BY Abby Fregoso AT 1036 05.12.2019 [facility] Culture result: REMAINING BOTTLE(S) HAS/HAVE NO GROWTH IN 5 DAYS 05/11/2019 11:37 AM  
 
        
___________________________________________________ Attending Physician: Janith Klinefelter, MD

## 2019-05-22 NOTE — PROGRESS NOTES
Bedside and Verbal shift change report given to [employer], RN (oncoming nurse) by Viola Wade RN (offgoing nurse). Report included the following information SBAR, Kardex, MAR, Accordion and Recent Results.

## 2019-05-22 NOTE — PROGRESS NOTES
[address] [facility] NP 
[phone] GI PROGRESS NOTE 
 
 
NAME: Jagdish Yañez :  1950 MRN:  [id number] Subjective: \"Im going home today. \" 
 
 
Objective:  
Resting in bed in NAD. VITALS:  
Last 24hrs VS reviewed since prior progress note. Most recent are: 
Visit Vitals /56 (BP 1 Location: Left arm, BP Patient Position: At rest) Pulse 80 Temp 98 °F (36.7 °C) Resp 18 Ht 6' (1.829 m) Wt 82.1 kg (181 lb) SpO2 96% BMI 24.55 kg/m² Intake/Output Summary (Last 24 hours) at 2019 1604 Last data filed at 2019 1157 Gross per 24 hour Intake 780 ml Output 2400 ml Net -1620 ml PHYSICAL EXAM: 
General: Alert, in no acute distress HEENT: Anicteric sclerae. Lungs:            CTA Bilaterally. Heart:  Regular  rhythm, Abdomen: Soft, Non distended, Non tender.  (+)Bowel sounds, no HSM Extremities: No c/c/e Neurologic:  CN 2-12 gi, Alert and oriented X 3. No acute neurological distress. PEG site kiesha with on erythema or drainage. Psych:   Good insight. Not anxious nor agitated. Lab Data Reviewed:  
Recent Labs  
  19 
0448 19 
0013 WBC 7.9 9.3 HGB 8.9* 8.6* HCT 28.1* 27.5*  
 290 Recent Labs  
  19 
0448 19 
2397 * 137  
K 4.3 4.3  107 CO2 23 25 BUN 10 9 CREA 0.66* 0.62* * 119* PHOS 3.4 3.4 CA 7.9* 7.5* Recent Labs  
  19 
0448 19 
1361 SGOT  --  14* AP  --  85  
TP  --  6.9 ALB 2.2* 2.0*  
GLOB  --  4.9*  
 
 
________________________________________________________________________ Patient Active Problem List  
Diagnosis Code  Quadriplegia ([address] G82.50  Flexion contractures M24.50  Sacral wound S31.000A  Nephrolithiasis N20.0  Hepatitis C virus infection cured after antiviral drug therapy Z86.19  Severe sepsis (HCC) A41.9, R65.20  Hydronephrosis N13.30  
 S/P  shunt Z98.2  UTI (urinary tract infection) N39.0  DNR (do not resuscitate) [id number]  Hematuria R31.9  Anemia D64.9  Dysphagia R13.10  Hypokalemia E87.6  Goals of care, counseling/discussion Z71.89  
 ACP (advance care planning) Z71.89 Assessment and Plan: Dysphagia:  Status post PEG pcmt. Pts daughter has been taught to administer bolus feeding. I discussed monitoring the insertion site for drainage, erythema, and tenderness. Ok to discharge from GI standpoint. Please call with any questions or concerns.  
  
Signed By: Barbi Hirsch NP   
 5/22/2019  4:04 PM

## 2019-05-22 NOTE — PALLIATIVE CARE DISCHARGE
Goals of Care/Treatment Preferences The Palliative Medicine team was consulted as part of your/your loved one's care in the hospital. Our team is a supportive service; we strive to relieve suffering and improve quality of life. We reviewed advance care planning information, which includes the following: 
Patient's Healthcare Decision Maker is[de-identified] Named in scanned ACP document Confirm Advance Directive: Yes, on file Does the patient have other document types: Power of [place], Do Not Resuscitate Patient/Health Care Proxy Stated Goals: (Full restorative measures in an effort to recover) We reviewed / discussed your code status as: DNR 
   Full Code means perform CPR in the event of cardiac arrest. 
    DNR means do NOT perform CPR in the event of cardiac arrest. 
    Partial Code means you have specific preferences, please discuss with your healthcare team. 
    Karthikeyan Venegas means this issue was not addressed / resolved during your stay Medical Interventions: Limited additional interventions Other Instructions: You have a Durable Do Not Resuscitate Order in place, which should travel with you. When you are in a facility, this form should be placed on your chart. Once you are home, it is recommended that the [facility] form be placed in a visible location such as on the refrigerator or bedroom door. Artificially Administered Nutrition: Feeding tube long-term, if indicated Because of the importance of this information, we are providing you with a printed copy to share with other healthcare providers after this hospitalization is complete.

## 2019-05-22 NOTE — ROUTINE PROCESS
Interdisciplinary team rounds were held 5/22/2019 with the following team members:Care Management, Nursing, Nutrition, Pharmacy, Physical Therapy and Physician. Plan of care discussed. See clinical pathway and/or care plan for interventions and desired outcomes. Plan: dc today after tube feeds arranged for home.

## 2019-05-22 NOTE — PROGRESS NOTES
Discharge order noted. Medication education provided to family on Levaquin, discharge prescription. Patient's daughter given discharge instructions and prescription. Patient's daughter, caregiver, verbalizes understanding of discharge instructions, prescription, tube feeds and plan of care. Patient's daughter signed discharge instructions and they're placed on chart. E-sign not working. Never smoker

## 2019-05-23 NOTE — PROGRESS NOTES
Bedside shift change report given to Betina (oncoming nurse) by [address] (offgoing nurse). Report included the following information SBAR, Kardex, MAR and Recent Results.

## 2019-05-23 NOTE — PROGRESS NOTES
Bedside shift change report given to Rancho Gray (oncoming nurse) by Delphine Figueroa (offgoing nurse). Report included the following information SBAR, Kardex, MAR and Recent Results.

## 2019-05-23 NOTE — TELEPHONE ENCOUNTER
Spoke to Genna Phoenix, CM and informed that patient bed is owned by patient, it is broken. Bed has been fixed 4 times. Family would like a new bed for patient. Fara Hussein will call [employer] and order bed. Hospitalist to be made aware of situation. Fara Hussein will call [facility] nurse with updates.

## 2019-05-23 NOTE — PROGRESS NOTES
Today's Updates/Plan 1. CM followed up with [employer] in regards to status of feeding tube supply order. CM was informed that they have not received confirmation from the insurance. 2. [employer] approved Pt for suctioning machine and will deliver equipment at home once Pt is discharged. CM made CM aware that they also provided feeding tubes and supplies with education. CM sent feeding tube supplies order to [employer] as well. Awaiting response. Update: Pt has been approved for tube feed and supplies through [employer]. 3. CM received a phone call from Pt's daughter. Cm was informed that Pt's hospital bed has broken. CM was requested to look into getting hospital bed ordered before discharge. Pt's current hospital bed comes from a DME company that is no longer in business. Update: CM contacted [employer] and [employer], CM was informed that beds were out of stock. Discharge plan 1. Home with family via [facility] transport.  
  
2. Follow-up with [facility] primary home care 
  
3. JOSE FRANCISCO FREEDMAN [facility] KANDI Barba, [employer] [facility]

## 2019-05-23 NOTE — PROGRESS NOTES
Nutrition: 
 
Updated TF order if pt remains hospitalized until bed can be fixed/replaced. See order. Total feeds should be 5 in one day, assumed pt would be at home tonight for 5th feeding. Feed as tolerated but space feedings ~3 hrs apart to allow gastric emptying. Can feed tonight 5th feeding ~9pm if pt remains in hospital. Ideally feeding would be earlier to allow more time before going to sleep for the night. Monitor weight and Na while admitted. BG elevated this morning. Consider taking A1C. Linette Prado, [address] [facility]

## 2019-05-23 NOTE — PROGRESS NOTES
[facility] [address] 
[address] [facility], [address] [place] [address] 
[phone] Medical Progress Note NAME: Farhat Bundy :  1950 MRM:  [id number] Date/Time: 2019 Assessment / Plan: Dysphagia / Quadriplegia / S/P  shunt / Flexion contractures / Sacral wound - POA, severe, chronic. Dysphagia is worse than baseline. Failed MBS repeatedly so PEG placed and now tolerating tube feeds. Continue baclofen, oxybutynin, PPI, laxatives. Stable for DC, awaiting hospital bed (bed-bound, quadriplegia, aspiration risk). Lengthy discussion with daughter and CM Nephrolithiasis / Hydronephrosis / Hematuria - POA and underlying source of sepsis. Appreciate urology evaluation, and s/p cystoscopy with bilateral stents and stone removal . Monitor BMP 
  
UTI (urinary tract infection), complicated, due to indwelling arias catheter / Severe sepsis / Bacteremia - Sepsis POA, urosepsis due to stone and underlying chronic arias. Ur Cx with E coli and Proteus, blood cx with 2 species Proteus. Continue IV ceftriaxone @ 2gm / day. Transition to PO Levaquin at discharge to complete course 
  
Anemia / Thrombocytopenia: PLT count normalized. Follow Hg, stable 
 
  
Total time spent: 25 minutes, d/w daughter at bedside, d/w CM, IDT rounds Time spent in the care of this patient including reviewing records, discussing with nursing and/or other providers on the treatment team, obtaining history and examining the patient, and discussing treatment plans. Care Plan discussed with: Patient, Nursing Staff and >50% of time spent in counseling and coordination of care Discussed:  Care Plan and D/C Planning Prophylaxis:  Hep SQ Disposition:  Home w/Family and HH PT, OT, RN Subjective: Chief Complaint:  Follow up dysphagia Chart/notes/labs/studies reviewed, patient examined at bedside. History limited due to speech and mental status. Denies abd pain. No fevers Objective:  
 
 
Vitals:  
 
  
Last 24hrs VS reviewed since prior progress note. Most recent are: 
 
Visit Vitals /69 (BP 1 Location: Left arm, BP Patient Position: At rest) Pulse 87 Temp 97.9 °F (36.6 °C) Resp 18 Ht 6' (1.829 m) Wt 82.1 kg (181 lb) SpO2 95% BMI 24.55 kg/m² SpO2 Readings from Last 6 Encounters:  
05/23/19 95% 05/10/19 97% 04/30/19 98% 04/26/19 98% 04/19/19 98% 04/09/19 98% O2 Flow Rate (L/min): 2 l/min Intake/Output Summary (Last 24 hours) at 5/23/2019 1637 Last data filed at 5/23/2019 1150 Gross per 24 hour Intake 1050 ml Output 2300 ml Net -1250 ml Exam:  
 
Physical Exam: 
 
Gen: Elderly, frail, chronically ill-appearing. NAD HEENT:  Pink conjunctivae, hearing intact to voice, moist mucous membranes Neck:  Supple, without masses Resp:  No accessory muscle use, clear breath sounds without wheezes rales or rhonchi 
Card:  No murmurs, normal S1, S2 without thrills, bruits or peripheral edema Abd:  Soft, non-tender, non-distended, reduced bowel sounds are present, no mass. PEG in place. Musc:  No cyanosis or clubbing Skin:  No rashes or ulcers, skin turgor is good Neuro:  Cranial nerves are grossly intact, quadriplegia, follows commands poorly. Poor / very limited speech. Contractures Psych:  Poor insight, oriented to person, place Medications Reviewed: (see below) Lab Data Reviewed: (see below) 
 
______________________________________________________________________ Medications:  
 
Current Facility-Administered Medications Medication Dose Route Frequency  pantoprazole (PROTONIX) 40 mg in sodium chloride 0.9% 10 mL injection  40 mg IntraVENous DAILY  baclofen (LIORESAL) tablet 2.5 mg  2.5 mg Oral TID  oxybutynin (DITROPAN) tablet 5 mg  5 mg Oral TID  zinc oxide-cod liver oil (DESITIN) 40 % paste   Topical DAILY  white petrolatum-mineral oil (EUCERIN) cream   Topical DAILY  bisacodyl (DULCOLAX) suppository 10 mg  10 mg Rectal DAILY PRN  
 cefTRIAXone (ROCEPHIN) 2 g in 0.9% sodium chloride (MBP/ADV) 50 mL  2 g IntraVENous Q24H  
 sodium chloride (NS) flush 5-10 mL  5-10 mL IntraVENous PRN  
 sodium chloride (NS) flush 5-40 mL  5-40 mL IntraVENous Q8H  
 sodium chloride (NS) flush 5-40 mL  5-40 mL IntraVENous PRN  
 acetaminophen (TYLENOL) tablet 650 mg  650 mg Oral Q4H PRN  
 naloxone (NARCAN) injection 0.4 mg  0.4 mg IntraVENous PRN  
 ondansetron (ZOFRAN) injection 4 mg  4 mg IntraVENous Q4H PRN  
 menthol-zinc oxide (CALMOSEPTINE) 0.44-20.6 % ointment 1 Each  1 Each Topical DAILY PRN  
 traMADol (ULTRAM) tablet 75 mg  75 mg Oral Q6H PRN  
 heparin (porcine) injection 5,000 Units  5,000 Units SubCUTAneous Q8H Lab Review:  
 
Recent Labs  
  05/22/19 
0448 WBC 7.9 HGB 8.9* HCT 28.1*  
 Recent Labs  
  05/23/19 
0856 05/22/19 
0448 * 133* K 4.0 4.3  105 CO2 25 23 * 139* BUN 11 10 CREA 0.60* 0.66* CA 7.8* 7.9*  
MG  --  2.3 PHOS  --  3.4 ALB  --  2.2* No components found for: [place] No results for input(s): PH, PCO2, PO2, HCO3, FIO2 in the last 72 hours. No results for input(s): INR in the last 72 hours. No lab exists for component: INREXT, INREXT Lab Results Component Value Date/Time Specimen Description: URINE 05/25/2009 05:30 PM  
 
Lab Results Component Value Date/Time Culture result: NO GROWTH 6 DAYS 05/12/2019 09:29 AM  
 Culture result: PROTEUS MIRABILIS (PREDOMINATING) (A) 05/11/2019 11:37 AM  
 Culture result: ESCHERICHIA COLI (A) 05/11/2019 11:37 AM  
 Culture result: (A) 05/11/2019 11:37 AM  
  PROTEUS MIRABILIS ISOLATED FROM 3 OF 4 BOTTLES DRAWN. ..CL WHITE AND CL BLUE  Culture result: (A) 05/11/2019 11:37 AM  
  PRELIMINARY REPORT OF GRAM NEGATIVE RODS GROWING IN 3 OF 4 BOTTLES DRAWN CALLED TO AND READ BACK BY GIA KELLEY RN AT 3186 05.12.2019 [facility] Culture result: REMAINING BOTTLE(S) HAS/HAVE NO GROWTH IN 5 DAYS 05/11/2019 11:37 AM  
 
        
___________________________________________________ Attending Physician: Leana Rodriguez MD

## 2019-05-23 NOTE — PROGRESS NOTES
Attempted to make follow up appointment with Dr Saintclair [place] but office said they will have to put a message into the nurse for an appointment. Nurse will call patient with appointment

## 2019-05-23 NOTE — TELEPHONE ENCOUNTER
Caller left voicemail stating the pt is supposed to be discharged today with a peg tube and the pt's hospital bed is broken.  Caller is asking for a call back asap

## 2019-05-23 NOTE — PROGRESS NOTES
Bedside and Verbal shift change report given to [address] [place] [address] (oncoming nurse) by Lia Mosquera (offgoing nurse).  Report included the following information SBAR, Kardex, Intake/Output, MAR and Recent Results.

## 2019-05-24 NOTE — DISCHARGE SUMMARY
Physician Discharge Summary Patient ID: 
Edinson Palafox [id number] 
4 y.o. 
1950 Admit date: 5/11/2019 Discharge date: 5/24/2019 Admission Diagnoses: Severe sepsis ([address] [A41.9, R65.20] Principal Discharge Diagnoses:   
Sepsis, bacteremia OTHER PROBLEMS ADDRESSEDS Principal Diagnosis Severe sepsis ([address] Principal Problem: 
  Severe sepsis ([address] (5/11/2019) Active Problems: 
  Quadriplegia ([address] (2/4/2019) Flexion contractures (2/4/2019) Sacral wound (2/4/2019) Nephrolithiasis (2/4/2019) Hepatitis C virus infection cured after antiviral drug therapy (3/10/2019) Hydronephrosis (5/11/2019) S/P  shunt (5/11/2019) UTI (urinary tract infection) (5/11/2019) DNR (do not resuscitate) (5/11/2019) Hematuria (5/11/2019) Anemia (5/16/2019) Dysphagia (5/16/2019) Hypokalemia (5/16/2019) Goals of care, counseling/discussion () 
 
  ACP (advance care planning) () Patient Active Problem List  
Diagnosis Code  Quadriplegia ([facility] G82.50  Flexion contractures M24.50  Sacral wound S31.000A  Nephrolithiasis N20.0  Hepatitis C virus infection cured after antiviral drug therapy Z86.19  Severe sepsis (HCC) A41.9, R65.20  Hydronephrosis N13.30  
 S/P  shunt Z98.2  
 UTI (urinary tract infection) N39.0  DNR (do not resuscitate) [id number]  Hematuria R31.9  Anemia D64.9  Dysphagia R13.10  Hypokalemia E87.6  Goals of care, counseling/discussion Z71.89  
 ACP (advance care planning) Z71.89 Hospital Course: Mr. Irina Naidu is a 80 yo WM w/ hx of quadriplegia presenting with fever, lethargy, admitted for sepsis, found to have UTI/bacteremia Dysphagia / Quadriplegia / S/P  shunt / Flexion contractures / Sacral wound - POA, severe, chronic. Dysphagia is worse than baseline.  Failed MBS repeatedly so PEG placed and now tolerating tube feeds.  Continue baclofen, oxybutynin, PPI, laxatives. Stable for DC, awaiting hospital bed (bed-bound, quadriplegia, aspiration risk). Lengthy discussion with daughter and CM 
  
Nephrolithiasis / Hydronephrosis / Hematuria - POA and underlying source of sepsis.  Appreciate urology evaluation, and s/p cystoscopy with bilateral stents and stone removal 5/17. Monitor BMP. Follow up outpatient 
  
UTI (urinary tract infection), complicated, due to indwelling arias catheter / Severe sepsis / Bacteremia - Sepsis POA, urosepsis due to stone and underlying chronic arias. Ur Cx with E coli and Proteus, blood cx with 2 species Proteus. Treated with IV ceftriaxone, transitioned to PO Levaquin at discharge to complete course 
  
Anemia / Thrombocytopenia: PLT count normalized. Follow Hg, stable Pt discharged in improved and stable condition. Procedures performed: see above Imaging studies: see above CT a/p 5/11 1. Bilateral hydronephrosis and hydroureter and thickened urinary bladder wall 
with small stones in the bladder. 2. No renal or ureteral calculus. Probable small right renal cyst. 
3. Ventriculoperitoneal shunt tube. 4. Interstitial disease and bilateral lower lobe atelectasis. 5. Cholecystolithiasis. 6. Atherosclerotic abdominal aorta. 7. Ankylosing spondylitis. 5/20 IMPRESSION: Aspiration with all consistencies. PCP: Michael Parsons NP Consults: Pulmonary/Intensive care, GI, Urology and Palliative Care Discharge Exam: 
Patient Vitals for the past 12 hrs: 
 Temp Pulse Resp BP SpO2  
05/24/19 1519 97.9 °F (36.6 °C) 86 18 121/77 98 % 05/24/19 1136 98.2 °F (36.8 °C) 98 18 128/68 96 % 05/24/19 0727 98 °F (36.7 °C) 88 18 116/64 90 % GEN: NAD, chronically ill-appearing CV: RRR 
RESP: CTAB Disposition: home Patient Instructions:  
Current Discharge Medication List  
  
START taking these medications Details  
levoFLOXacin (LEVAQUIN) 750 mg tablet Take 1 Tab by mouth daily for 2 days. Qty: 2 Tab, Refills: 0  
  
omeprazole (PRILOSEC) 2 mg/mL susp 2 mg/mL oral suspension (compounded) Take 20 mL by mouth Daily (before breakfast) for 30 days. Qty: 600 mL, Refills: 0 CONTINUE these medications which have NOT CHANGED Details  
ergocalciferol (ERGOCALCIFEROL) 50,000 unit capsule Take 50,000 Units by mouth Every Thursday. traMADol (ULTRAM) 50 mg tablet Take 75 mg by mouth every Tuesday and Friday. bisacodyl (DULCOLAX) 10 mg suppository Insert 10 mg into rectum two (2) times a week. Tuesday and Friday  
  
baclofen (LIORESAL) 10 mg tablet TAKE 1 TABLET BY MOUTH THREE TIMES DAILY Qty: 270 Tab, Refills: 2 Associated Diagnoses: Quadriplegia ([address] oxybutynin (DITROPAN) 5 mg tablet TAKE 1 TABLET BY MOUTH THREE TIMES DAILY Qty: 270 Tab, Refills: 2 Associated Diagnoses: Quadriplegia ([address] miconazole (ZEASORB AF) 2 % topical powder Apply  to affected area two (2) times daily as needed. Menthol-Zinc Oxide (CALMOSEPTINE) 0.44-20.6 % oint Apply 1 Each to affected area daily as needed. apply to back and buttocks daily and as needed for soiling Activity: See discharge instructions Diet: See discharge instructions Wound Care: See discharge instructions Follow-up Information Follow up With Specialties Details Why Contact Info Eddie Dunne NP Nurse Practitioner In 1 week  [address] [facility] [address] [facility] [id number] 
[phone] Roz Evans MD Urology In 1 week  [facility] 100 350 [facility] 
[phone] López Burgess MD Gastroenterology  As needed 13[address] [facility] [address] [place] [address] 
[phone] [facility]    [address] [address] 
[phone] I spent 35 minutes on this discharge.  
 
Signed: 
Nara Mcfarland MD 
5/24/2019 
8:52 AM 
 
CC: PCP Eddie Dunne NP

## 2019-05-24 NOTE — PROGRESS NOTES
Bedside and Verbal shift change report given to Angelic Lara (oncoming nurse) by Linette Mcclelland (offgoing nurse). Report included the following information SBAR, Kardex, Intake/Output, MAR and Recent Results.

## 2019-05-24 NOTE — PROGRESS NOTES
Rec'd report from reji RN at bedside/ Pt resting in bed. 2200 Bolus tube feeding completed--only 10 ml residual prior to bolus feeding/site no s/s of infection/no redness or swelling noted. 75ml water flush before /after feeding. Mouth care performed. 2300 Report given to Rufus Pires at bedside.

## 2019-05-24 NOTE — PROGRESS NOTES
Update:  CM spoke with Pt's daughter, CM informed that hospital bed has not arrived. CM attempted to call [phone] [address] for ETA for delivery, CM was informed that company would follow-up via phone call with ETA. CM provided company with unit secretary's number. [facility] arranged for 8pm transport. CM notified daughter to call the unit secretary to notify if bed has arrived. Daughter is also aware of 8pm [facility] . Daughter stated that she would call hospital by 7:30pm if the bed does not arrive. Nursing to call [facility]: [phone] if time needs to be adjusted or transport needs to be canceled. Cordell [address], BS, [facility]

## 2019-05-24 NOTE — PROGRESS NOTES
Follow up with Mr. Lizabeth Boone on 5 Med Surg. His daughter was present. They are preparing to be discharged. Multiple staff came in during visit to assist with information about his discharge. Mr. Lizabeth Boone continued to share with me as staff spoke with his daughter. He affirmed his loan, love, and strength from God. He is very thankful for all that God has done for him. Provided spiritual presence and prayer for the continued journey. Visited by: Kristin Posadas., MS., [address] (1545)

## 2019-05-24 NOTE — PROGRESS NOTES
5/24/2019 
3:32 PM 
CM informed by [address] [employer] that hospital bed delivery is in route and will arrive before 5pm. CM to arrange [facility] ambulance transport.

## 2019-05-24 NOTE — ROUTINE PROCESS
Bedside and Verbal shift change report given to [facility] (oncoming nurse) by Pedro Medina (offgoing nurse). Report included the following information SBAR, Kardex, Intake/Output, MAR and Recent Results.

## 2019-05-24 NOTE — PROGRESS NOTES
Bedside and Verbal shift change report given to NALINI Olivo (oncoming nurse) by Ben Pollock (offgoing nurse). Report included the following information SBAR, Kardex, Intake/Output, MAR and Recent Results.

## 2019-05-24 NOTE — PROGRESS NOTES
1745: patient ready for discharge to home report given to Karen lewis daughter waiting for bed to arrive at home transport picking him up at 8pm

## 2019-05-24 NOTE — PROGRESS NOTES
Today's Updates/Plan 1. CM received a voicemail from Maciej Moura that Pt has been approved for hospital bed and would deliver bed after 12pm. CM followed up with Pt's daughter with update. 2. [facility] has been arranged and they are aware that Pt is a sepsis Bundle. 3. [facility] continues to be on will call until confirmation of hospital bed arrival.  
  
Discharge plan 1. Home with family via [facility] transport.  
  
2. Follow-up with [facility] primary home care 
  
3. [facility] Cordell [address], BS, [facility]

## 2019-05-24 NOTE — DISCHARGE INSTRUCTIONS
HOSPITALIST DISCHARGE INSTRUCTIONS  NAME: Hosea Jernigan   :  1950   MRN:  [id number]     Date/Time:  2019 8:27 AM    ADMIT DATE: 2019     DISCHARGE DATE: 2019     PRINCIPAL DISCHARGE DIAGNOSES:  Septic shock from bacteremia (bloodstream infection) and UTI (urinary tract infection)    MEDICATIONS:  · It is important that you take the medication exactly as they are prescribed. Note the changes and additions to your medications. Be sure you understand these changes before you are discharged today. · Keep your medication in the bottles provided by the pharmacist and keep a list of the medication names, dosages, and times to be taken in your wallet. · Do not take other medications without consulting your doctor. Pain Management: per above medications    What to do at Home    Recommended diet:   Tube feeding: Jevity 1.5 - Bolus 240 mL 5 times per day with 75 mL water flush before and after bolus.     Additional bolus of 120 mL tube feed required to meet nutrition needs. Flush with 60 mL water after this bolus. Feed as tolerated but space feedings ~3 hrs apart to allow gastric emptying    You can provide 6 meals one day and 5 meals the next if that is easier. Monitor patients weight to see if amount of feedings need to be adjusted. Recommended activity: PT/OT Eval and Treat    If you experience any of the following symptoms then please call your primary care physician or return to the emergency room if you cannot get hold of your doctor:  Fever, chills, severe abdominal pain, nausea, vomiting, diarrhea, change in mentation, falling, bleeding, severe chest pain, shortness of breath, or other severe concerning symptoms that brought you to the hospital in the first place    Follow Up:   Follow-up Information     Follow up With Specialties Details Why Contact Info    Richa Palm NP Nurse Practitioner In 1 week  [facility] [phone] Efrain Bonilla MD Urology In 1 week  [address]  [phone]      Mk Lay MD Gastroenterology  As needed [address] [facility]  [phone]      [facility]    [address]/[facility] [phone]            Information obtained by :  I understand that if any problems occur once I am at home I am to contact my physician. I understand and acknowledge receipt of the instructions indicated above.                                                                                                                                            Physician's or R.N.'s Signature                                                                  Date/Time                                                                                                                                              Patient or Representative Signature                                                          Date/Time

## 2019-05-25 NOTE — PROGRESS NOTES
Bedside and Verbal shift change report given to NALINI Mitchell (oncoming nurse) by Manan Babin (offgoing nurse). Report included the following information SBAR and Kardex.

## 2019-05-25 NOTE — PROGRESS NOTES
CM Note Called pt's wife who stated that the bed was delivered and set up late last evening. Pt is set up with [facility] and will travel home via [facility].  
NALINI Kwon

## 2019-05-25 NOTE — DISCHARGE SUMMARY
Kin Reena [facility] 79 
[address] Tel: [phone] Physician Discharge Summary Patient ID:    Ke Dillard Age:              79 y.o.    : 1950 MRN:             [id number] PCP: Bambi Curtis NP Admit date: 2019 Discharge date: 2019 Principal admission Diagnosis: 
 Severe sepsis ([address] [A41.9, R65.20] Discharge Diagnoses: 
Principal Problem: 
  Severe sepsis ([address] (2019) Quadriplegia ([address] (2019) Flexion contractures (2019) Sacral wound (2019) Nephrolithiasis (2019) Hepatitis C virus infection cured after antiviral drug therapy (3/10/2019) Hydronephrosis (2019) S/P  shunt (2019) UTI (urinary tract infection) (2019) DNR (do not resuscitate) (2019) Hematuria (2019) Anemia (2019) Dysphagia (2019) Hypokalemia (2019) Hospital Course: Mr. Tushar Malik is a 79 y.o. admitted to [facility] and discharged yesterday 19 but was unable to leave due to a hospital bed not having been delivered in time. He has remained stable overnight with no new changes. Kindly refer to Dr Sreedhar Claros discharge summary for hospital details. He was discharged today in stable condition. Discharge Exam:   
Visit Vitals /72 (BP 1 Location: Right arm, BP Patient Position: At rest) Pulse 100 Temp 98 °F (36.7 °C) Resp 18 Ht 6' (1.829 m) Wt 83.8 kg (184 lb 12.8 oz) SpO2 96% BMI 25.06 kg/m² Disposition: home Time taken to arrange discharge:  35 minutes. Signed: 
MD Srinivasan [facility] 2019   10:57 AM

## 2019-05-25 NOTE — PROGRESS NOTES
Ambulance arrived and still daughter did not call if bed delivered. I called the daughter stating bed just arrived and still installing. I asked daughter if ambulance will take pt or reschedule at 11pm daughter stated too late. Rescheduled in am for discharge MD notified.

## 2019-05-30 NOTE — TELEPHONE ENCOUNTER
Pharmacist called to say if patient needs liquid medication he will need a PA. If we can change most recent medication to pill there is no need for PA. Please call [phone].

## 2019-05-31 NOTE — TELEPHONE ENCOUNTER
Home Based Primary Care Telephone Call Note    Narrative:  KOFFI called and spoke with pt's spouse, Birgit Otto, who had question about hospital bed donation. SW informed that [employer] does not accept donated hospital beds, but SW will work to find an agency that may accept the bed that pt no longer needs. KOFFI scheduled visit to the home for monthly SW assessment on Tuesday June 4. Plan: KOFFI will visit with pt and family in the home on Tuesday June 4 for monthly SW assessment.        DALI Álvarez, LCSW    Home Based Primary Care  [phone]

## 2019-06-01 NOTE — PROGRESS NOTES
Home Based Primary Care [facility] FOR [facility]) & Supportive Services    [phone]      NOTE: Home Based Primary Care is a PROVIDER (MD/NP) based interdisciplinary practice (RN, LCSW, Pharmacy, Dietician) for patient's who cannot access (or have a taxing effort) primary / speciality medical care in an office setting. [facility] [place] is NOT [employer] but works with [address]. when there is a skilled need. Our MD/NPs are integral in Care Transitions; PLEASE CALL [phone] if this patient arrives in the Emergency Department or Hospital.          Date of Current Visit: 05/30/19    Patient/Family present for Current Visit: Patient, wife, sister, daughter, and patient's cat    Goals of Care as stated by the patient/family is: to be as healthy as possible and comfortable    Preferences for hospitalization if that were to be necessary:  [] Patient DOES NOT WANT hospitalization; focus all treatments at home  [x] Patient WOULD WANT hospitalization for potentially reversible causes  Patient prefers hospitalization at: [facility]      Is this encounter billed on time :  No  Total time: 90 minutes  Counseling / coordination time: 60 minutes on sepsis and prevention, indwelling foleys, peg care, wound care, knee pain and options  > 50% counseling / coordination?: Yes    Transition of Care:   Post hospital Day 5  [facility]:  5/11/19 to 5/25/19  Hospital summary:  Mr. Kenzie Jaimes presented with fever and lethargy to the ER was was found to have severe shock due to a UTI. Patient has an indwelling arias and was found to have stones with bilateral hydronephrosis and on 5/17 had cystoscopy with bilateral stents and stone removal.  He is to f/u outpatient with urology. His urine Cx grew out E coli and Proteus and blood cultures had two specie of proteus  He was treated with IV Ceftriaxone and transitioned to po Levaquin at discharge. He continued with PEG tube feedings. He as discharged home in good condition.     ASSESSMENT & PLAN Diagnoses and all orders for this visit:    1. OAB (overactive bladder)   -family requests liquid oxybutynin and this was ordered in place of pills  2. Chronic pain of left knee   -Multiple treatments have been tried without success   -Patient has quadraparesis secondary to trauma and contractures   -Patient had a remove knee surgery in high school   -Family would like to try a steroid injection   -Let them know they would need to go to an orthorpedist   -Discussed how the constant twist to the leg because of contracture is straining the knee  and causing pain   -Boxtox might be an option   -Family will make the appointment  3. Dysphagia, unspecified type   -Patient has tube feeding   -Family is trying to simplify regimen from five feedings a day and are now giving 2 cans in  the am and then one three more feedings   -cautioned against cutting back times too much as hypoglycemia can occur in patients  with tube feedings  4. Quadriplegia [facility])   -patient is complete care and his family has been providing care for 30 years and deny  burnout  5. Arias catheter in place   -Patient does not have a h/o of UTI's until recently despite arias being in place for 30 years   -Answered family questions  6. Septic shock ([facility] [address]   -resolved   -reviewed outcomes and sequelae with sepsis and signs and sypmptoms  7. PEG (percutaneous endoscopic gastrostomy) status ([facility] [place] [address]   -reviewed care of PEG tube site  8. Pressure injury of sacral region, stage 2   -Mepiplex ordered by home health and they will treat wound and follow it       Follow-up and Dispositions    · Return in about 2 weeks (around 6/13/2019).           I have left a SUMMARY / Mingo Rod from today's visit with the patient/family in the home    Edward Gomez [address] Maintenance Due   Topic Date Due    FOBT Q 1 YEAR AGE 50-75  10/[birth year]2002        CC & SUBJECTIVE including ROS & FUNCTION     Chief Complaint   Patient presents with   Kip Forte Andrés Andrade is a 79 y.o. with chronic medical conditions as listed in the patient's updated Problem List (see below)    Their Subjective Information provided as today's visit includes: Patient reports he feels like he did before he was sick. Tube feedings are going well and family doubled up am feeding to minimize stress on the patient's wife so he receives feedings 4 time a day instead of 5. He denies bloating, N/V with the am feeding that is doubled. He continues to c/o left knee pain, brooke with movement. Family wants to try a steroid injection as everything else has failed. He is having BM's several times a day that are soft. Patient had skin tears to his buttocks in the hospital bilaterally and now they are bigger and bleeding.  ordered mepiplex which cleared up the wounds before. He has been afebrile since coming home. Positive ROS findings: left knee pain    The following systems were [x] reviewed / [] unable to be reviewed  Systems: constitutional, ears/nose/mouth/throat, respiratory, gastrointestinal, genitourinary, musculoskeletal, integumentary, neurologic, psychiatric, endocrine. PPS:20  Karnofsky:   Timed Up and Go (TUG):   Fall Risk Assessment, last 12 mths 3/8/2019   Able to walk? No       Current Durable Medical Equipment in Home:  [x] Hospital Bed  [] Bedside Commode  [x] Wheelchair  [x] Chong Mcelroy  [] Pushpa Gayle  [] Respiratory Support:    ADVANCE CARE PLANNING                                 The following information was updated I/ reviewed as accurate in Orders and the Advance Care Planning Navigator:  @[facility]@     Primary Decision Maker: Praveen Sue - Daughter - [phone]  Advance Care Planning 5/11/2019   Patient's Healthcare Decision Maker is: Named in scanned ACP document   Confirm Advance Directive Yes, on file   Does the patient have other document types Power of [address]; Do Not Resuscitate        VITAL SIGNS & PHYSICAL EXAM     Median Arm Circumference (inches):     Wt Readings from Last 3 Encounters:   05/25/19 184 lb 12.8 oz (83.8 kg)   02/07/17 152 lb (68.9 kg)     Temp Readings from Last 3 Encounters:   05/30/19 97.6 °F (36.4 °C) (Axillary)   05/28/19 98.2 °F (36.8 °C) (Temporal)   05/25/19 98 °F (36.7 °C) (Temporal)     BP Readings from Last 3 Encounters:   05/30/19 132/74   05/28/19 110/66   05/25/19 130/70     Pulse Readings from Last 3 Encounters:   05/30/19 72   05/28/19 76   05/25/19 76       (Constitutional) Patient Physical Status: Patient is an elderly male who appears older than stated age lying in bed with contractures of extremities and very soft voice    Pacemaker:  ICD:  Feeding Tube:PEG  Urine Catheter: Hale--urine duncan and clear  Other:    Eyes: pupils equal, anicteric, conjunctiva pink  ENMT: no nasal discharge, moist mucous membranes, pharynx clear, soft voice  Neck:  Trachea midline, No JVD, THM, or LAD  Cardiovascular: regular rhythm, no M/R/G, distal pulses intact, no edema  Respiratory: breathing not labored, symmetric, CTA bilat, decreased in the bases  Gastrointestinal: + bowel sounds, soft, non-tender, non-distended; PEG intact  Musculoskeletal: contractures all 4 extremities, tenderness to palpation left knee below patella  Skin: warm, dry. Each buttock with stage 2 bleeding, each 3 cm's around.   For 8 cm's around, area of nonblanchable erythema  Neurologic: A/Ox3, following commands that he can physically follow  Psychiatric: normal affect, no hallucinations  Other:     PROBLEM LIST / PAST MEDICAL / SOCIAL HX     Patient Active Problem List   Diagnosis Code    Quadriplegia ([facility] [address] G82.50    Flexion contractures M24.50    Sacral wound S31.000A    Nephrolithiasis N20.0    Hepatitis C virus infection cured after antiviral drug therapy Z86.19    Severe sepsis (HCC) A41.9, R65.20    Hydronephrosis N13.30    S/P  shunt Z98.2    UTI (urinary tract infection) N39.0    DNR (do not resuscitate) Z66    Hematuria R31.9    Anemia D64.9    Dysphagia R13.10    Hypokalemia E87.6    Goals of care, counseling/discussion Z71.89    ACP (advance care planning) Z71.89      History reviewed. No pertinent family history. Social History     Socioeconomic History    Marital status:      Spouse name: Not on file    Number of children: Not on file    Years of education: Not on file    Highest education level: Not on file   Tobacco Use    Smoking status: Former Smoker    Smokeless tobacco: Never Used   Substance and Sexual Activity    Alcohol use: No    Drug use: No    Sexual activity: Not Currently   Other Topics Concern        MEDICATIONS & PRESCRIPTIONS     No Known Allergies   Current Outpatient Medications   Medication Sig    ergocalciferol (ERGOCALCIFEROL) 50,000 unit capsule Take 1 Cap by mouth Every Thursday.  traMADol (ULTRAM) 50 mg tablet Take 75 mg by mouth every Tuesday and Friday.  bisacodyl (DULCOLAX) 10 mg suppository Insert 10 mg into rectum two (2) times a week. Tuesday and Friday    baclofen (LIORESAL) 10 mg tablet TAKE 1 TABLET BY MOUTH THREE TIMES DAILY    oxybutynin (DITROPAN) 5 mg/5 mL syrup Take 5 mL by mouth three (3) times daily for 90 days.  omeprazole (PRILOSEC) 2 mg/mL susp 2 mg/mL oral suspension (compounded) Take 20 mL by mouth Daily (before breakfast) for 30 days.  miconazole (ZEASORB AF) 2 % topical powder Apply  to affected area two (2) times daily as needed.  Menthol-Zinc Oxide (CALMOSEPTINE) 0.44-20.6 % oint Apply 1 Each to affected area daily as needed. apply to back and buttocks daily and as needed for soiling     No current facility-administered medications for this visit. Medications Ordered Today   Medications    DISCONTD: oxybutynin (DITROPAN) 5 mg/5 mL syrup     Sig: Take 5 mL by mouth three (3) times daily for 90 days.      Dispense:  450 mL     Refill:  3         TEST DATA REVIEWED:     Lab Results   Component Value Date/Time    Hemoglobin A1c 5.1 05/24/2019 04:02 AM     No results found for: Torito Alvarez, MCA2, MCA3, MCAU, MCAU2, MCALPOCT  No results found for: TSH, TSH2, TSH3, TSHP, TSHEXT  Lab Results   Component Value Date/Time    VITAMIN D, 25-HYDROXY 25.3 (L) 10/23/2018 03:13 PM         Lab Results   Component Value Date/Time    WBC 7.9 05/22/2019 04:48 AM    HGB 8.9 (L) 05/22/2019 04:48 AM    PLATELET 293 18/32/7783 04:48 AM     Lab Results   Component Value Date/Time    Sodium 134 (L) 05/24/2019 04:02 AM    Potassium 4.5 05/24/2019 04:02 AM    Chloride 104 05/24/2019 04:02 AM    CO2 26 05/24/2019 04:02 AM    BUN 13 05/24/2019 04:02 AM    Creatinine 0.69 (L) 05/24/2019 04:02 AM    Calcium 8.0 (L) 05/24/2019 04:02 AM    Magnesium 2.3 05/22/2019 04:48 AM    Phosphorus 3.4 05/22/2019 04:48 AM      Lab Results   Component Value Date/Time    AST (SGOT) 14 (L) 05/20/2019 06:14 AM    Alk.  phosphatase 85 05/20/2019 06:14 AM    Protein, total 6.9 05/20/2019 06:14 AM    Albumin 2.2 (L) 05/22/2019 04:48 AM    Globulin 4.9 (H) 05/20/2019 06:14 AM     Lab Results   Component Value Date/Time    Iron 22 (L) 05/16/2019 02:30 PM    TIBC 175 (L) 05/16/2019 02:30 PM    Iron % saturation 13 (L) 05/16/2019 02:30 PM    Ferritin 213 05/16/2019 02:30 PM

## 2019-06-03 NOTE — TELEPHONE ENCOUNTER
Dada Fish wanted to clarify which staff is [facility] and which is Zackery Fitzgerald. Dada Fish would like [place] to only come 1-2 times weekly. Also informed Dada Fish that prilosec is in the appeals process.

## 2019-06-03 NOTE — TELEPHONE ENCOUNTER
Patient's daughter called. Would like to change some of the visit times from us and would like to discuss the amount of people that attend each visit. Please call her.

## 2019-06-04 NOTE — PROGRESS NOTES
Home Based Primary Care Social Work Note    Narrative: SW visited with pt in his home where he resides with wife, Latanya Em, and Marisela's daughter, Isa Loza. Pt was in bed during today's visit. He was pleasant and open to brief visit from SW but said he was feeling fatigued. When he began to sleep, SW and spouse went into the living room to have a conversation. She expressed interest in donating pt's old hospital bed since the motor no longer works and he has a new hospital bed. SW had called [address] , however they do not accept hospital beds. SW had called the local [place] chapter of the [employer], and they accept hospital beds but beds have to be in full working condition and cannot be more than 11years old (pt's bed is 9years old and motor does not work). SW had called both [facility], and they are also not accepting hospital beds at this time. SW encouraged Ltaanya Em to post the bed to [facility] sites such as the [employer], [employer], [employer], or her local [employer] group. She said she had been thinking about doing this, as she just wants to get it out of her house since it is taking up space. Spouse informed that she feels she is getting too many visits from [address] [place] [address] Kamran Mcmlulen. She requested that this SW inform the [address] [place] [address] team to change the order for Home Health to have less frequent visits. She said the number of visits has become unnecessary and stressful for her. She also requested that this SW look in to the status of mepliex bandages that were to have been ordered last week, she said. She informed that pt has a sore opening on his buttocks, and she would like to have \"heart shaped mepilex\" bandages as soon as possible. KOFFI sent message to [address] [place] [address] team for follow up. Plan: SW will follow up with 1x/month for psychosocial support, and will remain available for additional support and resources as needed.      Coral Mckay, MSW, LCSW    Home Based [facility]  [phone]

## 2019-06-05 NOTE — TELEPHONE ENCOUNTER
Spoke to Daquan in [facility] and asked her to let Sharri Goodrich know that patient needs heart shaped mepilex. Daquan said she will message her, she is seeing patient today.

## 2019-06-07 NOTE — TELEPHONE ENCOUNTER
Ubaldo Fonseca wanted to change baclofen to liquid form. After discussing with pharmacy, patient would use 1 bottle every 4 days, and baclofen liquid is on back order until the end of July. Informed Concepcion Seo, patient's daughter, and she has agreed to leave medication in pill form.

## 2019-06-11 PROBLEM — A41.9 SEVERE SEPSIS (HCC): Status: RESOLVED | Noted: 2019-01-01 | Resolved: 2019-01-01

## 2019-06-11 PROBLEM — L89.152 DECUBITUS ULCER OF SACRAL REGION, STAGE 2 (HCC): Status: ACTIVE | Noted: 2019-01-01

## 2019-06-11 PROBLEM — Z93.1 PEG (PERCUTANEOUS ENDOSCOPIC GASTROSTOMY) STATUS (HCC): Status: ACTIVE | Noted: 2019-01-01

## 2019-06-11 PROBLEM — K21.9 GASTROESOPHAGEAL REFLUX DISEASE WITHOUT ESOPHAGITIS: Status: ACTIVE | Noted: 2019-01-01

## 2019-06-11 PROBLEM — R65.20 SEVERE SEPSIS (HCC): Status: RESOLVED | Noted: 2019-01-01 | Resolved: 2019-01-01

## 2019-06-11 PROBLEM — N32.81 OAB (OVERACTIVE BLADDER): Status: ACTIVE | Noted: 2019-01-01

## 2019-06-11 NOTE — PATIENT INSTRUCTIONS
Learning About Turning a Person in Bed Why is it important to turn a person in bed? People sometimes have to stay in bed for long periods of time. They may be very sick, in pain, or very weak and not be able to move themselves into different positions. It's very important that your loved one changes positions. Lying in one position for a long time can cause pressure injuries (also called pressure sores). Pressure injuries are damage to the skin. They can range from red areas on the surface of the skin to severe tissue damage that goes deep into muscle and bone. These problems are hard to treat and slow to heal. When pressure injuries don't heal well, they can cause problems such as bone, blood, and skin infections. Pressure injuries usually occur over bony areas, such as the hips, lower back, elbows, heels, and shoulders. They can also occur in places where the skin folds over on itself. You can help your loved one avoid pressure injuries by helping him or her turn and change position in bed. A drawsheet can help. What is a drawsheet? A drawsheet makes it easier to \"roll\" your loved one into another position. You can buy a drawsheet, or you can make one with a sheet. You then make the bed using the drawsheet. To make a drawsheet: 
· Fold a sheet in half lengthwise. · Place the sheet on top of the fitted bottom sheet so that the top and bottom of the drawsheet go across the bed (perpendicular to the bed). Position the drawsheet so that it will be between your loved one's head and knees. · Tuck in the drawsheet tightly on both sides. Smooth out any wrinkles to reduce possible skin irritation. How do you turn a person in bed? Before getting started, tell your loved one that you want him or her to roll into another position. If your loved one has any drains, tubes, or other medical equipment, adjust them so they don't get in the way. If your loved one can help · You may need to help your loved one scoot toward the opposite side of the bed so that he or she will have room to roll. · Go to the side of the bed you want your love one to roll toward. · Ask your loved one to lie on his or her back with the knees bent. Have your loved one place his or her arms across the body. · Ask your loved one to roll toward you while keeping the knees bent. If you have a rail on the bed, have your loved one reach toward the rail. · Help your loved one as needed. Gently place your hands on the shoulders and hips, and guide him or her toward you. If your loved one can't help It is best to turn your loved one every 2 hours. If your loved one cannot move or finds it very hard, you can use your drawsheet (see \"What is a drawsheet? \"). Have a family member or friend help you. It is easier for two people to turn someone, and it can be dangerous for one person to do it. Position your loved one · One person stands on each side of the bed. If your loved one is in a hospital bed, lower the height of the bed. This will make it easier to turn the person. · Untuck the drawsheet on both sides of the bed. Each person gathers up one side so you both almost have a \"handle\" to grab. You may also need to make sure your loved one is high enough up in the bed. If not, lift him or her toward the head of the bed. · Agree on a count, and then lift and move your loved one to the side of the bed you're going to roll away from. · Tuck in the drawsheet on the side of the bed that your loved one will roll toward. Move your loved one When you and your assistant are ready: 
· Help your loved one lie on his or her back with the knees bent. If your loved one can't bend the knees, cross one ankle over the other in the direction of the turn. Position your loved one's arms across his or her body. · Stand on opposite sides of the bed.  One person will pull and the other push. Be sure that you and your assistant have your feet shoulder-width apart. This will help you avoid straining your back. ? If you're pulling your loved one toward you, lean from your hips (don't bend your back), reach over your loved one, and grab the drawsheet at your loved one's hip and shoulder areas. Slowly pull the drawsheet toward you to roll your loved one over. ? If you're rolling your loved one away from you, slowly push at the hip and shoulder areas. Moving someone in bed is best as a two-person job. If your loved one can help, even a little, you may be able to do it yourself. But do all you can to find someone to help you. Positioning a drawsheet Positioning a drawsheet 1. When you make someone's bed with a drawsheet, position it so that it is between the person's head and knees. Turning or moving a person in bed 1. The drawsheet can then be used to turn or move the person you're caring for. What do you do after turning someone? You can use pillows to help your loved one get comfortable and avoid pressure injuries. If your loved one is on his or her side: · Place pillows in front of your loved one, at chest level, with the top arm draped over a pillow. · If needed, tuck one edge of a pillow under the buttock, lengthwise. Then fold the pillow under and tuck the other edge under the first edge. That creates a \"roll\" that stays in place better and helps keep your loved one from rolling back. · Place a pillow between your loved one's knees, with the legs slightly bent. · Put the top leg a little in front of the bottom leg. This takes pressure off the bottom leg. · Put a pillow under the bottom leg so that the bottom ankle is off the bed. If your loved one is on his or her back: 
· Put a pillow under your loved one's legs between the knees and ankles. · Do not put anything under the heels. · If you have a hospital bed, don't adjust the top end above 30 degrees. This helps prevent your loved one from sliding down. When you are finished, smooth out the drawsheet in its original position and tuck it in. Where can you learn more? Go to http://rob-gill.info/. Enter A279 in the search box to learn more about \"Learning About Turning a Person in Bed. \" Current as of: April 18, 2018 Content Version: 11.9 © 8701-2864 [employer], [employer]. Care instructions adapted under license by [employer] (which disclaims liability or warranty for this information). If you have questions about a medical condition or this instruction, always ask your healthcare professional. [address] any warranty or liability for your use of this information.

## 2019-06-11 NOTE — PROGRESS NOTES
Home Based Primary Care [facility] FOR [facility]) & Supportive Services   
[phone] NOTE: Home Based Primary Care is a PROVIDER (MD/NP) based interdisciplinary practice (RN, LCSW, Pharmacy, Dietician) for patient's who cannot access (or have a taxing effort) primary / speciality medical care in an office setting. [facility] is NOT [employer] but works with [employer]. when there is a skilled need. Our MD/NPs are integral in Care Transitions; PLEASE CALL [phone] if this patient arrives in the Emergency Department or Hospital.  
  
 
 
Date of Current Visit: 06/11/19 Patient/Family present for Current Visit: wife and daughter Goals of Care as stated by the patient/family is: to be as healthy as possible and comfortable Preferences for hospitalization if that were to be necessary: 
[] Patient DOES NOT WANT hospitalization; focus all treatments at home 
[x] Patient WOULD WANT hospitalization for potentially reversible causes Patient prefers hospitalization at: [facility] Is this encounter billed on time:NO Total time:  
Counseling / coordination time:  
> 50% counseling / coordination?:  
 
ASSESSMENT & PLAN Diagnoses and all orders for this visit: 
 
1. Quadriplegia ([address] 2. PEG (percutaneous endoscopic gastrostomy) status ([address] 3. Decubitus ulcer of sacral region, stage 2 
 
4. OAB (overactive bladder) 5. Gastroesophageal reflux disease without esophagitis 6. Healthcare maintenance Other orders -     Lactose-Free Food with Fiber (JEVITY 1.5 MARLI) 0.06 gram-1.5 kcal/mL liqd; 2 AM 1 afternoon and 2 in the evening (total  - 8oz container) 1. New hospital bed since hospitalization. This has improved caregiving ability. Home health following. Wearing Prevlon boots. Private aide supporting family with caregiving needs. Continue schedule bowel regimen days - Tues/Fri. 
 
2. Reviewed PEG site care and feeding regimen.  Tolerating Jevity - 5 cartons daily. Awaiting insurance approval for liquid Prilosec. Wife able to demonstrate and verbal PEG care,feedings and when to notify for complications 3. Will have HHN re-eval for large Mepilex dressing as current dressing causing additional breakdown. Recommending skin prep to be applied prior to new dressing placement. Reviewed offloading pressure/frequent repositioning. 4. 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 Doing well with current Oxybutynin dosing. 5. Awaiting insurance approval for liquid Prilosec. Head of bed to elevate 30 degrees 6. Discussed with wife on completing FIT testing. Reportedly completed and mailed in March 2019. Will have office follow up for results. Follow-up and Dispositions · Return in about 1 month (around 7/9/2019), or if symptoms worsen or fail to improve, for Regular Follow-up. I have left a SUMMARY / INSTRUCTIONS from today's visit with the patient/family in the home HEALTH MAINTENANCE Health Maintenance Due Topic Date Due  
 FOBT Q 1 YEAR AGE 50-75  10/22/2002 CC & SUBJECTIVE including ROS & FUNCTION Chief Complaint Patient presents with  Wound Check Jose Cardenas is a 79 y.o. with chronic medical conditions as listed in the patient's updated Problem List (see below) Their Subjective Information provided as today's visit includes:  
 
Obtain mainly from wife. Supplemented by patient and daughter Wife reports patient doing well since last encounter. [facility] following patient for wound care. Wife and caregiver providing wound care in between. Wife concerned the dressing is too small and asked to eval today. Tolerating TF without difficulty. Wife reports she is feeling more comfortable with PEG and feedings now. Back on Tue/ Fri bowel regimen days which occur after breakfast. Wife/caregiver assists with turning patient to the left side in bed.  She checks vault for impactions then administers suppository. This continues to work well for patient. Sees urology on -  Dr Tj Bray 6/26 @ 4pm. 
 
 
Positive ROS findings: left knee pain, buttock wound The following systems were [x] reviewed / [] unable to be reviewed Systems: constitutional, ears/nose/mouth/throat, respiratory, gastrointestinal, genitourinary, musculoskeletal, integumentary, neurologic, psychiatric, endocrine. PPS:20% Karn[place]sky:  
Timed Up and Go (TUG): N/A Fall Risk Assessment, last 12 mths 6/11/2019 Able to walk? No  
 
 
Current Durable Medical Equipment in Home: 
[x] Hospital Bed 
[] Bedside Commode 
[x] Wheelchair 
[x] Vannessa Jersey 
[] Morrie Locket 
[] Respiratory Support: 
 
ADVANCE CARE PLANNING The following information was updated I/ reviewed as accurate in Orders and the Advance Care Planning Navigator: 
FULL CODE Primary Decision Maker: Ty Burnette - [phone] Advance Care Planning 5/11/2019 Patient's Healthcare Decision Maker is: Named in scanned ACP document Confirm Advance Directive Yes, on file Does the patient have other document types Power of [employer]; Do Not Resuscitate VITAL SIGNS & PHYSICAL EXAM  
 
Median Arm Circumference (inches): Wt Readings from Last 3 Encounters:  
05/25/19 184 lb 12.8 oz (83.8 kg) 02/07/17 152 lb (68.9 kg) Temp Readings from Last 3 Encounters:  
06/11/19 98.2 °F (36.8 °C) (Temporal) 06/05/19 98.2 °F (36.8 °C) (Temporal) 06/01/19 97.2 °F (36.2 °C) (Temporal) BP Readings from Last 3 Encounters:  
06/11/19 109/68  
06/01/19 106/60  
05/30/19 132/74 Pulse Readings from Last 3 Encounters:  
06/11/19 85  
06/05/19 86  
06/01/19 86  
 
 
(Constitutional) Patient Physical Status: visibly debilitated  male, laying in hospital bed, NAD Pacemaker: N/A 
ICD:N/A Feeding Tube:N/A 
Urine Catheter: In-dwelling arias catheter Other: PEG TUBE Eyes: pupils equal, anicteric, conjunctiva pink ENMT: no nasal discharge, moist mucous membranes, pharynx clear, soft voice Neck:  Trachea midline, No JVD, THM, or LAD Cardiovascular: regular rhythm, no M/R/G, distal pulses intact, no edema Respiratory: breathing not labored, symmetric, CTA bilat, decreased in the bases Gastrointestinal: + bowel sounds, soft, non-tender, non-distended; PEG intact Musculoskeletal: contractures all 4 extremities, tenderness to palpation left knee below patella Skin: warm, dry. Each buttock with stage 2each 3 cm's around. approx. 8 cm's around, area of nonblanchable erythema Neurologic: A/Ox3, following commands that he can physically follow Psychiatric: normal affect, no hallucinations Other: PROBLEM LIST / PAST MEDICAL / SOCIAL HX Patient Active Problem List  
Diagnosis Code  Quadriplegia [address] [address] [facility] G82.50  Flexion contractures M24.50  Sacral wound S31.000A  Nephrolithiasis N20.0  Hepatitis C virus infection cured after antiviral drug therapy Z86.19  
 Hydronephrosis N13.30  
 S/P  shunt Z98.2  
 UTI (urinary tract infection) N39.0  DNR (do not resuscitate) [id number]  Hematuria R31.9  Anemia D64.9  Dysphagia R13.10  Hypokalemia E87.6  Goals of care, counseling/discussion Z71.89  
 ACP (advance care planning) Z71.89  
 PEG (percutaneous endoscopic gastrostomy) status ([facility] [facility] Z93.1  Decubitus ulcer of sacral region, stage 2 L89.152  Gastroesophageal reflux disease without esophagitis K21.9  
 OAB (overactive bladder) N32.81 No family history on file. Social History Socioeconomic History  Marital status:  Spouse name: Not on file  Number of children: Not on file  Years of education: Not on file  Highest education level: Not on file Tobacco Use  Smoking status: Former Smoker  Smokeless tobacco: Never Used Substance and Sexual Activity  Alcohol use: No  
 Drug use:  No  
  Sexual activity: Not Currently Other Topics Concern MEDICATIONS & PRESCRIPTIONS No Known Allergies Current Outpatient Medications Medication Sig  Lactose-Free Food with Fiber (JEVITY 1.5 MARLI) 0.06 gram-1.5 kcal/mL liqd 2 AM 1 afternoon and 2 in the evening (total  - 8oz container)  oxybutynin (DITROPAN) 5 mg/5 mL syrup Take 5 mL by mouth three (3) times daily for 90 days.  ergocalciferol (ERGOCALCIFEROL) 50,000 unit capsule Take 1 Cap by mouth Every Thursday.  traMADol (ULTRAM) 50 mg tablet Take 75 mg by mouth every Tuesday and Friday.  baclofen (LIORESAL) 10 mg tablet TAKE 1 TABLET BY MOUTH THREE TIMES DAILY  miconazole (ZEASORB AF) 2 % topical powder Apply 1 g to affected area two (2) times daily as needed for Itching.  Menthol-Zinc Oxide (CALMOSEPTINE) 0.44-20.6 % oint Apply 1 Each to affected area daily as needed. apply to back and buttocks daily and as needed for soiling  omeprazole (PRILOSEC) 2 mg/mL susp 2 mg/mL oral suspension (compounded) Take 20 mL by mouth Daily (before breakfast) for 30 days.  bisacodyl (DULCOLAX) 10 mg suppository Insert 10 mg into rectum two (2) times a week. Tuesday and Friday No current facility-administered medications for this visit. Medications Ordered Today Medications  Lactose-Free Food with Fiber (JEVITY 1.5 MARLI) 0.06 gram-1.5 kcal/mL liqd Si AM 1 afternoon and 2 in the evening (total  - 8oz container) Dispense:  100 Bottle Refill:  0  
  
 
 TEST DATA REVIEWED:  
 
Lab Results Component Value Date/Time Hemoglobin A1c 5.1 2019 04:02 AM  
 
No results found for: Ruth Jt, MCA2, MCA3, MCAU, MCAU2, MCALPOCT No results found for: TSH, TSH2, TSH3, TSHP, TSHEXT, TSHEXT Lab Results Component Value Date/Time VITAMIN D, 25-HYDROXY 25.3 (L) 10/23/2018 03:13 PM  
   
 
Lab Results Component Value Date/Time  WBC 7.9 2019 04:48 AM  
 HGB 8.9 (L) 2019 04:48 AM  
 PLATELET 484 88/53/7509 04:48 AM  
 
Lab Results Component Value Date/Time Sodium 134 (L) 05/24/2019 04:02 AM  
 Potassium 4.5 05/24/2019 04:02 AM  
 Chloride 104 05/24/2019 04:02 AM  
 CO2 26 05/24/2019 04:02 AM  
 BUN 13 05/24/2019 04:02 AM  
 Creatinine 0.69 (L) 05/24/2019 04:02 AM  
 Calcium 8.0 (L) 05/24/2019 04:02 AM  
 Magnesium 2.3 05/22/2019 04:48 AM  
 Phosphorus 3.4 05/22/2019 04:48 AM  
  
Lab Results Component Value Date/Time AST (SGOT) 14 (L) 05/20/2019 06:14 AM  
 Alk. phosphatase 85 05/20/2019 06:14 AM  
 Protein, total 6.9 05/20/2019 06:14 AM  
 Albumin 2.2 (L) 05/22/2019 04:48 AM  
 Globulin 4.9 (H) 05/20/2019 06:14 AM  
 
Lab Results Component Value Date/Time  Iron 22 (L) 05/16/2019 02:30 PM  
 TIBC 175 (L) 05/16/2019 02:30 PM  
 Iron % saturation 13 (L) 05/16/2019 02:30 PM  
 Ferritin 213 05/16/2019 02:30 PM

## 2019-06-12 NOTE — TELEPHONE ENCOUNTER
KOFFI rec'd message from Corinne Rumble, NP, informing that pt has urology appointment scheduled for 6/26/19. KOFFI called pt's spouse to offer assistance in arranging for transportation to this appointment. Pt has [employer], transportation will be arranged through his plan. No answer. KOFFI left a voicemail, encouraged Melvi Number to call back at her convenience if she would like SW to assist with setting up transportation through [facility] for this appointment.
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
## 2019-07-16 PROBLEM — T17.908A ASPIRATION INTO RESPIRATORY TRACT: Status: ACTIVE | Noted: 2019-01-01

## 2019-07-16 NOTE — PROGRESS NOTES
Home Based Primary Care [facility] FOR [facility]) & Supportive Services   
[phone] NOTE: Home Based Primary Care is a PROVIDER (MD/NP) based interdisciplinary practice (RN, LCSW, Pharmacy, Dietician) for patient's who cannot access (or have a taxing effort) primary / speciality medical care in an office setting. [facility] is NOT [employer] but works with [employer] when there is a skilled need. Our MD/NPs are integral in Care Transitions; PLEASE CALL [phone] if this patient arrives in the Emergency Department or Hospital.  
  
 
 
Date of Current Visit: 07/16/19 Patient/Family present for Current Visit: wife and daughter Goals of Care as stated by the patient/family is: to be as healthy as possible and comfortable Preferences for hospitalization if that were to be necessary: 
[] Patient DOES NOT WANT hospitalization; focus all treatments at home 
[x] Patient WOULD WANT hospitalization for potentially reversible causes Patient prefers hospitalization at: [facility] Is this encounter billed on time:YES Total time: 40min Counseling / coordination time: review, discussion and completion of controlled substance agreement w/ patient and family present, collection of urine, coordination of care w/ SLP for repeat MBS study, PEG tube care, coordination of home health, health maintenance education provided for flu vaccination, reinforced importance of collection of stool sample for colon cancer screening microphone off 
> 50% counseling / coordination?: yes ASSESSMENT & PLAN Diagnoses and all orders for this visit: 1. PEG (percutaneous endoscopic gastrostomy) status ([facility] -     XR SWALLOW FUNC VIDEO; Future Discussed progression with speech therapy 
-Repeat modified barium swallow study ordered, instruction in scheduling provided to patient and wife 
-Continue tube feeds as ordered at this time 2. Quadriplegia ([facility] -Reinforced good skin care to include repositioning every 2 hours and offloading pressure to extremities 
-Home health assisting with wound care and repositioning education 3. Aspiration into respiratory tract, subsequent encounter & Oropharyngeal dysphagia -     XR SWALLOW FUNC VIDEO; Future 
-As noted above #1 patient have repeat modified barium swallow study 4. Chronic pain syndrome -     TOXASSURE SELECT 13 (MW) 
-     XR SWALLOW FUNC VIDEO; Future 
-Controlled substance agreement discussed, reviewed and completed with patient and wife present 
-Urine drug screen collected 5. Healthcare maintenance 
-Patient wishes to pursue annual flu vaccine once available -Wife still working to collect fit test/colon cancer screening I have left a SUMMARY / INSTRUCTIONS from today's visit with the patient/family in the home HEALTH MAINTENANCE Health Maintenance Due Topic Date Due  
 FOBT Q 1 YEAR AGE 50-75  10/22/2002 CC & SUBJECTIVE including ROS & FUNCTION Chief Complaint Patient presents with  Dysphagia  Pain (Chronic) Dario Decker is a 79 y.o. with chronic medical conditions as listed in the patient's updated Problem List (see below) Their Subjective Information provided as today's visit includes:  
 
Obtained mainly from wife and supplemented by patient and daughter Patient is reportedly doing well. Speech therapy working with patient and recommending repeat modified barium swallow study. Family shares that patient is encouraged and motivated to transition from tube feeds to oral nutrition. Pain well controlled with tramadol. Continues regular bowel movement schedule every Tuesday and Thursday. Skin continues to heal with frequent repositioning and use of barrier creams and antifungal treatment. Fully managed and changed by [facility] health Positive ROS findings: left knee pain, buttock wound The following systems were [x] reviewed / [] unable to be reviewed Systems: constitutional, ears/nose/mouth/throat, respiratory, gastrointestinal, genitourinary, musculoskeletal, integumentary, neurologic, psychiatric, endocrine. PPS:20% Karn[place]sky:  
Timed Up and Go (TUG): N/A Fall Risk Assessment, last 12 mths 7/16/2019 Able to walk? No  
 
 
Current Durable Medical Equipment in Home: 
[x] Hospital Bed 
[] Bedside Commode 
[x] Wheelchair 
[x] Cloyde Passy 
[] Sandi Reas 
[] Respiratory Support: 
 
ADVANCE CARE PLANNING The following information was updated I/ reviewed as accurate in Orders and the Advance Care Planning Navigator: 
FULL CODE Primary Decision Maker: Senthil Bhagat - Vasile - [phone] Advance Care Planning 5/11/2019 Patient's Healthcare Decision Maker is: Named in scanned ACP document Confirm Advance Directive Yes, on file Does the patient have other document types Power of [employer]; Do Not Resuscitate VITAL SIGNS & PHYSICAL EXAM  
 
Median Arm Circumference (inches): Wt Readings from Last 3 Encounters:  
05/25/19 184 lb 12.8 oz (83.8 kg) 02/07/17 152 lb (68.9 kg) Temp Readings from Last 3 Encounters:  
07/16/19 98.1 °F (36.7 °C) (Temporal) 07/15/19 98.4 °F (36.9 °C) (Temporal) 07/12/19 98.2 °F (36.8 °C) (Temporal) BP Readings from Last 3 Encounters:  
07/16/19 120/74  
07/15/19 127/70  
07/12/19 110/68 Pulse Readings from Last 3 Encounters:  
07/16/19 64  
07/15/19 78  
07/12/19 76  
 
 
(Constitutional) Patient Physical Status: visibly debilitated  male, laying in hospital bed, NAD Pacemaker: N/A 
ICD:N/A Feeding Tube:N/A 
Urine Catheter: In-dwelling arias catheter Other: PEG TUBE Eyes: pupils equal, anicteric, conjunctiva pink ENMT: no nasal discharge, moist mucous membranes, pharynx clear, soft voice Neck:  Trachea midline, No JVD, THM, or LAD 
 Cardiovascular: regular rhythm, no M/R/G, distal pulses intact, no edema Respiratory: breathing not labored, symmetric, CTA bilat, decreased in the bases Gastrointestinal: + bowel sounds, soft, non-tender, non-distended; PEG intact Musculoskeletal: contractures all 4 extremities, tenderness to palpation left knee below patella Skin: warm, dry. No open wounds rashes or ulcerations Neurologic: A/Ox2-3, soft spoken, following commands that he can physically follow Psychiatric: normal affect, no hallucinations Other: PROBLEM LIST / PAST MEDICAL / SOCIAL HX Patient Active Problem List  
Diagnosis Code  Quadriplegia ([facility] G82.50  Flexion contractures M24.50  Sacral wound S31.000A  Nephrolithiasis N20.0  Hepatitis C virus infection cured after antiviral drug therapy Z86.19  
 Hydronephrosis N13.30  
 S/P  shunt Z98.2  
 UTI (urinary tract infection) N39.0  DNR (do not resuscitate) [phone]  Hematuria R31.9  Anemia D64.9  Dysphagia R13.10  Hypokalemia E87.6  Goals of care, counseling/discussion Z71.89  
 ACP (advance care planning) Z71.89  
 PEG (per[facility]stomy) [facility] Z93.1  Decubitus ulcer of sacral region, stage 2 L89.152  Gastroesophageal reflux disease without esophagitis K21.9  
 OAB (overactive bladder) N32.81  
 Aspiration into respiratory tract T17.908A No family history on file. Social History Socioeconomic History  Marital status:  Spouse name: Not on file  Number of children: Not on file  Years of education: Not on file  Highest education level: Not on file Tobacco Use  Smoking status: Former Smoker  Smokeless tobacco: Never Used Substance and Sexual Activity  Alcohol use: No  
 Drug use: No  
 Sexual activity: Not Currently Other Topics Concern MEDICATIONS & PRESCRIPTIONS No Known Allergies Current Outpatient Medications Medication Sig  
  Lactose-Free Food with Fiber (JEVITY 1.5 MARLI) 0.06 gram-1.5 kcal/mL liqd 2 AM 1 afternoon and 2 in the evening (total  - 8oz container)  oxybutynin (DITROPAN) 5 mg/5 mL syrup Take 5 mL by mouth three (3) times daily for 90 days.  ergocalciferol (ERGOCALCIFEROL) 50,000 unit capsule Take 1 Cap by mouth Every Thursday.  traMADol (ULTRAM) 50 mg tablet Take 75 mg by mouth every Tuesday and Friday.  bisacodyl (DULCOLAX) 10 mg suppository Insert 10 mg into rectum two (2) times a week. Tuesday and Friday  baclofen (LIORESAL) 10 mg tablet TAKE 1 TABLET BY MOUTH THREE TIMES DAILY  miconazole (ZEASORB AF) 2 % topical powder Apply 1 g to affected area two (2) times daily as needed for Itching.  Menthol-Zinc Oxide (CALMOSEPTINE) 0.44-20.6 % oint Apply 1 Each to affected area daily as needed. apply to back and buttocks daily and as needed for soiling No current facility-administered medications for this visit. No orders of the defined types were placed in this encounter. TEST DATA REVIEWED:  
 
Lab Results Component Value Date/Time Hemoglobin A1c 5.1 05/24/2019 04:02 AM  
 
No results found for: Ladene Morita, MCA2, MCA3, MCAU, MCAU2, MCALPOCT No results found for: TSH, TSH2, TSH3, TSHP, TSHEXT, TSHEXT Lab Results Component Value Date/Time VITAMIN D, 25-HYDROXY 25.3 (L) 10/23/2018 03:13 PM  
   
 
Lab Results Component Value Date/Time WBC 7.9 05/22/2019 04:48 AM  
 HGB 8.9 (L) 05/22/2019 04:48 AM  
 PLATELET 438 23/08/9878 04:48 AM  
 
Lab Results Component Value Date/Time Sodium 134 (L) 05/24/2019 04:02 AM  
 Potassium 4.5 05/24/2019 04:02 AM  
 Chloride 104 05/24/2019 04:02 AM  
 CO2 26 05/24/2019 04:02 AM  
 BUN 13 05/24/2019 04:02 AM  
 Creatinine 0.69 (L) 05/24/2019 04:02 AM  
 Calcium 8.0 (L) 05/24/2019 04:02 AM  
 Magnesium 2.3 05/22/2019 04:48 AM  
 Phosphorus 3.4 05/22/2019 04:48 AM  
  
Lab Results Component Value Date/Time AST (SGOT) 14 (L) 05/20/2019 06:14 AM  
 Alk. phosphatase 85 05/20/2019 06:14 AM  
 Protein, total 6.9 05/20/2019 06:14 AM  
 Albumin 2.2 (L) 05/22/2019 04:48 AM  
 Globulin 4.9 (H) 05/20/2019 06:14 AM  
 
Lab Results Component Value Date/Time  Iron 22 (L) 05/16/2019 02:30 PM  
 TIBC 175 (L) 05/16/2019 02:30 PM  
 Iron % saturation 13 (L) 05/16/2019 02:30 PM  
 Ferritin 213 05/16/2019 02:30 PM

## 2019-07-17 NOTE — PATIENT INSTRUCTIONS
Swallowing Study: About This Test  What is it? A swallowing study is a test that shows what your throat and esophagus do while you swallow. The test uses X-rays in real time (fluoroscopy) to film as you swallow. You'll swallow a substance called barium that is mixed with liquid and food. The barium shows the movements of your throat and esophagus on the X-ray while you swallow. Why is this test done? The test helps your doctor see why you're having trouble swallowing. After treatment, it can also show your doctor if the treatment worked. How can you prepare for the test?  · Tell your doctor if:  ? You are taking any medicines. ? You are allergic to any medicines, barium, or any other X-ray contrast material.  ? You are or might be pregnant. ? You are breastfeeding. · Your doctor may tell you not to eat anything after midnight the night before the test.  What happens before the test?  · Remove any jewelry that might get in the way of the X-ray picture. · You may need to take off all or most of your clothes around the area being X-rayed. You may be given a gown to wear during the test.  · A lead shield will be placed over your pelvic area to protect it from radiation. What happens during the test?  · You will stand or sit in front of the X-ray machine and hold very still while the test is done. · The doctor and a speech pathologist will guide you through a series of swallowing steps. You will swallow liquids and then solids, some containing barium. · While you swallow, the doctor and speech pathologist watch the video screen. They may ask you to take different positions to see how they affect your swallowing. The X-rays are recorded so they can be looked at later. What else should you know about the test?  · The barium in the food is not harmful. · You won't feel any pain from the X-ray. How long does the test take? · The test will take about 20 to 30 minutes.   What happens after the test?  · You will probably be able to go home right away. · You can go back to your usual activities right away. · You may have light-colored stools for a few days after the test while the barium leaves your body. · The barium may cause constipation. Drink plenty of water for a couple of days after the test. You may take a laxative if needed. Call your doctor if you haven't had a bowel movement in 2 to 3 days after the test.  Follow-up care is a key part of your treatment and safety. Be sure to make and go to all appointments, and call your doctor if you are having problems. It's also a good idea to keep a list of the medicines you take. Ask your doctor when you can expect to have your test results. Where can you learn more? Go to http://rob-gill.info/. Enter E714 in the search box to learn more about \"Swallowing Study: About This Test.\"  Current as of: June 25, 2018  Content Version: 11.9  © 2694-3676 [employer]. Care instructions adapted under license by [employer] (which disclaims liability or warranty for this information). If you have questions about a medical condition or this instruction, always ask your healthcare professional. [place] [address] any warranty or liability for your use of this information.

## 2019-07-17 NOTE — PROGRESS NOTES
Call from [facility].  Samples dropped off yesterday were picked up today [facility], there's a slight delay in testing.   Just syl

## 2019-07-19 NOTE — TELEPHONE ENCOUNTER
Returned call and spoke with [place] at [facility] and gave additional diagnosis code R13.12.   She requested for new order to be placed

## 2019-07-19 NOTE — TELEPHONE ENCOUNTER
Arely diego/ [employer] called and left voicemail asking for a call back . States she needs a new DX code for the X-ray swallow. The code that was given triggered an ABN.  Please call [phone]

## 2019-08-20 NOTE — TELEPHONE ENCOUNTER
Returned call and LVM for patients wife to call back with phone number to [employer] and I will contact them to find out what;s going on

## 2019-08-20 NOTE — TELEPHONE ENCOUNTER
Patient's wife called. [employer] called to tell her that patient air mattress is going to be picked up. She doesn't know why as patient still needs it for healing. She is very concerned. Please call her.

## 2019-08-22 NOTE — TELEPHONE ENCOUNTER
PAZ for [employer] service rep regarding air mattress. This is the 3rd vm left. Contacted wife to inform her of the multiple attempts to reach the company, asked her to direct the rep to [facility] office if they contact her to pickup the mattress. She agreed to this.

## 2019-08-26 NOTE — TELEPHONE ENCOUNTER
Spoke with [employer] [phone] about air mattress being picked up due to patient having healed wounds. Medicare will not cover the air mattress. [facility] will see patient tomorrow and assess wounds and document the stages. Nurse will fax documentation to Sonal at [phone].

## 2019-08-26 NOTE — TELEPHONE ENCOUNTER
Patient's wife called and ask us to please call [employer] company to let them know patient still needs mattress. Please call [phone] ext [phone] with diagnoses.

## 2019-08-27 PROBLEM — L89.92: Status: ACTIVE | Noted: 2019-01-01

## 2019-08-27 NOTE — PROGRESS NOTES
Home Based Primary Care [facility] FOR [facility]) & Supportive Services    [phone]      NOTE: Home Based Primary Care is a PROVIDER (MD/NP) based interdisciplinary practice (RN, LCSW, Pharmacy, Dietician) for patient's who cannot access (or have a taxing effort) primary / speciality medical care in an office setting. \[facility]\"" is NOT [employer] but works with [employer] when there is a skilled need. Our MD/NPs are integral in Care Transitions; PLEASE CALL [phone] if this patient arrives in the Emergency Department or Hospital.          Date of Current Visit: 08/27/19    Patient/Family present for Current Visit: wife     Goals of Care as stated by the patient/family is: to be as healthy as possible and comfortable    Preferences for hospitalization if that were to be necessary:  [] Patient DOES NOT WANT hospitalization; focus all treatments at home  [x] Patient WOULD WANT hospitalization for potentially reversible causes  Patient prefers hospitalization at: [facility]    Is this encounter billed on time:NO    Total time:   Counseling / coordination time:  > 50% counseling / coordination?:     ASSESSMENT & PLAN       Diagnoses and all orders for this visit:    1. PEG (percutaneous endoscopic gastrostomy) status ([address]  -Discussed progression with speech therapy  -Repeat modified barium swallow study ordered, instruction in scheduling provided to patient and wife, this was previously scheduled however transportation did not show up so the appointment was missed  -Continue tube feeds as ordered at this time    2. Quadriplegia ([address]  -High risk for skin breakdown and recurrent pressure injury due to quadriplegia  -Reinforced good skin care to include repositioning every 2 hours and offloading pressure to extremities  -Home health assisting with wound care and repositioning education    3.  Pressure injury, stage 2, with suspected deep tissue injury  -Stage II pressure ulcer with suspected suspected deep tissue injury surrounding area which is not well-visualized in picture below  -Patient to continue air mattress as he is high risk for ongoing skin breakdown and recurrent pressure injury  -We will have home health assist with wound care    4. Viral upper respiratory tract infection  -Supportive treatment such as Mucinex recommended  -Would normally recommend hydration however patient is receiving supplements or PEG tube  -May have Tylenol as needed for body aches or fever      I have left a SUMMARY / Karey Reeder from today's visit with the patient/family in the home    Edward [address] Maintenance Due   Topic Date Due    FOBT Q 1 YEAR AGE 50-75  10/22/2002        CC & SUBJECTIVE including ROS & FUNCTION     Chief Complaint   Patient presents with    Wound Check    Dysphagia    Nasal Congestion        Ressisharron Mckoy is a 79 y.o. with chronic medical conditions as listed in the patient's updated Problem List (see below)    Their Subjective Information provided as today's visit includes:     Obtained mainly from wife and supplemented by patient-    Patient is reportedly doing well. Seen today for evaluation of ongoing chronic buttock wound. Wife and caregivers do support patient and repositioning during the day as well as he is on a specialized air mattress. Pain well controlled with tramadol. Modified barium swallow study was ordered and in progress however patient's transportation did not show in which it caused him to miss the appointment. Family plans to reschedule in the future and will notify the office when they are ready to proceed. Continues regular bowel movement schedule every Tuesday and Thursday. Wife and patient report both experiencing cold-like symptoms that include nasal congestion and cough. This is currently being managed with over-the-counter \"cold medicine\" however the wife is unable to recall the medication being used at this time.     Positive ROS findings:  buttock wound    The following systems were [x] reviewed / [] unable to be reviewed  Systems: constitutional, ears/nose/mouth/throat, respiratory, gastrointestinal, genitourinary, musculoskeletal, integumentary, neurologic, psychiatric, endocrine. PPS:20%  Karn[place]sky:   Timed Up and Go (TUG): N/A  Fall Risk Assessment, last 12 mths 7/16/2019   Able to walk? No       Current Durable Medical Equipment in Home:  [x] Hospital Bed  [] Bedside Commode  [x] Wheelchair  [x] Rupesh Bring  [] Cathleen Weiss  [] Respiratory Support:    ADVANCE CARE PLANNING                                 The following information was updated I/ reviewed as accurate in Orders and the Advance Care Planning Navigator:  FULL CODE    Primary Decision Maker: Faith Sandoval - Vasile - [phone]  Advance Care Planning 5/11/2019   Patient's Healthcare Decision Maker is: Named in scanned ACP document   Confirm Advance Directive Yes, on file   Does the patient have other document types Power of [employer]; Do Not Resuscitate        VITAL SIGNS & PHYSICAL EXAM     Median Arm Circumference (inches):     Wt Readings from Last 3 Encounters:   05/25/19 184 lb 12.8 oz (83.8 kg)   02/07/17 152 lb (68.9 kg)     Temp Readings from Last 3 Encounters:   08/27/19 98.4 °F (36.9 °C) (Temporal)   08/20/19 98.4 °F (36.9 °C) (Temporal)   08/15/19 97.3 °F (36.3 °C)     BP Readings from Last 3 Encounters:   08/27/19 114/60   08/20/19 108/60   08/15/19 121/66     Pulse Readings from Last 3 Encounters:   08/27/19 75   08/20/19 70   08/15/19 69       (Constitutional) Patient Physical Status: visibly debilitated  male, laying in hospital bed, NAD    Pacemaker: N/A  ICD:N/A  Feeding Tube:N/A  Urine Catheter: In-dwelling arias catheter  Other: PEG TUBE    Eyes: pupils equal, anicteric, conjunctiva pink  ENMT: no nasal discharge, moist mucous membranes, pharynx clear, soft voice  Neck:  Trachea midline, No JVD, THM, or LAD  Cardiovascular: regular rhythm, no M/R/G, distal pulses intact, no edema  Respiratory: breathing not labored, symmetric, CTA bilat, decreased in the bases  Gastrointestinal: + bowel sounds, soft, non-tender, non-distended; PEG intact  Musculoskeletal: contractures all 4 extremities, tenderness to palpation left knee below patella  Skin: warm, dry. Stage II wounds to bilateral buttock with evidence of deep tissue injury surrounding it which is not well visualized on photo below there does not appear to be any drainage or infection at this time. Neurologic: A/Ox2-3, soft spoken, following commands that he can physically follow  Psychiatric: normal affect, no hallucinations  Other:               PROBLEM LIST / PAST MEDICAL / SOCIAL HX     Patient Active Problem List   Diagnosis Code    Quadriplegia ([facility] G82.50    Flexion contractures M24.50    Sacral wound S31.000A    Nephrolithiasis N20.0    Hepatitis C virus infection cured after antiviral drug therapy Z86.19    Hydronephrosis N13.30    S/P  shunt Z98.2    UTI (urinary tract infection) N39.0    DNR (do not resuscitate) Z66    Hematuria R31.9    Anemia D64.9    Dysphagia R13.10    Hypokalemia E87.6    Goals of care, counseling/discussion Z71.89    ACP (advance care planning) Z71.89    PEG (percutaneous endoscopic gastrostomy) status ([facility] Z93.1    Decubitus ulcer of sacral region, stage 2 L89.152    Gastroesophageal reflux disease without esophagitis K21.9    OAB (overactive bladder) N32.81    Aspiration into respiratory tract T17.908A    Pressure injury, stage 2, with suspected deep tissue injury L89.92      No family history on file.   Social History     Socioeconomic History    Marital status:      Spouse name: Not on file    Number of children: Not on file    Years of education: Not on file    Highest education level: Not on file   Tobacco Use    Smoking status: Former Smoker    Smokeless tobacco: Never Used   Substance and Sexual Activity    Alcohol use: No    Drug use: No    Sexual activity: Not Currently   Other Topics Concern        MEDICATIONS & PRESCRIPTIONS     No Known Allergies   Current Outpatient Medications   Medication Sig    Lactose-Free Food with Fiber (JEVITY 1.5 MARLI) 0.06 gram-1.5 kcal/mL liqd 2 AM 1 afternoon and 2 in the evening (total  - 8oz container)    oxybutynin (DITROPAN) 5 mg/5 mL syrup Take 5 mL by mouth three (3) times daily for 90 days.  ergocalciferol (ERGOCALCIFEROL) 50,000 unit capsule Take 1 Cap by mouth Every Thursday.  traMADol (ULTRAM) 50 mg tablet Take 75 mg by mouth every Tuesday and Friday.  bisacodyl (DULCOLAX) 10 mg suppository Insert 10 mg into rectum two (2) times a week. Tuesday and Friday    baclofen (LIORESAL) 10 mg tablet TAKE 1 TABLET BY MOUTH THREE TIMES DAILY    miconazole (ZEASORB AF) 2 % topical powder Apply 1 g to affected area two (2) times daily as needed for Itching.  Menthol-Zinc Oxide (CALMOSEPTINE) 0.44-20.6 % oint Apply 1 Each to affected area daily as needed. apply to back and buttocks daily and as needed for soiling     No current facility-administered medications for this visit. No orders of the defined types were placed in this encounter.         TEST DATA REVIEWED:     Lab Results   Component Value Date/Time    Hemoglobin A1c 5.1 05/24/2019 04:02 AM     No results found for: MCACR, MCA1, MCA2, MCA3, MCAU, MCAU2, MCALPOCT  No results found for: TSH, TSH2, TSH3, TSHP, TSHEXT, TSHEXT  Lab Results   Component Value Date/Time    VITAMIN D, 25-HYDROXY 25.3 (L) 10/23/2018 03:13 PM         Lab Results   Component Value Date/Time    WBC 7.9 05/22/2019 04:48 AM    HGB 8.9 (L) 05/22/2019 04:48 AM    PLATELET 075 90/01/7546 04:48 AM     Lab Results   Component Value Date/Time    Sodium 134 (L) 05/24/2019 04:02 AM    Potassium 4.5 05/24/2019 04:02 AM    Chloride 104 05/24/2019 04:02 AM    CO2 26 05/24/2019 04:02 AM    BUN 13 05/24/2019 04:02 AM    Creatinine 0.69 (L) 05/24/2019 04:02 AM    Calcium 8.0 (L) 05/24/2019 04:02 AM    Magnesium 2.3 05/22/2019 04:48 AM    Phosphorus 3.4 05/22/2019 04:48 AM      Lab Results   Component Value Date/Time    AST (SGOT) 14 (L) 05/20/2019 06:14 AM    Alk.  phosphatase 85 05/20/2019 06:14 AM    Protein, total 6.9 05/20/2019 06:14 AM    Albumin 2.2 (L) 05/22/2019 04:48 AM    Globulin 4.9 (H) 05/20/2019 06:14 AM     Lab Results   Component Value Date/Time    Iron 22 (L) 05/16/2019 02:30 PM    TIBC 175 (L) 05/16/2019 02:30 PM    Iron % saturation 13 (L) 05/16/2019 02:30 PM    Ferritin 213 05/16/2019 02:30 PM

## 2019-08-28 NOTE — PATIENT INSTRUCTIONS
Pressure Injuries: Care Instructions  Your Care Instructions    A pressure injury on the skin is caused by constant pressure to that area. These injuriesalso called decubitus ulcers or bedsoresmay happen when you lie in bed or sit in a wheelchair for a long time. The constant pressure blocks the blood supply to the skin. This causes skin cells to die and creates a sore. Pressure injuries usually occur over bony areas, such as the hips, lower back, elbows, heels, and shoulders. They also can occur in places where the skin folds over on itself. You may have mild redness or open sores that are harder to heal.  Good care at home can help heal pressure injuries. You or your caregiver needs to check your skin every day for sores. You need good nutrition and plenty of fluids to keep your skin healthy and prevent new pressure injuries. Follow-up care is a key part of your treatment and safety. Be sure to make and go to all appointments, and call your doctor if you are having problems. It's also a good idea to know your test results and keep a list of the medicines you take. How can you care for yourself at home? · If your doctor prescribed a medicated ointment or cream, use it exactly as prescribed. Call your doctor if you think you are having a problem with your medicine. · Wash pressure injuries every day, or as often as your doctor recommends. Most tap water is safe, but follow the advice of your doctor or nurse. He or she may recommend that you use a saline solution. This is a salt and water solution that you can buy over the counter. · Put on bandages as your doctor or wound care specialist says. · Keep healthy tissue around the sore clean and dry. · Check your skin every day for sores (or have a caregiver do it). · If you know what is causing the pressure that caused the sore, find a way to remove that pressure.   To prevent pressure injuries  · Change your position or have your caregiver help you change your position often. You may need to do this every 2 hours if you are in bed or every 15 minutes if you are in a wheelchair. This lowers the chance of making sores worse and getting new sores. · Use special mattresses or other support. These may include low-pressure mattresses or cushions made of foam that can be filled with air, water, beads, or fiber. · Eat healthy foods with plenty of protein to help heal damaged skin and to help new skin grow. · Try to stay at a healthy weight. Being overweight can lead to more pressure on your skin. · Do not slide across sheets or slump in a chair or bed. · Do not smoke. Smoking dries the skin and reduces its blood supply. If you need help quitting, talk to your doctor about stop-smoking programs and medicines. These can increase your chances of quitting for good. When should you call for help? Call your doctor now or seek immediate medical care if:    · You have signs of infection, such as:  ? Increased pain, swelling, warmth, or redness. ? Red streaks leading from the sore. ? Pus draining from the sore. ? A fever.    Watch closely for changes in your health, and be sure to contact your doctor if:    · Your pressure injuries are not healing.     · You have new pressure injuries.     · You need help changing positions in bed or in a chair.     · Your caregiver needs help to move you. Where can you learn more? Go to http://rob-gill.info/. Enter F114 in the search box to learn more about \"Pressure Injuries: Care Instructions. \"  Current as of: September 26, 2018  Content Version: 12.1  © 9181-3428 [employer]. Care instructions adapted under license by [employer] (which disclaims liability or warranty for this information).  If you have questions about a medical condition or this instruction, always ask your healthcare professional. [address] any warranty or liability for your use of this information.

## 2019-09-12 NOTE — TELEPHONE ENCOUNTER
LVM for patient's wife return call as needed - advised that OV note was faxed to [employer] and he should be ok to keep the bed.   Advised to return call with any questions or concerns

## 2019-09-12 NOTE — TELEPHONE ENCOUNTER
Ms. Tapan Blackwell is calling to check on air mattress. She would like a call back with an update. Advised nurse would call her back.
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
## 2019-10-03 NOTE — PROGRESS NOTES
Left message on patient's answering machine to see if they can change appointment to Monday Oct 7 at 9:30 or between 3 and 5. Will wait on return call.

## 2019-10-07 NOTE — PROGRESS NOTES
Home Based Primary Care [facility]) & Supportive Services   
[phone] NOTE: Home Based Primary Care is a PROVIDER (MD/NP) based interdisciplinary practice (RN, LCSW, Pharmacy, Dietician) for patient's who cannot access (or have a taxing effort) primary / speciality medical care in an office setting. [facility] is NOT [employer] but works with [facility]. when there is a skilled need. Our MD/NPs are integral in Care Transitions; PLEASE CALL [phone] if this patient arrives in the Emergency Department or Hospital.  
  
 
 
Date of Current Visit: 10/07/19 Patient/Family present for Current Visit: wife Goals of Care as stated by the patient/family is: to be as healthy as possible and comfortable Preferences for hospitalization if that were to be necessary: 
[] Patient DOES NOT WANT hospitalization; focus all treatments at home 
[x] Patient WOULD WANT hospitalization for potentially reversible causes Patient prefers hospitalization at: [facility] Is this encounter billed on time:NO Total time:  
Counseling / coordination time: 
> 50% counseling / coordination?:  
 
ASSESSMENT & PLAN Diagnoses and all orders for this visit: 1. Decubitus ulcer of sacral region, stage 2 (HCC) / Pressure injury, stage 2, with suspected deep tissue injury ([address] -Unable to visualize wound due to inability to position patient for visualization. 
-Wound care supported by home health 
-Reinforced offloading pressure with frequent repositioning and offloading pressure every 2 hours 2. PEG (percutaneous endoscopic gastrostomy) status ([address] d/t Dysphagia 
-Repeat modified barium swallow study ordered, instruction in scheduling provided to patient and wife, this was previously scheduled however transportation remains an ongoing challenge.   Wife reports that she is working on transportation services and will notify us when she is able to secure transportation and appointment for modified barium swallow study 
-Continue tube feeds as ordered at this time 3. Quadriplegia ([address] 
-As noted above #3 
--Patient to continue air mattress as he is high risk for ongoing skin breakdown and recurrent pressure injury 4. Candidal dermatitis  
-Continue Zeasorb to site 
-Instructions provided to wife to contact if rash appears worse or has significant change 
-Avoid prolonged contact with moisture against skin and allow air as much as tolerated to site 5. Healthcare maintenance  
-We will coordinate care for influenza vaccine with [facility] 
-We will follow-up on FIT test results I have left a SUMMARY / INSTRUCTIONS from today's visit with the patient/family in the home HEALTH MAINTENANCE Health Maintenance Due Topic Date Due  
 FOBT Q 1 YEAR AGE 50-75  10/22/2002 CC & SUBJECTIVE including ROS & FUNCTION Chief Complaint Patient presents with  Wound Check  Rash  Dysphagia  
  follow up PEG status Marybel Olmstead is a 79 y.o. with chronic medical conditions as listed in the patient's updated Problem List (see below) Their Subjective Information provided as today's visit includes:  
 
Obtained mainly from wife and supplemented by patient- 
 
Patient is reportedly doing well. Seen today for evaluation of ongoing chronic buttock wound as well as reports of new rash to mid back in which wife describes as yeast like. She is currently using Zeasorb powder to site. Wife and caregivers do support patient and repositioning during the day as well as he is on a specialized air mattress. Pain well controlled with tramadol. Modified barium swallow study was ordered and in progress but on hold due to patient's transportation challenges. The wife plans to inform our office once arrangements have been made. He is tolerating his tube feeds via PEG tube. Continues regular bowel movement schedule every Tuesday and Thursday. Positive ROS findings: Rash, buttock wound The following systems were [x] reviewed / [] unable to be reviewed Systems: constitutional, ears/nose/mouth/throat, respiratory, gastrointestinal, genitourinary, musculoskeletal, integumentary, neurologic, psychiatric, endocrine. PPS:20% [place]:  
Timed Up and Go (TUG): N/A Fall Risk Assessment, last 12 mths 7/16/2019 Able to walk? No  
 
 
Current Durable Medical Equipment in Home: 
[x] Hospital Bed 
[] Bedside Commode 
[x] Wheelchair 
[x] Marge Faith 
[] Jeremias Nickels 
[] Respiratory Support: 
 
ADVANCE CARE PLANNING The following information was updated I/ reviewed as accurate in Orders and the Advance Care Planning Navigator: 
FULL CODE Primary Decision Maker: Sher Burnette - [phone] Advance Care Planning 5/11/2019 Patient's Healthcare Decision Maker is: Named in scanned ACP document Confirm Advance Directive Yes, on file Does the patient have other document types Power of [place]; Do Not Resuscitate VITAL SIGNS & PHYSICAL EXAM  
 
Median Arm Circumference (inches): Wt Readings from Last 3 Encounters:  
05/25/19 184 lb 12.8 oz (83.8 kg) 02/07/17 152 lb (68.9 kg) Temp Readings from Last 3 Encounters:  
10/07/19 98.7 °F (37.1 °C) (Temporal) 10/01/19 97.6 °F (36.4 °C)  
09/24/19 98.2 °F (36.8 °C) (Temporal) BP Readings from Last 3 Encounters:  
10/07/19 110/82  
10/01/19 110/76  
09/24/19 110/68 Pulse Readings from Last 3 Encounters:  
10/07/19 71  
10/01/19 90  
09/24/19 76  
 
 
(Constitutional) Patient Physical Status: visibly debilitated  male, laying in hospital bed, NAD Pacemaker: N/A 
ICD:N/A Feeding Tube:N/A 
Urine Catheter: In-dwelling arias catheter Other: PEG TUBE Eyes: pupils equal, anicteric, conjunctiva pink ENMT: no nasal discharge, moist mucous membranes, pharynx clear, soft voice Neck:  Trachea midline, No JVD, THM, or LAD 
 Cardiovascular: regular rhythm, no M/R/G, distal pulses intact, no edema Respiratory: breathing not labored, symmetric, CTA bilat, decreased in the bases Gastrointestinal: + bowel sounds, soft, non-tender, non-distended; PEG intact Musculoskeletal: contractures all 4 extremities, tenderness to palpation left knee below patella Skin: warm, dry. Candidiasis noticed to upper back between scapula site. Stage II wounds to bilateral buttock but are unable to be visualized due to intact dressing  
neurologic: A/Ox2-3, soft spoken, following commands that he can physically follow Psychiatric: normal affect, no hallucinations Other: PROBLEM LIST / PAST MEDICAL / SOCIAL HX Patient Active Problem List  
Diagnosis Code  Quadriplegia [address] G82.50  Flexion contractures M24.50  Sacral wound S31.000A  Nephrolithiasis N20.0  Hepatitis C virus infection cured after antiviral drug therapy Z86.19  
 Hydronephrosis N13.30  
 S/P  shunt Z98.2  
 UTI (urinary tract infection) N39.0  DNR (do not resuscitate) [phone]  Hematuria R31.9  Anemia D64.9  Dysphagia R13.10  Hypokalemia E87.6  Goals of care, counseling/discussion Z71.89  
 ACP (advance care planning) Z71.89  
 PEG (percutaneous endoscopic gastrostomy) status ([address] Z93.1  Decubitus ulcer of sacral region, stage 2 ([address] F55.156  Gastroesophageal reflux disease without esophagitis K21.9  
 OAB (overactive bladder) N32.81  
 Aspiration into respiratory tract T17.908A  Pressure injury, stage 2, with suspected deep tissue injury ([address] L89.92 No family history on file. Social History Socioeconomic History  Marital status:  Spouse name: Not on file  Number of children: Not on file  Years of education: Not on file  Highest education level: Not on file Tobacco Use  Smoking status: Former Smoker  Smokeless tobacco: Never Used Substance and Sexual Activity  Alcohol use:  No  
  Drug use: No  
 Sexual activity: Not Currently Other Topics Concern MEDICATIONS & PRESCRIPTIONS No Known Allergies Current Outpatient Medications Medication Sig  Lactose-Free Food with Fiber (JEVITY 1.5 MARLI) 0.06 gram-1.5 kcal/mL liqd 2 AM 1 afternoon and 2 in the evening (total  - 8oz container)  ergocalciferol (ERGOCALCIFEROL) 50,000 unit capsule Take 1 Cap by mouth Every Thursday.  traMADol (ULTRAM) 50 mg tablet Take 75 mg by mouth every Tuesday and Friday.  bisacodyl (DULCOLAX) 10 mg suppository Insert 10 mg into rectum two (2) times a week. Tuesday and Friday  baclofen (LIORESAL) 10 mg tablet TAKE 1 TABLET BY MOUTH THREE TIMES DAILY  miconazole (ZEASORB AF) 2 % topical powder Apply 1 g to affected area two (2) times daily as needed for Itching.  Menthol-Zinc Oxide (CALMOSEPTINE) 0.44-20.6 % oint Apply 1 Each to affected area daily as needed. apply to back and buttocks daily and as needed for soiling No current facility-administered medications for this visit. No orders of the defined types were placed in this encounter. TEST DATA REVIEWED:  
 
Lab Results Component Value Date/Time Hemoglobin A1c 5.1 05/24/2019 04:02 AM  
 
No results found for: Creig Burgos, MCA2, MCA3, MCAU, MCAU2, MCALPOCT No results found for: TSH, TSH2, TSH3, TSHP, TSHEXT, TSHEXT Lab Results Component Value Date/Time VITAMIN D, 25-HYDROXY 25.3 (L) 10/23/2018 03:13 PM  
   
 
Lab Results Component Value Date/Time WBC 7.9 05/22/2019 04:48 AM  
 HGB 8.9 (L) 05/22/2019 04:48 AM  
 PLATELET 101 06/27/4226 04:48 AM  
 
Lab Results Component Value Date/Time  Sodium 134 (L) 05/24/2019 04:02 AM  
 Potassium 4.5 05/24/2019 04:02 AM  
 Chloride 104 05/24/2019 04:02 AM  
 CO2 26 05/24/2019 04:02 AM  
 BUN 13 05/24/2019 04:02 AM  
 Creatinine 0.69 (L) 05/24/2019 04:02 AM  
 Calcium 8.0 (L) 05/24/2019 04:02 AM  
 Magnesium 2.3 05/22/2019 04:48 AM  
 Phosphorus 3.4 05/22/2019 04:48 AM  
  
Lab Results Component Value Date/Time AST (SGOT) 14 (L) 05/20/2019 06:14 AM  
 Alk. phosphatase 85 05/20/2019 06:14 AM  
 Protein, total 6.9 05/20/2019 06:14 AM  
 Albumin 2.2 (L) 05/22/2019 04:48 AM  
 Globulin 4.9 (H) 05/20/2019 06:14 AM  
 
Lab Results Component Value Date/Time  Iron 22 (L) 05/16/2019 02:30 PM  
 TIBC 175 (L) 05/16/2019 02:30 PM  
 Iron % saturation 13 (L) 05/16/2019 02:30 PM  
 Ferritin 213 05/16/2019 02:30 PM

## 2019-10-08 NOTE — PATIENT INSTRUCTIONS
Spinal Cord Injury (Quadriplegic): Care Instructions Your Care Instructions A spinal cord injury happens when a bone in your spine cuts or presses on the spinal cord. This stops communication between the brain and the rest of the body. The closer the injury is to the head, the more the body is affected. Serious damage to the spinal cord in the neck can lead to not being able to use your arms and legs. This is called quadriplegia. First, your doctor will try to prevent more damage to your spine and spinal cord. You may get braces, casts, or straps. You may also get medicines for swelling. Sometimes surgery is used. Surgery can remove bone. It can also stabilize or straighten the spine. It can be scary and overwhelming to realize you are paralyzed. There is a lot to learn, but things usually get easier with practice and support. Don't be afraid to get support from family, friends, and counselors. You also can do things at home to feel better. A spinal cord injury changes some things forever. But you can still have a full and rewarding life. Follow-up care is a key part of your treatment and safety. Be sure to make and go to all appointments, and call your doctor if you are having problems. It's also a good idea to know your test results and keep a list of the medicines you take. How can you care for yourself at home? It may be awhile before you go home. You will learn many things during your treatment. Rehabilitation is training and therapy that helps you regain function and relearn skills that were lost as a result of your injury. Rehab begins in the hospital. A rehab team that includes doctors and nurses and physical, occupational, speech, and recreational therapists will help you with daily living. When you do go home, these things may help: 
· Get emotional help if you need it.  Discuss your concerns with your doctor, counselor, psychiatrist, or other health professional. 
 · Learn to take care of your bladder so you do not get a urinary tract infection. You or a caregiver may need to put a thin tube into your bladder to drain the urine regularly. This tube is called a catheter. · Make a bowel management program with your doctor or other health professional. This will help you have regular bowel movements. · Learn how to prevent lung problems. You may want to get shots to prevent flu and pneumococcal infection. If you have trouble coughing, have someone help you with an assisted cough. This is done by having another person press just above your belly button while you cough. Practice deep breathing. This can strengthen the muscles that help you breathe. · Have a caregiver check your body regularly for signs of pressure injuries on your skin. These injuries can be slow to heal and may get infected. They usually occur on bony areas like the knees, hips, heels, or tailbone. They can also occur in skin folds. Change your position often. This can help prevent these injuries. · Look for signs of a common problem called autonomic dysreflexia. This can happen when your body can't control blood pressure. It can cause headaches, nausea, and a slow heart rate. It can also cause cold, clammy skin and sweating. · Do the exercises that your therapist recommends. Strong muscles can help you do everyday activities. · Join a support group. Talking about your injury with other people who have problems like yours can help you learn to live with a spinal injury. When should you call for help? Call 911 anytime you think you may need emergency care. For example, call if: 
  · You have signs of a common problem called autonomic dysreflexia and the symptoms do not go away after 20 minutes. These include: ? A pounding headache. ? A flushed face or red patches on your skin above the level of the spinal injury. ? Sweating above the level of the spinal injury. ? Nausea. ? Slow heart rate. ? Cold, clammy skin above the level of the spinal injury.  
 Call your doctor now or seek immediate medical care if: 
  · You have trouble breathing.  
  · You have signs of infection, such as: 
? Increased pain, swelling, warmth, or redness. ? Red streaks leading from an incision. ? Pus draining from an incision. ? A fever.  
  · Your urine is cloudy or smells bad.  
  · You need help with bathroom functions such as urination and bowel movements.  
  · You have pressure injuries.  
  · You feel hopeless and depressed.  
 Watch closely for changes in your health, and be sure to contact your doctor if you have any problems. Where can you learn more? Go to http://rob-gill.info/. Enter [id number] in the search box to learn more about \"Spinal Cord Injury (Quadriplegic): Care Instructions. \" Current as of: March 28, 2019 Content Version: 12.2 © 3747-0022 [employer]. Care instructions adapted under license by [employer] (which disclaims liability or warranty for this information). If you have questions about a medical condition or this instruction, always ask your healthcare professional. [place] [address] any warranty or liability for your use of this information.

## 2019-10-09 NOTE — TELEPHONE ENCOUNTER
Informed patient's wife that lab does not have stool sample. Pt will wait to provide sample when [employer] starts utilizing [facility] lab.

## 2019-10-28 NOTE — TELEPHONE ENCOUNTER
Pt's wife called to get a refill on her 's medication. She said he needs a refill on Baclofen 10 mg and it needs to go to the [facility].

## 2019-11-25 PROBLEM — A41.9 SEPSIS (HCC): Status: ACTIVE | Noted: 2019-01-01

## 2019-11-25 PROBLEM — N20.0 NEPHROLITHIASIS: Status: RESOLVED | Noted: 2019-01-01 | Resolved: 2019-01-01

## 2019-11-25 NOTE — PROGRESS NOTES
[facility]: MED RECONCILIATION Information obtained from: Patient's spouse Allergies: Patient has no known allergies. Prior to Admission Medications:  
 
Medication Documentation Review Audit Reviewed by Vandana Rangel PHARMD (Pharmacist) on 11/25/19 at 8189 Medication Sig Documenting Provider Last Dose Status Taking?  
baclofen (LIORESAL) 10 mg tablet TAKE 1 TABLET BY MOUTH THREE TIMES DAILY Angeles Tanner NP 11/24/2019 Active Yes  
bisacodyl (DULCOLAX) 10 mg suppository Insert 10 mg into rectum two (2) times a week. Tuesday and Friday Provider, Historical  Active Yes  
ergocalciferol (ERGOCALCIFEROL) 50,000 unit capsule Take 50,000 Units by mouth every Tuesday. Provider, Historical  Active Yes Lactose-Free Food with Fiber (JEVITY 1.5 MARLI) 0.06 gram-1.5 kcal/mL liqd 2 AM 1 afternoon and 2 in the evening (total  - 8oz container) Nick Ortega NP  Active Yes  
miconazole (ZEASORB AF) 2 % topical powder Apply 1 g to affected area two (2) times a week. Tuesday and Friday Provider, Historical  Active Yes  
oxybutynin (DITROPAN) 5 mg/5 mL syrup Take 5 mg by mouth three (3) times daily. Provider, Historical  Active Yes  
traMADol (ULTRAM) 50 mg tablet Take 100 mg by mouth once as needed for Pain. Provider, Historical  Active Yes [address], [place]   Contact: [phone]

## 2019-11-25 NOTE — TELEPHONE ENCOUNTER
Vignesh Solano is calling stating that everytime patient takes a breathe it hurt on the left side. Wife is asking NP to come back to check on patient.

## 2019-11-25 NOTE — PROGRESS NOTES
Home Based Primary Care Formerly [facility]) & Supportive Services   
[phone] NOTE: Home Based Primary Care is a PROVIDER (MD/NP) based interdisciplinary practice (RN, LCSW, Pharmacy, Dietician) for patient's who cannot access (or have a taxing effort) primary / speciality medical care in an office setting. hospitals is NOT [employer] but works with [employer] when there is a skilled need. Our MD/NPs are integral in Care Transitions; PLEASE CALL [phone] if this patient arrives in the Emergency Department or Hospital.  
  
 
 
Date of Current Visit: 11/25/19 Patient/Family present for Current Visit: wife Goals of Care as stated by the patient/family is: to be as healthy as possible and comfortable Preferences for hospitalization if that were to be necessary: 
[] Patient DOES NOT WANT hospitalization; focus all treatments at home 
[x] Patient WOULD WANT hospitalization for potentially reversible causes Patient prefers hospitalization at: [facility] Is this encounter billed on time:YES Total time: 60min (time include both encounters this am) Counseling / coordination time: See plan/discussion below 
> 50% counseling / coordination?: YES 
 
ASSESSMENT & PLAN Diagnoses and all orders for this visit: 1. PEG (percutaneous endoscopic gastrostomy) status (HCC) w/  Acute abdominal pain & Abdominal distension - Called back to home shortly after leaving for acute onset abdominal distension and pain. -  PEG tube checked for residual which there was none. Scant gastric contents. Last tube  feed was last night 
- Hale patent - Rectal vault does have stool which is reported baseline by wife as he is on a bowel program Tues /Friday. - Discussion w/ wife and patient. STAT testing is not feasible in the home setting to have back in a timely manner. 
- Due to acute onsent in nature, recommended ER eval. Patient and wife in agreement - Of note, follow up for dysphagia w/ PEG tube secondary to transportation barrier. Would recommend if possible completion of MBS while in patient should he be admitted REPORT CALLED T: Bindu Weber, RN @ [facility] ER as provided unable to take call Remained present during follow up encounter to assist and provide report to EMS. 2. Quadriplegia ([address]  Pressure injury, stage 2, with suspected deep tissue injury ([address] -Wound care supported by home health, currently covered w/ dressing 
-Reinforced offloading pressure with frequent repositioning and offloading pressure every 2 hours - Continue specialty air mattress 
 
-3. Indwelling Hale catheter present - Supported and changed by [address] shooted to r/o out cause for abd distension. 4. Neuromuscular respiratory weakness - Suspected weakness due to quadriplegia - Discussed supporting mucous clearing w/ Acapella or [place] I have left a SUMMARY / INSTRUCTIONS from today's visit with the patient/family in the home HEALTH MAINTENANCE There are no preventive care reminders to display for this patient. CC & SUBJECTIVE including ROS & FUNCTION Chief Complaint Patient presents with  Neurologic Problem  Dysphagia  Other MUCOUS / Resp. weakness Annette Stoll is a 81 y.o. with chronic medical conditions as listed in the patient's updated Problem List (see below) Their Subjective Information provided as today's visit includes:  
 
Obtained mainly from wife and supplemented by patient- 
 
Patient is reportedly doing well. Seen today for evaluation of ongoing chronic buttock wound and dysphagia w/ PEG tube. Transportation remains an ongoing barrier to follow up MBS. Wife and caregivers do support patient and repositioning during the day as well as he is on a specialized air mattress. Pain well controlled with tramadol. e.  He is tolerating his tube feeds via PEG tube.   Continues regular bowel movement schedule every Tuesday and Friday (scheduled bowel program). Called back by wife due to sudden on set abdominal distension and pain that began shortly after scheduled encounter. Sudden onset in nature with reported localization to left side. Worse with he inhales. Has not had AM feeding this morning. There has been no nausea or vomiting. Review of Systems Constitutional: Negative. HENT: Negative. Eyes: Negative. Respiratory: Positive for cough (occasional). Mucous Cardiovascular: Negative. Gastrointestinal: Positive for abdominal pain (acute onset w/ distension, intermittently localizes to left side, worse while inhaling breath). Negative for nausea and vomiting. Genitourinary: Negative. Musculoskeletal: Negative. Skin: Negative. Neurological: Negative. Endo/Heme/Allergies: Negative. Psychiatric/Behavioral: Negative. PPS:20% [place]:  
Timed Up and Go (TUG): N/A Fall Risk Assessment, last 12 mths 7/16/2019 Able to walk? No  
 
 
Current Durable Medical Equipment in Home: 
[x] Hospital Bed 
[] Bedside Commode 
[x] Wheelchair 
[x] Charissa Ilana 
[] Brittney Bread 
[] Respiratory Support: 
 
ADVANCE CARE PLANNING The following information was updated I/ reviewed as accurate in Orders and the Advance Care Planning Navigator: DDNR on file Primary Decision Maker: Malik Valdez - Vasile - [phone] Advance Care Planning 5/11/2019 Patient's Healthcare Decision Maker is: Named in scanned ACP document Confirm Advance Directive Yes, on file Does the patient have other document types Power of [place]; Do Not Resuscitate VITAL SIGNS & PHYSICAL EXAM  
 
Median Arm Circumference (inches): Wt Readings from Last 3 Encounters:  
05/25/19 184 lb 12.8 oz (83.8 kg) 02/07/17 152 lb (68.9 kg) Temp Readings from Last 3 Encounters:  
11/25/19 98.5 °F (36.9 °C) (Temporal) 11/08/19 98.2 °F (36.8 °C) (Temporal) 10/31/19 98.2 °F (36.8 °C) (Temporal) BP Readings from Last 3 Encounters:  
11/25/19 119/74  
11/08/19 122/70  
10/31/19 110/68 Pulse Readings from Last 3 Encounters:  
11/25/19 74  
11/08/19 76  
10/31/19 76  
 
 
(Constitutional) Patient Physical Status: visibly debilitated  male, laying in hospital bed, NAD Pacemaker: N/A 
ICD:N/A Feeding Tube:N/A 
Urine Catheter: In-dwelling arias catheter Other: PEG TUBE Physical Exam 
Vitals signs reviewed. Constitutional:   
   Appearance: Normal appearance. Comments: appears uncomfortable/in pain HENT:  
   Head: Normocephalic and atraumatic. Nose: Nose normal.  
   Mouth/Throat:  
   Mouth: Mucous membranes are moist.  
Eyes:  
   Extraocular Movements: Extraocular movements intact. Conjunctiva/sclera: Conjunctivae normal.  
Neck: Musculoskeletal: Neck supple. No muscular tenderness. Comments: Limited neck ROM Cardiovascular:  
   Rate and Rhythm: Normal rate. Rhythm irregular. Pulses: Normal pulses. Heart sounds: Normal heart sounds. No murmur. No friction rub. No gallop. Pulmonary:  
   Effort: Pulmonary effort is normal. No respiratory distress. Breath sounds: Normal breath sounds. No wheezing or rhonchi. Comments: Scattered rhonchi that clears w/ coughing Abdominal:  
   General: There is distension. Tenderness: There is tenderness. There is no right CVA tenderness or left CVA tenderness. Comments: Initial exam non-distended, NTTP, PEG clamped. FOLLLOW UP EXAM - new abd distention, + tympanic, PEG check neg for residual TF only scant gastiric contents, hypoactive bowel sounds Genitourinary: 
   Comments: Arias patent, rectal vault + stool, negative for abraham blood on exam, external hemorrhoid present Musculoskeletal:  
   Comments: BUE contractures, bilat foot drop, quadraplegic Skin: 
   General: Skin is warm and dry. Capillary Refill: Capillary refill takes less than 2 seconds. Comments: Sacrum covered w/ mepilex dressing Neurological:  
   Mental Status: He is alert. Mental status is at baseline. Comments: [place], quiet soft speak, at times difficult to understand but at baseline Psychiatric:     
   Mood and Affect: Mood normal.     
   Behavior: Behavior normal.     
   Thought Content: Thought content normal.     
   Judgment: Judgment normal.  
 
 
 
 
 PROBLEM LIST / PAST MEDICAL / SOCIAL HX Patient Active Problem List  
Diagnosis Code  Quadriplegia ([facility] [facility] G82.50  Flexion contractures M24.50  Sacral wound S31.000A  Nephrolithiasis N20.0  Hepatitis C virus infection cured after antiviral drug therapy Z86.19  
 Hydronephrosis N13.30  
 S/P  shunt Z98.2  
 UTI (urinary tract infection) N39.0  DNR (do not resuscitate) [id number]  Hematuria R31.9  Anemia D64.9  Dysphagia R13.10  Hypokalemia E87.6  Goals of care, counseling/discussion Z71.89  
 ACP (advance care planning) Z71.89  
 PEG (percutaneous endoscopic gastrostomy) status ([facility] [address] Z93.1  Decubitus ulcer of sacral region, stage 2 ([address] P35.725  Gastroesophageal reflux disease without esophagitis K21.9  
 OAB (overactive bladder) N32.81  
 Aspiration into respiratory tract T17.908A  Pressure injury, stage 2, with suspected deep tissue injury ([address] L89.92 No family history on file. Social History Socioeconomic History  Marital status:  Spouse name: Not on file  Number of children: Not on file  Years of education: Not on file  Highest education level: Not on file Tobacco Use  Smoking status: Former Smoker  Smokeless tobacco: Never Used Substance and Sexual Activity  Alcohol use: No  
 Drug use: No  
 Sexual activity: Not Currently Other Topics Concern MEDICATIONS & PRESCRIPTIONS No Known Allergies Current Outpatient Medications Medication Sig  
  baclofen (LIORESAL) 10 mg tablet TAKE 1 TABLET BY MOUTH THREE TIMES DAILY  Lactose-Free Food with Fiber (JEVITY 1.5 MARLI) 0.06 gram-1.5 kcal/mL liqd 2 AM 1 afternoon and 2 in the evening (total  - 8oz container)  ergocalciferol (ERGOCALCIFEROL) 50,000 unit capsule Take 1 Cap by mouth Every Thursday.  traMADol (ULTRAM) 50 mg tablet Take 75 mg by mouth every Tuesday and Friday.  bisacodyl (DULCOLAX) 10 mg suppository Insert 10 mg into rectum two (2) times a week. Tuesday and Friday  miconazole (ZEASORB AF) 2 % topical powder Apply 1 g to affected area two (2) times daily as needed for Itching.  Menthol-Zinc Oxide (CALMOSEPTINE) 0.44-20.6 % oint Apply 1 Each to affected area daily as needed. apply to back and buttocks daily and as needed for soiling No current facility-administered medications for this visit. No orders of the defined types were placed in this encounter. TEST DATA REVIEWED:  
 
Lab Results Component Value Date/Time Hemoglobin A1c 5.1 05/24/2019 04:02 AM  
 
No results found for: [place], MCA2, MCA3, MCAU, MCAU2, MCALPOCT No results found for: TSH, TSH2, TSH3, TSHP, TSHEXT, TSHEXT Lab Results Component Value Date/Time VITAMIN D, 25-HYDROXY 25.3 (L) 10/23/2018 03:13 PM  
   
 
Lab Results Component Value Date/Time WBC 7.9 05/22/2019 04:48 AM  
 HGB 8.9 (L) 05/22/2019 04:48 AM  
 PLATELET 773 16/39/9504 04:48 AM  
 
Lab Results Component Value Date/Time Sodium 134 (L) 05/24/2019 04:02 AM  
 Potassium 4.5 05/24/2019 04:02 AM  
 Chloride 104 05/24/2019 04:02 AM  
 CO2 26 05/24/2019 04:02 AM  
 BUN 13 05/24/2019 04:02 AM  
 Creatinine 0.69 (L) 05/24/2019 04:02 AM  
 Calcium 8.0 (L) 05/24/2019 04:02 AM  
 Magnesium 2.3 05/22/2019 04:48 AM  
 Phosphorus 3.4 05/22/2019 04:48 AM  
  
Lab Results Component Value Date/Time AST (SGOT) 14 (L) 05/20/2019 06:14 AM  
 Alk.  phosphatase 85 05/20/2019 06:14 AM  
 Protein, total 6.9 05/20/2019 06:14 AM  
 Albumin 2.2 (L) 05/22/2019 04:48 AM  
 Globulin 4.9 (H) 05/20/2019 06:14 AM  
 
Lab Results Component Value Date/Time  Iron 22 (L) 05/16/2019 02:30 PM  
 TIBC 175 (L) 05/16/2019 02:30 PM  
 Iron % saturation 13 (L) 05/16/2019 02:30 PM  
 Ferritin 213 05/16/2019 02:30 PM

## 2019-11-25 NOTE — H&P
[address] 
[phone] Admission History and Physical 
 
 
NAME:  Manisha Sahne :   1950 MRN:  [id number] PCP:  Raheem Sky NP Date/Time:  2019 Subjective: CHIEF COMPLAINT: abdominal pain HISTORY OF PRESENT ILLNESS:    
Mr. Caryle Beams is a 81 y.o. with h/o quadriplegia, CSF shunt who presents with abdominal pain. Wife noted pain started today on left side. He has a chronic arias last change was on the . He is on tube feeds due to chronic dysphagia. Past Medical History:  
Diagnosis Date  Disorder of brainstem Bruised brainstem  Double vision  Frequent UTI  History of vascular access device 2019 [facility] 4Fr Midline 11IH Right Basilic for poor access  Kidney stones  Quadriplegia ([facility] [address] Past Surgical History:  
Procedure Laterality Date  HX CSF SHUNT  HX CSF SHUNT  HX ORTHOPAEDIC Left  HX OTHER SURGICAL    
 bladder stone Social History Tobacco Use  Smoking status: Former Smoker  Smokeless tobacco: Never Used Substance Use Topics  Alcohol use: No  
  
 
FH Unable to obtain, pt is non verbal  
 
No Known Allergies Prior to Admission medications Medication Sig Start Date End Date Taking? Authorizing Provider  
ergocalciferol (ERGOCALCIFEROL) 50,000 unit capsule Take 50,000 Units by mouth every Tuesday. Yes Provider, Historical  
oxybutynin (DITROPAN) 5 mg/5 mL syrup Take 5 mg by mouth three (3) times daily. Yes Provider, Historical  
baclofen (LIORESAL) 10 mg tablet TAKE 1 TABLET BY MOUTH THREE TIMES DAILY 10/28/19  Yes Raheem Sky NP Lactose-Free Food with Fiber (JEVITY 1.5 MARLI) 0.06 gram-1.5 kcal/mL liqd 2 AM 1 afternoon and 2 in the evening (total  - 8oz container) 19  Yes Gerda Ortega NP  
traMADol (ULTRAM) 50 mg tablet Take 100 mg by mouth once as needed for Pain.   Yes Provider, Historical  
bisacodyl (DULCOLAX) 10 mg suppository Insert 10 mg into rectum two (2) times a week. Tuesday and Friday   Yes Provider, Historical  
miconazole (ZEASORB AF) 2 % topical powder Apply 1 g to affected area two (2) times a week. Tuesday and Friday   Yes Provider, Historical  
 
 
 
Review of Systems: 
Unable to obtain pt is non verbal 
 
 
  
Objective: VITALS:   
Vital signs reviewed; most recent are: 
 
Visit Vitals /63 Pulse 83 Temp 98.2 °F (36.8 °C) Resp 20 Ht 6' (1.829 m) Wt 83.5 kg (184 lb) SpO2 97% BMI 24.95 kg/m² SpO2 Readings from Last 6 Encounters:  
11/25/19 97% 11/25/19 95% 11/08/19 98% 10/31/19 98% 10/24/19 98% 10/15/19 97% Intake/Output Summary (Last 24 hours) at 11/25/2019 1858 Last data filed at 11/25/2019 6043 Gross per 24 hour Intake 1605 ml Output  Net 1605 ml Exam:  
 
Physical Exam: 
 
Gen:  chroncially ill appearing, in no acute distress HEENT:  Pink conjunctivae, PERRL, hearing intact to voice, moist mucous membranes Neck:  Supple, without masses, thyroid non-tender Resp:  No accessory muscle use, clear breath sounds without wheezes rales or rhonchi 
Card:  No murmurs, normal S1, S2 without thrills, bruits or peripheral edema Abd:  Soft, mild left sided abdominal pain, non-distended, normoactive bowel sounds are present, no palpable organomegaly Lymph:  No cervical adenopathy Musc:  No cyanosis or clubbing Skin:  No rashes or ulcers, skin turgor is good Neuro:  Contractured. Moves extremities, responsive Psych:  Alert with poor insight. not Oriented to person, place, and time Labs: 
 
Recent Labs  
  11/25/19 
1348 WBC 23.5* HGB 13.8 HCT 43.6  Recent Labs  
  11/25/19 
1348 * K 4.9  CO2 22 * BUN 18 CREA 0.74 CA 9.4 ALB 3.4* SGOT 19 ALT 25 No components found for: [place] No results for input(s): PH, PCO2, PO2, HCO3, FIO2 in the last 72 hours. No results for input(s): INR, INREXT in the last 72 hours. Chest Xray:  Pleural effusion Assessment/Plan:   
 
Severe sepsis: 2/2 UTI. Lactate elevated, will send repeat. Continue supportive care with IVF, IV abx UTI: due to indwelling arias catheter. POA. Arias has been changed. Urine and blood cultures sent. Prior urine cultures resistant to pcn. Start cefepime Pleural effusion; CT chest ordered. Pt is at risk for aspiration, as such will start cefepime and flagyl. Once CT chest complete can de-escalate abx if no signs of aspiration pna present Active Problems: 
  Quadriplegia / Sp  shunt / flexion contractures: POA. Severe and chronic. Resume home meds Dysphagia: sp PEG tube. Wife has requested speech consult. Will ask nutrition to see for TF Surrogate decision maker: wife Total time spent with patient: 70 Minutes Care Plan discussed with: Patient and Family Discussed:  Care Plan Prophylaxis:  Lovenox Probable Disposition:  Home w/Family 
        
___________________________________________________ Attending Physician: Pauline Melgar MD

## 2019-11-25 NOTE — TELEPHONE ENCOUNTER
Pt was just seen by Rani Puga. Pt's wife called and stated that the patient is currently experiencing major abdomen pain and is crying because of how much it hurts. She is unsure what is causing it, she wants to know if Rani Puga would be able to come back and see him. Please call pt's wife at [phone].

## 2019-11-25 NOTE — ED TRIAGE NOTES
Pt arrives via EMS for c/o left sided abdominal pain. Pt was seen by NP at home and called 911 for distended abdomen and abdominal pain. Pt has a peg tube; skin clean dry and intact around the site.

## 2019-11-25 NOTE — PATIENT INSTRUCTIONS

## 2019-11-25 NOTE — ED PROVIDER NOTES
60-year-old male with a history of quadriplegia, TBI after prior [facility], status post PEG tube placement, presents to the emergency department for evaluation of a reported 1 day history of left-sided upper abdominal pain and abdominal distention. He denies any associated nausea or vomiting. States that last bowel movement was on Friday and was reportedly normal for him. Denies any lower abdominal pain. Patient has an indwelling Hale catheter. Denies any fever, chills, vomiting, chest pain, shortness of breath, or any other medical concerns. History is provided by EMS as reported by the nurse at the bedside and provided by the patient. History is slightly limited secondary to patient's prior TBI and he is able to answer simple yes/no questions but unable to thoroughly describe his symptoms. Patient's wife also presents to the bedside later on in his ED stay in provides a history that recently he has been hiccuping and states \"he just does not really hiccup ever. \" 
 
 
  
 
Past Medical History:  
Diagnosis Date  Disorder of brainstem Bruised brainstem  Double vision  Frequent UTI  History of vascular access device 05/17/2019 [facility] 4Fr Midline 66YE Right Basilic for poor access  Kidney stones  Quadriplegia ([address] Past Surgical History:  
Procedure Laterality Date  HX CSF SHUNT  HX CSF SHUNT  HX ORTHOPAEDIC Left  HX OTHER SURGICAL    
 bladder stone No family history on file. Social History Socioeconomic History  Marital status:  Spouse name: Not on file  Number of children: Not on file  Years of education: Not on file  Highest education level: Not on file Occupational History  Not on file Social Needs  Financial resource strain: Not on file  Food insecurity:  
  Worry: Not on file Inability: Not on file  Transportation needs:  
  Medical: Not on file Non-medical: Not on file Tobacco Use  
  Smoking status: Former Smoker  Smokeless tobacco: Never Used Substance and Sexual Activity  Alcohol use: No  
 Drug use: No  
 Sexual activity: Not Currently Lifestyle  Physical activity:  
  Days per week: Not on file Minutes per session: Not on file  Stress: Not on file Relationships  Social connections:  
  Talks on phone: Not on file Gets together: Not on file Attends [employer] service: Not on file Active member of club or organization: Not on file Attends meetings of clubs or organizations: Not on file Relationship status: Not on file  Intimate partner violence:  
  Fear of current or ex partner: Not on file Emotionally abused: Not on file Physically abused: Not on file Forced sexual activity: Not on file Other Topics Concern [address] Service Not Asked  Blood Transfusions Not Asked  Caffeine Concern Not Asked  Occupational Exposure Not Asked Sherlean Dimitrios Hazards Not Asked  Sleep Concern Not Asked  Stress Concern Not Asked  Weight Concern Not Asked  Special Diet Not Asked  Back Care Not Asked  Exercise Not Asked  Bike Helmet Not Asked  Seat Belt Not Asked  Self-Exams Not Asked Social History Narrative  Not on file ALLERGIES: Patient has no known allergies. Review of Systems Constitutional: Positive for activity change. Negative for appetite change, chills and fever. HENT: Negative for congestion, rhinorrhea, sinus pain, sneezing and sore throat. Eyes: Negative for photophobia and visual disturbance. Respiratory: Negative for cough and shortness of breath. Cardiovascular: Negative for chest pain. Gastrointestinal: Positive for abdominal distention and abdominal pain. Negative for blood in stool, constipation, diarrhea, nausea and vomiting. Genitourinary: Negative for difficulty urinating, dysuria, flank pain, hematuria, penile pain and testicular pain. Musculoskeletal: Negative for arthralgias, back pain, myalgias and neck pain. Skin: Negative for rash and wound. Neurological: Negative for syncope, weakness, light-headedness, numbness and headaches. Psychiatric/Behavioral: Negative for self-injury and suicidal ideas. All other systems reviewed and are negative. Vitals:  
 11/25/19 1530 11/25/19 1545 11/25/19 1600 11/25/19 1645 BP:  134/79 129/73 146/81 Pulse:      
Resp:      
Temp:      
SpO2: 95% 95%  94% Weight:      
Height:      
      
 
Physical Exam 
Vitals signs and nursing note reviewed. Constitutional:   
   General: He is not in acute distress. Appearance: He is well-developed. He is not diaphoretic. Comments: Chronically ill-appearing, quadriplegic, with chronic spasticity in all 4 extremities HENT:  
   Head: Normocephalic and atraumatic. Nose: Nose normal.  
Eyes:  
   Conjunctiva/sclera: Conjunctivae normal.  
   Pupils: Pupils are equal, round, and reactive to light. Neck: Musculoskeletal: Neck supple. Cardiovascular:  
   Rate and Rhythm: Normal rate and regular rhythm. Heart sounds: Normal heart sounds. Pulmonary:  
   Effort: Pulmonary effort is normal.  
   Breath sounds: Rales (left lower lobe) present. Abdominal:  
   General: There is distension. Palpations: Abdomen is soft. Tenderness: There is tenderness in the left upper quadrant and left lower quadrant. Comments: PEG tube in place in left upper quadrant with no surrounding erythema or purulent drainage. Musculoskeletal:     
   General: No tenderness. Skin: 
   General: Skin is warm and dry. Neurological:  
   Mental Status: He is alert and oriented to person, place, and time. Mental status is at baseline. GCS: GCS eye subscore is 4. GCS verbal subscore is 5. GCS motor subscore is 6. Cranial Nerves: No cranial nerve deficit. Sensory: No sensory deficit.   
   Coordination: Coordination normal.  
 Comments: Quadriplegic with chronic contractures of all 4 extremities, slow, dysarthric speech at neurologic baseline MDM 
 72-year-old male presents with abdominal distention and left upper quadrant abdominal pain. Patient arrives initially with vital signs stable but during his evaluation in the ED was seen to become hypoxic while at rest on room air dropping down to 87%. He was placed on 2 L by nasal cannula and improved back up to the mid 90s. Multiple attempts were made to establish peripheral IV access even with ultrasound guidance which were unsuccessful so PICC line was placed, as the patient has required these previously. Labs returned showing very significant leukocytosis of 23.5, normal renal function, LFTs. CT abdomen pelvis was done to further evaluate, but returned showing no significant abnormalities. Hale catheter was replaced in the ED. UA returned showing evidence of UTI. Patient has a significant history of these previously. Urine cultures ordered. Chest x-ray was also done, viewed by myself and read by radiology showing left lung pneumonia versus asymmetric edema. Pelvis could potentially be the etiology for his abdominal pain symptoms, versus pyelonephritis. He was given IV antibiotics. While being evaluated in the emergency department the patient was seen to become progressively more tachycardic so lactic acid was drawn with concern for developing sepsis. Lactic acid returned elevated at 3.15, code sepsis was called. Broad-spectrum antibiotics were given and 30 cc/kg IV fluid bolus was given. CT chest was ordered to further evaluate. He was admitted to the hospitalist for further evaluation. Total critical care time spent exclusive of procedures:  50 minutes Procedures 1448 EKG shows sinus tachycardia with a rate of 102 bpm, nonspecific ST depression laterally but no associated ST elevation. Hospitalist [place] Text for Admission 5:34 PM 
 
ED Room Number: FN94/81 Patient Name and age:  Marvin Alvarez 81 y.o.  male Working Diagnosis: 1. Pneumonia of left lower lobe due to infectious organism [facility]) 2. Pyelonephritis 3. Leukocytosis, unspecified type 4. Hypoxia 5. Quadriplegia ([facility] [address] 6. Sepsis with acute hypoxic respiratory failure without septic shock, due to unspecified organism ([facility] Readmission: no 
Isolation Requirements:  no 
Recommended Level of Care:  telemetry Code Status:  DNR Other: Quadriplegic presents with abdominal distention and left upper quadrant abdominal pain. CT abdomen pelvis negative, however UA looks infected, possible pyelonephritis. Also found to have hypoxia on room air, but stable on 2 L by nasal cannula. Chest x-ray shows left lower lobe pneumonia. Getting IV abx, CT chest ordered to further eval.  
Department:[facility] ED - [phone]

## 2019-11-26 NOTE — PROGRESS NOTES
11/25/19 1935 Critical Result Types Type of Critical Result Point of Care Test  
Critical Point of Care Test Result Types Critical Point of Care Test Result Type Lactic Acid Lactic Acid POC Result 4.01 Notification Information Notified By (Name) Tech Time of Critical Result Notification 5509 Verbal Readback Provided Yes Provider Notification Was Provider Notified Yes Name of Provider Escobar Fernandes Provider Gave Verbal Readback Yes Time Provider Notified 3278 Date Provider Notified 11/25/19 Were Orders Received No 
(MD CAME TO PT BEDSIDE- continue fluids, reassess lactic)

## 2019-11-26 NOTE — PROGRESS NOTES
Sepsis Bundle Patient Reason for Admission:   Sepsis RRAT Score:          13 Plan for utilizing home health:      Pt is currently open to [facility] home health. Current Advanced Directive/Advance Care Plan: DNR, AD is on file. Transition of Care Plan:       
Pt lives with his wife Flora Lyles [phone]. Pt uses a ramp to enter the home when needed. NP Kenny Barbosa sees the patient in his home. Pt has prescription coverage under his insurance plan. He gets his medications filled at [facility]. Tube Feedings and supplies are from [address] - feeds are continuous. Pt has a arias, last changed on 11/22. Pt has assistance on bowel program days which are two times a week. DME - Shower chair, hospital bed, air mattress, suction and a track lift that has descends from the ceiling. Pt will need ambulance transport home. Plan 1. Home with family assistance. 2. Resume home health through [facility]. 3. Follow up with NP.  
4. CM will continue to follow and assist with discharge planning. 5. Patient will need ambulance transport - ramp. Care Management Interventions PCP Verified by CM: Kuldeep Artis NP comes to the pt's home ) Transition of Care Consult (CM Consult): Home Health [address]: Yes Areli Signup: No 
Discharge Durable Medical Equipment: No 
Health Maintenance Reviewed: Yes Physical Therapy Consult: No 
Occupational Therapy Consult: No 
Speech Therapy Consult: Yes Current Support Network: Lives with Spouse Confirm Follow Up Transport: Other (see comment)(Ambulance ) Discharge Location Discharge Placement: Home with home health DALI Castro

## 2019-11-26 NOTE — ED NOTES
Verbal report given to [address] (name) on Virgle Frankel being transferred to hold nurse in ED (unit) for routine progression of care Report consisted of patient's Situation, Background, Assessment and Recommendations (SBAR) Information from the following report(s)  SBAR, Kardex, ED Summary, Procedure Summary, Intake/Output, MAR, Accordion, Recent Results, Med Rec Status and Cardiac Rhythm SInus Tach  was reviewed with the receiving nurse. Opportunity for questions and clarification was provided. Patient transported with:  Monitor Last Filed Values: 
Temp: 98.2 °F (36.8 °C) (11/25/19 1322) Pulse (Heart Rate): (!) 123 (11/25/19 1907) Resp Rate: 18 (11/25/19 1907) O2 Sat (%): 96 % (11/25/19 1907) BP: 135/67 (11/25/19 1900) MAP (Monitor): 85 (11/25/19 1900) MAP (Calculated): 103 (11/25/19 1322) Level of Consciousness: Alert (11/25/19 1322) Lab Results Component Value Date/Time WBC 23.5 (H) 11/25/2019 01:48 PM  
 
 
Repeat LA:  Time Due now Blood Cultures Drawn:  yes Fluid Resuscitation:  Total needed n/a , Status infusing 2505 ml All Antibiotics Started:  yes, Dose Due see MAR  
 
VS x 2 post-fluid resuscitation:   no 
 
Vasopressor Infusion:  no n/a Provider Reassessment needed and notified:  yes , Due after fluids finished infusing Additional Interventions/Comments:  N/a

## 2019-11-26 NOTE — PROGRESS NOTES
[address] Dosing Services: Antimicrobial Stewardship Progress Note Consult for antibiotic dosing of vancomycin by Dr. Suzanne Cunningham Pharmacist reviewed antibiotic appropriateness for 81year old , male  for indication of severe sepsis, PNA Day of Therapy 1 Plan: 
Vancomycin therapy: 
Start Vancomycin therapy, with loading dose of 2000 mg given 11/25 @ 1833. Follow with maintenance dose of 1000 mg (12mg/kg) every 12 hours. Dose calculated to approximate a therapeutic trough of 15-20 mcg/mL. Last trough level / Plan for level: Trough prior to 4th dose (not ordered). Pharmacy to follow daily and will make changes to dose and/or frequency based on clinical status. FYI: Quadriplegia-starting with more conservative dosing (12mg/kg) and will adjust as needed. Date Dose & Interval Measured (mcg/mL) Extrapolated (mcg/mL) ? ? ? ?  
? ? ? ?  
? ? ? ? Other Antimicrobial 
(not dosed by pharmacist) Cefepime 2g IV every 8 hours Metronidazole 500mg IV every 12 hours Cultures 11/25 Blood: NGTD X 12h (prelim) 11/25 Urine: in process Culture history: 
5/11/19 Blood: proteus (pan sens) 5/11/19 Urine: proteus, e. Coli (pan sens) Serum Creatinine Lab Results Component Value Date/Time Creatinine 0.60 (L) 11/26/2019 12:16 AM  
   
Creatinine Clearance Estimated Creatinine Clearance: 112.4 mL/min (A) (by C-G formula based on SCr of 0.6 mg/dL (L)). Procalcitonin  No results found for: PCT Temp 98.9 °F (37.2 °C) WBC Lab Results Component Value Date/Time WBC 21.7 (H) 11/26/2019 12:16 AM  
   
H/H Lab Results Component Value Date/Time HGB 12.0 (L) 11/26/2019 12:16 AM  
  
Platelets Lab Results Component Value Date/Time PLATELET 497 24/54/2988 12:16 AM  
 
  
 
 
Pharmacist: Signed Javier Sloan Contact information: [phone]

## 2019-11-26 NOTE — WOUND CARE
Wound care consult: 
Initial visit: Consulted for \"healing wound on sacrum, low emerita. \" 
 
Patient lying in bed, no distress. Assessment All skin folds and bony prominences assessed, turned with staff assistance. All wounds and skin conditions present on admission. Bilateral buttocks: Mepilex sacrum removed to reveal blanching erythema to buttocks and partial thickness wounds about 0.5cm x 0.5cm on the left and 1cm x 1cm on the right. Both wound bases are red with small amount of serous drainage. Nathalie-wound is intact. Proximal right lower leg: Removed a dry dressing below knee to reveal a large brown scab to skin. Scab was loose and therefore taken off to reveal partial thickness skin loss. Wound bed is red/pink and moist. No drainage noted. Nathalie-wound is intact. Treatment Bilateral buttocks: Cleanse wounds with moisture barrier wipes and apply Zinc barrier ointment (Orange top). Perform twice daily or as needed. Proximal right lower leg: Cleanse with saline and apply Foam dressing (Mepilex). Perform EVERY THREE DAYS or as needed. Repositioned in bed Prevalon boots to heels Recommendations/Plan Low air-loss bed ordered Mobility team on board Turn, reposition every 2 hours as tolerated, float heels, place in prevalon boots. Incontinent care with comfort shields. Apply Zinc to all open areas, moisture barrier as needed. Reconsult as needed.

## 2019-11-26 NOTE — PROGRESS NOTES
[facility] Dosing Services: 11/26/19 Pharmacist Dosing Progress Note for cefepime Physician Dr. Conchita Russ The following medication: cefepime was automatically dose-adjusted per [facility] P&T Committee Protocol, with respect to renal function. Consult provided for this   81 y.o. , male , for the indication of CAP/UTI/sepsis. Pt Weight:  
Wt Readings from Last 1 Encounters:  
11/26/19 82 kg (180 lb 12.8 oz) Previous Regimen Cefepime 1g IV every 8 hours Serum Creatinine Lab Results Component Value Date/Time Creatinine 0.60 (L) 11/26/2019 12:16 AM  
   
Creatinine Clearance Estimated Creatinine Clearance: 112.4 mL/min (A) (by C-G formula based on SCr of 0.6 mg/dL (L)). BUN Lab Results Component Value Date/Time BUN 16 11/26/2019 12:16 AM  
   
Dosage changed to:  Cefepime 2g IV every 8 hours based on indication of sepsis per protocol Pharmacy to continue to monitor patient daily. Will make dosage adjustments based upon changing renal function. Signed Javier Sloan. Contact information:  [phone]

## 2019-11-26 NOTE — PROGRESS NOTES
Speech Therapy Orders received and chart reviewed. Note patient has a history of dysphagia with multiple MBS studies completed at this facility. Most recent: 5/20/19 showing, \"Based on the objective data described below, the patient failed his third MBS in a week. He continued to present with weakness in swallow and BOT, resulting in pharyngeal residue after the swallow. He had a mild delay in swallow, with trace, consistent deep penetration with thins that were ultimately aspirated. He had additional aspiration from pharyngeal residue. Although he is more alert and talkative than last week, no change in swallow function. Patient will need more permanent alternative feeding at this time and [facility] SLP therapy for swallow function exercisess. \"  
Patient now has PEG. Has been working with home health therapy. Per chart, they have been working toward repeat MBS, though transportation has been a barrier to outpatient studies. Given history of silent aspiration, MBS is warranted. Discussed with MD who reports multiple medical issues and plan to wait on MBS until more medically stable. MD to order when patient is ready. Until that time, will follow peripherally. Thank you. Victorina Markham M.S., CCC-SLP

## 2019-11-26 NOTE — PROGRESS NOTES
1212: Gave update to pt's wife via phone. Wife to come to see pt after eye procedure she has scheduled today. 1344: Pt's wife @ bedside. Requesting to speak w/ MD Vel Brown about specific bowel regimen & disimpaction, MD to call wife. 1612: Pt's wife insisted pt is disimpacted prior to starting TF. Wife disimpacted pt as she does at home, formed green stool removed. TF started. 1930: Bedside and Verbal shift change report given to [address] (oncoming nurse) by Bria GAYLE (offgoing nurse). Report included the following information SBAR, Kardex, Intake/Output, Recent Results and Cardiac Rhythm ST.

## 2019-11-26 NOTE — PROGRESS NOTES
VAT Note - Following chart for vascular access need. Pt currently with functional 20 ga 2.25\" accucath

## 2019-11-26 NOTE — PROGRESS NOTES
Nutrition Assessment: 
 
RECOMMENDATIONS/INTERVENTION(S):  
1. TF Recommendations: 
400ml bolus Jevity 1.5 TID with 225ml H20 flush q6h. (Goal TF will provide 1800kcals, 76gm pro, 1812ml fluid - this will meet 91% pt's kcal needs, 92% protein needs) 2. Will monitor tolerance to TF, GI for BM's, weight. ASSESSMENT:  
11/26: 80 yo male admitted for sepsis. RD assessment for MD consult: TF management. PMhx: quadriplegia, PEG secondary to chronic dysphagia (placed 5/21/19). Weight is WNL per BMI per age. Currently NPO. Noted that pt has failed multiple MBS's in past but his wife is wanting it repeated. SLP note indicates MBS can be completed once pt is medically stable. Pt is able to speak but very difficult to understand. Wife present, states his home TF regimen is 5 cans of Jevity 1.5 /day, usually 3 cans in the morning and 2 cans at night. Follows those with water flushes which she is unable to quantify, states it is 10 syringes full in the morning and 8 in the evening but does not know size of syringe. Receives TF supplies from [facility]. States pt tolerates this TF regimen at home. They follow a specific bowel regimen at home: suppository is given every Tuesday and Friday so he has BM's on those days only. Noted to have distended abd yesterday, wife states it is better today. Pt still having some abd pain as he grimaces from time to time. Wife thinks pt's weight has been stable since having Peg placed but they are unable to weigh him at home. Weight hx in EMR indicates current weight is down 4lbs since PEG placement (6 months ago) - not significant. Labs reviewed. Meds: dulcolax. NaCl running at 75ml/hr. Partial thickness wound noted to bilateral buttocks. Diet Order: NPO 
% Eaten:  No data found. Pertinent Medications: [x] Reviewed Labs: [x] Reviewed Anthropometrics: Height: 6' (182.9 cm) Weight: 82 kg (180 lb 12.8 oz)  IBW (%IBW):   ( ) UBW (%UBW):   (  %)   
 
 BMI: Body mass index is 24.52 kg/m². This BMI is indicative of: 
 [] Underweight    [x] Normal    [] Overweight    []  Obesity    []  Extreme Obesity (BMI>40) Estimated Nutrition Needs (Based on): 1960 Kcals/day(REE 1633 x AF 1.2) , 82 g(82-90gm (1-1.1gm/kg/d)) Protein Carbohydrate: At Least 130 g/day  Fluids: 1960 mL/day (1 ml/kcal) Last BM: 11/22   [x]Active     []Hyperactive  []Hypoactive       [] Absent   BS Skin:    [] Intact   [] Incision  [x] Breakdown - partial thickness wound to bilateral buttocks  [] DTI   [] Tears/Excoriation/Abrasion  []Edema [] Other: Wt Readings from Last 30 Encounters:  
11/26/19 82 kg (180 lb 12.8 oz) 05/25/19 83.8 kg (184 lb 12.8 oz) 02/07/17 68.9 kg (152 lb) NUTRITION DIAGNOSES:  
Problem:  Swallowing difficulty Etiology: related to motor causes Signs/Symptoms: as evidenced by multiple failed MBS, now with PEG for EN NUTRITION INTERVENTIONS: 
  Enteral/Parenteral Nutrition: Initiate enteral nutrition GOAL:  
Meet > 75% EEN's with EN with no residuals > 250ml within next 4-6 days Cultural, Orthodox, or Ethnic Dietary Needs: None EDUCATION & DISCHARGE NEEDS:  
 [x] None Identified 
 [] Identified and Education Provided/Documented 
 [] Identified and Pt declined/was not appropriate [x] Interdisciplinary Care Plan Reviewed/Documented  
 [x] Discharge Needs:   Resume home TF regimen: 240ml Jevity 1.5 5x/day 
 [] No Nutrition Related Discharge Needs NUTRITION RISK:  
Pt Is At Nutrition Risk  [x] No Nutrition Risk Identified  [] PT SEEN FOR:  
 [x]  MD Consult: []Calorie Count []Diabetic Diet Education []Diet Education []Electrolyte Management []General Nutrition Management and Supplements [x]Management of Tube Feeding []TPN Recommendations []  RN Referral:  []MST score >=2 
   []Enteral/Parenteral Nutrition PTA []Pregnant: Gestational DM or Multigestation [] Pressure Ulcer 
 
[]  Low BMI      []  Length of Stay       [] Dysphagia Diet         [] Ventilator 
[]  Follow-up Previous Recommendations: 
 [] Implemented          [] Not Implemented          [x] Not Applicable Previous Goal: 
 [] Met              [] Progressing Towards Goal              [] Not Progressing Towards Goal   [x] Not Applicable Cassie Schimdt RD Pager [phone] Phone [phone]

## 2019-11-26 NOTE — CONSULTS
Name: [facility]: [address]  
: 1950 Admit Date: 2019 Phone: [phone]  Room: [facility]/01 PCP: Jitendra Sheriff NP  MRN: [id number] Date: 2019  Code: DNR Chart and notes reviewed. Data reviewed. I review the patient's current medications in the medical record at each encounter. I have evaluated and examined the patient. HPI: 
 
1:24 PM    
 
History was obtained from chart review. I was asked by Fred Tineo MD to see Georgette Walter in consultation for a chief complaint of abnormal CT chest.   
 
History of Present Illness: Mr. Baron Buck is a 82 yo woman with a history of quadriplegia, brainstem disorder, CSF shunt, chronic arias, and chronic tube feeds who presented with abdominal pain and is admitted with a UTI and found to have a pulmonary mass on CT. He is unable to provide history and I attempted to call his wife for additional information, but she did not answer. Appears comfortable without observed dyspnea, no abdominal pain on palpation. Febrile to 100.7 overnight. Labs reviewed: WBC 21.7 (23.5 on admission), lactic acid 1.4 (from 2.8) Images: 
CT chest with L pleural based mass Past Medical History:  
Diagnosis Date  Disorder of brainstem Bruised brainstem  Double vision  Frequent UTI  History of vascular access device 2019 [facility] 4Fr Midline 70[place] Right Basilic for poor access  Kidney stones  Quadriplegia ([address] Past Surgical History:  
Procedure Laterality Date  HX CSF SHUNT  HX CSF SHUNT  HX ORTHOPAEDIC Left  HX OTHER SURGICAL    
 bladder stone No family history on file. Social History Tobacco Use  Smoking status: Former Smoker  Smokeless tobacco: Never Used Substance Use Topics  Alcohol use: No  
 
 
No Known Allergies Current Facility-Administered Medications Medication Dose Route Frequency  cefepime (MAXIPIME) 2 g in 0.9% sodium chloride (MBP/ADV) 100 mL  2 g IntraVENous Q8H  
 vancomycin (VANCOCIN) 1,000 mg in 0.9% sodium chloride (MBP/ADV) 250 mL  1,000 mg IntraVENous Q12H  
 sodium chloride (NS) flush 5-10 mL  5-10 mL IntraVENous PRN  
 sodium chloride (NS) flush 5-40 mL  5-40 mL IntraVENous Q8H  
 sodium chloride (NS) flush 5-40 mL  5-40 mL IntraVENous PRN  
 acetaminophen (TYLENOL) tablet 650 mg  650 mg Oral Q4H PRN  
 ondansetron (ZOFRAN) injection 4 mg  4 mg IntraVENous Q4H PRN  
 enoxaparin (LOVENOX) injection 40 mg  40 mg SubCUTAneous Q24H  
 0.9% sodium chloride infusion  75 mL/hr IntraVENous CONTINUOUS  
 metroNIDAZOLE (FLAGYL) IVPB premix 500 mg  500 mg IntraVENous Q12H  
 bisacodyl (DULCOLAX) suppository 10 mg  10 mg Rectal EVERY TU & FRI  traMADol (ULTRAM) tablet 100 mg  100 mg Oral ONCE PRN  
 baclofen (LIORESAL) tablet 10 mg  10 mg Per G Tube TID  oxybutynin (DITROPAN) tablet 5 mg  5 mg Per G Tube [facility] REVIEW OF SYSTEMS  
12 point ROS negative except as stated in the HPI. Physical Exam:  
Visit Vitals /56 (BP 1 Location: Left arm, BP Patient Position: At rest) Pulse 98 Temp 97.6 °F (36.4 °C) Resp 18 Ht 6' (1.829 m) Wt 82 kg (180 lb 12.8 oz) SpO2 95% BMI 24.52 kg/m² General:  Alert, cooperative, no distress Head:  Normocephalic Eyes:  Conjunctivae/corneas clear. Nose: Nares normal. Septum midline. Mucosa normal.   
Throat: Lips, mucosa, and tongue normal.   
Neck: Supple, symmetrical, trachea midline Lungs:   Clear to auscultation bilaterally. Respirations non-labored. Chest wall:  No tenderness or deformity. Heart:  Regular rate and rhythm, S1, S2 normal, no murmur, click, rub or gallop. Abdomen:   Soft, non-tender. Bowel sounds normal.  
Extremities: Extremities normal, atraumatic, no cyanosis or edema. Pulses: 2+ and symmetric all extremities. Skin: Skin color, texture, turgor normal. No rashes or lesions Lymph nodes: Cervical, supraclavicular nodes normal.  
Neurologic: Alert, no acute focal abnormalities, quadriplegic Lab Results Component Value Date/Time Sodium 139 11/26/2019 12:16 AM  
 Potassium 4.8 11/26/2019 12:16 AM  
 Chloride 109 (H) 11/26/2019 12:16 AM  
 CO2 22 11/26/2019 12:16 AM  
 BUN 16 11/26/2019 12:16 AM  
 Creatinine 0.60 (L) 11/26/2019 12:16 AM  
 Glucose 134 (H) 11/26/2019 12:16 AM  
 Calcium 7.9 (L) 11/26/2019 12:16 AM  
 Magnesium 2.3 05/22/2019 04:48 AM  
 Phosphorus 3.4 05/22/2019 04:48 AM  
 Lactic acid 1.4 11/26/2019 09:15 AM  
 
 
Lab Results Component Value Date/Time WBC 21.7 (H) 11/26/2019 12:16 AM  
 HGB 12.0 (L) 11/26/2019 12:16 AM  
 PLATELET 635 40/27/6808 12:16 AM  
 MCV 94.0 11/26/2019 12:16 AM  
 
 
Lab Results Component Value Date/Time AST (SGOT) 19 11/25/2019 01:48 PM  
 Alk. phosphatase 103 11/25/2019 01:48 PM  
 Protein, total 9.6 (H) 11/25/2019 01:48 PM  
 Albumin 3.4 (L) 11/25/2019 01:48 PM  
 Globulin 6.2 (H) 11/25/2019 01:48 PM  
 
 
Lab Results Component Value Date/Time Iron 22 (L) 05/16/2019 02:30 PM  
 TIBC 175 (L) 05/16/2019 02:30 PM  
 Iron % saturation 13 (L) 05/16/2019 02:30 PM  
 Ferritin 213 05/16/2019 02:30 PM  
 
 
No results found for: SR, CRP, ELLE, ANAIGG, RA, RPR, RPRT, VDRLT, VDRLS, TSH, TSHEXT No results found for: PH, PHI, PCO2, PCO2I, PO2, PO2I, HCO3, HCO3I, FIO2, FIO2I No results found for: CPK, RCK1, RCK2, RCK3, RCK4, CKNDX, CKND1, TROPT, TROIQ, BNPP, BNP Lab Results Component Value Date/Time Culture result: NO GROWTH AFTER 12 HOURS 11/25/2019 06:06 PM  
 Culture result: NO GROWTH 6 DAYS 05/12/2019 09:29 AM  
 Culture result: PROTEUS MIRABILIS (PREDOMINATING) (A) 05/11/2019 11:37 AM  
 Culture result: ESCHERICHIA COLI (A) 05/11/2019 11:37 AM  
 Culture result: (A) 05/11/2019 11:37 AM  
  PROTEUS MIRABILIS ISOLATED FROM 3 OF 4 BOTTLES DRAWN. ..CL WHITE AND CL BLUE  Culture result: (A) 05/11/2019 11:37 AM  
 PRELIMINARY REPORT OF GRAM NEGATIVE RODS GROWING IN 3 OF 4 BOTTLES DRAWN CALLED TO AND READ BACK BY Sole Ramirez AT 1036 05.12.2019 LAS Culture result: REMAINING BOTTLE(S) HAS/HAVE NO GROWTH IN 5 DAYS 05/11/2019 11:37 AM  
 
 
No results found for: TOXA1, RPR, HBCM, HBSAG, HAAB, HCAB1, HAAT, G6PD, CRYAC, HIVGT, HIVR, HIV1, HIV12, HIVPC, HIVRPI No results found for: VANCT, CPK Lab Results Component Value Date/Time Color YELLOW 11/25/2019 05:25 PM  
 Appearance CLEAR 11/25/2019 05:25 PM  
 Specific gravity 1.005 11/25/2019 05:25 PM  
 pH (UA) 5.0 11/25/2019 05:25 PM  
 Protein NEGATIVE  11/25/2019 05:25 PM  
 Glucose NEGATIVE  11/25/2019 05:25 PM  
 Ketone 15 (A) 11/25/2019 05:25 PM  
 Bilirubin NEGATIVE  11/25/2019 05:25 PM  
 Blood SMALL (A) 11/25/2019 05:25 PM  
 Urobilinogen 1.0 11/25/2019 05:25 PM  
 Nitrites POSITIVE (A) 11/25/2019 05:25 PM  
 Leukocyte Esterase TRACE (A) 11/25/2019 05:25 PM  
 WBC 10-20 11/25/2019 05:25 PM  
 RBC 0-5 11/25/2019 05:25 PM  
 Bacteria NEGATIVE  11/25/2019 05:25 PM  
 
 
IMPRESSION · Abnormal CT, pulmonary mass · UTI · Layne Romansh PLAN 
· Need to discuss possible CT guided biopsy with the patient's wife as she is the decision maker, I have attempted to call her but she had what sounds like a cataract procedure this morning; if she is amenable, can proceed with CT guided biopsy, but would be a poor candidate for any type of intervention · Coags · Management of UTI as per primary service Thank you for allowing us to participate in the care of this patient. We will be happy to follow along in his/her progress with you.  
 
Eriberto Castro MD

## 2019-11-26 NOTE — PROGRESS NOTES
2140: TRANSFER - IN REPORT: 
 
Verbal report received from [facility] (name) on Denver Perry  being received from ED(unit) for routine progression of care Report consisted of patients Situation, Background, Assessment and  
Recommendations(SBAR). Information from the following report(s) SBAR, Kardex, MAR, and Accordion was reviewed with the receiving nurse. Opportunity for questions and clarification was provided. Assessment completed upon patients arrival to unit and care assumed. Pt placed in heel boots and on turn team.  Wound care consulted. 0000: Contacted Dr. Leslie Logan for pain medication, orders received 
0100: Notified Dr. Leslie Logan of critical lactic acid of 2.8, down from 4.01. Orders received to recheck Q4 until less than 2. 
0515: Notified Dr. Leslie Logan of critical lactic acid recheck of 2.3 down from 2.8. Will recheck in 4 hours. Orders received for NT suctioning Q4 d/t pt being unable to cough up secretions blocking airway. 0730: Bedside and Verbal shift change report given to BRITTANEY Chou RN (oncoming nurse) by Perez Clark RN (offgoing nurse). Report included the following information SBAR, Kardex, MAR and Accordion. Problem: Falls - Risk of 
Goal: *Absence of Falls Description Document Devere Holden Fall Risk and appropriate interventions in the flowsheet. Outcome: Progressing Towards Goal 
Note: Fall Risk Interventions: 
  
 
  
 
Medication Interventions: Bed/chair exit alarm Elimination Interventions: Bed/chair exit alarm Problem: Pressure Injury - Risk of 
Goal: *Prevention of pressure injury Description Document Dennis Scale and appropriate interventions in the flowsheet. Outcome: Progressing Towards Goal 
Note: Pressure Injury Interventions: 
Sensory Interventions: Assess changes in LOC, Assess need for specialty bed, Chair cushion, Check visual cues for pain, Keep linens dry and wrinkle-free, Maintain/enhance activity level Activity Interventions: Chair cushion, Increase time out of bed Mobility Interventions: Chair cushion, Float heels, HOB 30 degrees or less Nutrition Interventions: Document food/fluid/supplement intake Friction and Shear Interventions: Apply protective barrier, creams and emollients, Foam dressings/transparent film/skin sealants, HOB 30 degrees or less, Lift sheet, Lift team/patient mobility team

## 2019-11-26 NOTE — PROGRESS NOTES
Re assessed pt for severe sepsis. Lactate noted to be uptrending, pt had not completed full IVF bolus for sepsis management at the time lact sent. Will give additional 500 cc bolus. Repeat lactic Physical Exam: 
 
Gen: Well-developed, well-nourished, in no acute distress HEENT:  Pink conjunctivae, PERRL, hearing intact to voice, moist mucous membranes Neck: Supple, without masses, thyroid non-tender Resp: No accessory muscle use, clear breath sounds without wheezes rales or rhonchi 
Card: No murmurs, normal S1, S2 without thrills, bruits or peripheral edema. 2+ dp pulses Abd:  Soft, non-tender, non-distended, normoactive bowel sounds are present, no palpable organomegaly and no detectable hernias Skin: No rashes or ulcers, skin turgor is good. 2 s cap refill Neuro:  Contractured. Follows some commands Psych:  Poor insight, not oriented to person, place and time

## 2019-11-26 NOTE — PROGRESS NOTES
11/25/19 2045 Vital Signs Temp 98 °F (36.7 °C) Temp Source Oral  
Pulse (Heart Rate) (!) 125 Heart Rate Source Monitor Resp Rate 15  
O2 Sat (%) 96 % Level of Consciousness Alert /80 MAP (Monitor) 88 BP 1 Method Automatic  
BP 1 Location Right arm BP Patient Position At rest  
MEWS Score 3 Oxygen Therapy Pulse via Oximetry 125 beats per minute MEWS OF 3 tachycardia, MD AWARE

## 2019-11-26 NOTE — PROGRESS NOTES
[facility] [address] 
[address] [facility], [address] [place] [address] 
[phone] Medical Progress Note NAME: Jason Wen :  1950 MRM:  [id number] Date/Time: 2019 Assessment / Plan:  
 
  Sepsis: 2/2 PNA and/or UTI. Noted CT chest findings, pleural-based consolidation or mass on the left. Add vancomycin in case abscess/empyema. Continue cefepime/Flagyl. Appreciate pulmonology evaluation. To further discuss with wife/family re: decision on CT-guided biopsy. UTI (urinary tract infection): reviewed prior cultures. Continue IV cefepime Quadriplegia / S/P  shunt / Flexion contractures / Dysphagia: well known to me from admission in May, having failed MBS repeatedly so PEG was placed then. I question if CT findings are result of severe aspiration PNA/empyema. He is not appropriate at this junction to repeat MBS as requested by family. Continue baclofen, bisacodyl, oxybutynin. Continue tube feeds Total time spent: 35 minutes Time spent in the care of this patient including reviewing records, discussing with nursing and/or other providers on the treatment team, obtaining history and examining the patient, and discussing treatment plans. Care Plan discussed with: Patient, Nursing Staff and >50% of time spent in counseling and coordination of care Discussed:  Care Plan and D/C Planning Prophylaxis:  Lovenox Disposition:  Home w/Family Subjective: Chief Complaint:  Follow up lung mass/effusion Chart/notes/labs/studies reviewed, patient examined at bedside. No meaningful history or ROS able to be obtained due to mental status Objective:  
 
 
Vitals:  
 
  
Last 24hrs VS reviewed since prior progress note. Most recent are: 
 
Visit Vitals /56 (BP 1 Location: Left arm, BP Patient Position: At rest) Pulse 98 Temp 97.6 °F (36.4 °C) Resp 18 Ht 6' (1.829 m) Wt 82 kg (180 lb 12.8 oz) SpO2 95% BMI 24.52 kg/m² SpO2 Readings from Last 6 Encounters:  
11/26/19 95% 11/25/19 95% 11/08/19 98% 10/31/19 98% 10/24/19 98% 10/15/19 97% Intake/Output Summary (Last 24 hours) at 11/26/2019 1455 Last data filed at 11/26/2019 1127 Gross per 24 hour Intake 3370 ml Output 450 ml Net 2920 ml Exam:  
 
Physical Exam: 
 
Gen: Elderly, frail, chronically ill-appearing. NAD HEENT:  Pink conjunctivae, hearing intact to voice Neck:  Supple, without masses Resp:  No accessory muscle use, increased WOB. Diminished L basilar breath sounds, few rales/rhonchi 
Card:  No murmurs, normal S1, S2 without thrills, bruits or peripheral edema Abd:  Soft, non-tender, non-distended, reduced bowel sounds are present, no mass. PEG in place. Musc:  No cyanosis or clubbing Skin:  No rashes or ulcers, skin turgor is good Neuro:  Cranial nerves are grossly intact, quadriplegia, follows commands poorly. Contractures. Not verbal 
Psych:  Poor insight, oriented to person Medications Reviewed: (see below) Lab Data Reviewed: (see below) 
 
______________________________________________________________________ Medications:  
 
Current Facility-Administered Medications Medication Dose Route Frequency  cefepime (MAXIPIME) 2 g in 0.9% sodium chloride (MBP/ADV) 100 mL  2 g IntraVENous Q8H  
 vancomycin (VANCOCIN) 1,000 mg in 0.9% sodium chloride (MBP/ADV) 250 mL  1,000 mg IntraVENous Q12H  
 sodium chloride (NS) flush 5-10 mL  5-10 mL IntraVENous PRN  
 sodium chloride (NS) flush 5-40 mL  5-40 mL IntraVENous Q8H  
 sodium chloride (NS) flush 5-40 mL  5-40 mL IntraVENous PRN  
 acetaminophen (TYLENOL) tablet 650 mg  650 mg Oral Q4H PRN  
 ondansetron (ZOFRAN) injection 4 mg  4 mg IntraVENous Q4H PRN  
 enoxaparin (LOVENOX) injection 40 mg  40 mg SubCUTAneous Q24H  
 0.9% sodium chloride infusion  75 mL/hr IntraVENous CONTINUOUS  
  metroNIDAZOLE (FLAGYL) IVPB premix 500 mg  500 mg IntraVENous Q12H  
 bisacodyl (DULCOLAX) suppository 10 mg  10 mg Rectal EVERY TU & FRI  traMADol (ULTRAM) tablet 100 mg  100 mg Oral ONCE PRN  
 baclofen (LIORESAL) tablet 10 mg  10 mg Per G Tube TID  oxybutynin (DITROPAN) tablet 5 mg  5 mg Per G Tube [facility] Lab Review:  
 
Recent Labs  
  11/26/19 
0016 11/25/19 
1348 WBC 21.7* 23.5* HGB 12.0* 13.8 HCT 38.9 43.6  335 Recent Labs  
  11/26/19 
0016 11/25/19 
1348  134* K 4.8 4.9 * 103 CO2 22 22 * 137* BUN 16 18 CREA 0.60* 0.74 CA 7.9* 9.4 ALB  --  3.4* SGOT  --  19 ALT  --  25 No components found for: [place] No results for input(s): PH, PCO2, PO2, HCO3, FIO2 in the last 72 hours. No results for input(s): INR, INREXT in the last 72 hours. Lab Results Component Value Date/Time Specimen Description: URINE 05/25/2009 05:30 PM  
 
Lab Results Component Value Date/Time Culture result: NO GROWTH AFTER 12 HOURS 11/25/2019 06:06 PM  
 Culture result: NO GROWTH 6 DAYS 05/12/2019 09:29 AM  
 Culture result: PROTEUS MIRABILIS (PREDOMINATING) (A) 05/11/2019 11:37 AM  
 Culture result: ESCHERICHIA COLI (A) 05/11/2019 11:37 AM  
 Culture result: (A) 05/11/2019 11:37 AM  
  PROTEUS MIRABILIS ISOLATED FROM 3 OF 4 BOTTLES DRAWN. ..CL WHITE AND CL BLUE Culture result: (A) 05/11/2019 11:37 AM  
  PRELIMINARY REPORT OF GRAM NEGATIVE RODS GROWING IN 3 OF 4 BOTTLES DRAWN CALLED TO AND READ BACK BY GIA KELLEY RN AT 1036 05.12.2019 LAS Culture result: REMAINING BOTTLE(S) HAS/HAVE NO GROWTH IN 5 DAYS 05/11/2019 11:37 AM  
 
        
___________________________________________________ Attending Physician: Hardy Lam MD

## 2019-11-26 NOTE — HOME CARE
Please note that this patient was open to [facility] - [facility], Skilled Nursing services at the time of hospital admission. If services will be needed at discharge, please order to resume them(may also need Speech Therapy). Thank you, Rani Mcdaniel RN [facility] Cell, [phone]

## 2019-11-27 NOTE — PROGRESS NOTES
Name: [facility]  
: 1950 Admit Date: 2019 Phone: [phone]  Room: [facility]/01 PCP: Raheem Sky NP  MRN: [id number] Date: 2019  Code: DNR Chart and notes reviewed. Data reviewed. I review the patient's current medications in the medical record at each encounter. I have evaluated and examined the patient. History of Present Illness: Mr. Caryle Beams is a 82 yo woman with a history of quadriplegia, brainstem disorder, CSF shunt, chronic arias, and chronic tube feeds who presented with abdominal pain and is admitted with a UTI and found to have a pulmonary mass on CT. He is unable to provide history and I attempted to call his wife for additional information, but she did not answer. Appears comfortable without observed dyspnea, no abdominal pain on palpation. Febrile to 100.7 overnight. ROS: 
Feeling ok. Denies SOB, wheezing, or pain. Does have cough with mucous he has difficulty expectorating. Suctioned him in room this morning and able to remove large plug. He felt much better following suction. Overnight Events: 
Afebrile BP and HR stable Oxygen saturations 95% on 2L NC 
WBC trending down HgB 10.2 Images: 
CT chest with L pleural based mass Past Medical History:  
Diagnosis Date  Disorder of brainstem Bruised brainstem  Double vision  Frequent UTI  History of vascular access device 2019 [facility] 4Fr Midline [id number] Right Basilic for poor access  Kidney stones  Quadriplegia ([address] Past Surgical History:  
Procedure Laterality Date  HX CSF SHUNT  HX CSF SHUNT  HX ORTHOPAEDIC Left  HX OTHER SURGICAL    
 bladder stone No family history on file. Social History Tobacco Use  Smoking status: Former Smoker  Smokeless tobacco: Never Used Substance Use Topics  Alcohol use: No  
 
 
No Known Allergies Current Facility-Administered Medications Medication Dose Route Frequency  potassium phosphate 30 mmol in 0.9% sodium chloride 250 mL infusion   IntraVENous ONCE  
 cefepime (MAXIPIME) 2 g in 0.9% sodium chloride (MBP/ADV) 100 mL  2 g IntraVENous Q8H  
 vancomycin (VANCOCIN) 1,000 mg in 0.9% sodium chloride (MBP/ADV) 250 mL  1,000 mg IntraVENous Q12H  
 sodium chloride (NS) flush 5-10 mL  5-10 mL IntraVENous PRN  
 sodium chloride (NS) flush 5-40 mL  5-40 mL IntraVENous Q8H  
 sodium chloride (NS) flush 5-40 mL  5-40 mL IntraVENous PRN  
 acetaminophen (TYLENOL) tablet 650 mg  650 mg Oral Q4H PRN  
 ondansetron (ZOFRAN) injection 4 mg  4 mg IntraVENous Q4H PRN  
 enoxaparin (LOVENOX) injection 40 mg  40 mg SubCUTAneous Q24H  
 0.9% sodium chloride infusion  75 mL/hr IntraVENous CONTINUOUS  
 metroNIDAZOLE (FLAGYL) IVPB premix 500 mg  500 mg IntraVENous Q12H  
 bisacodyl (DULCOLAX) suppository 10 mg  10 mg Rectal EVERY TU & FRI  
 baclofen (LIORESAL) tablet 10 mg  10 mg Per G Tube TID  oxybutynin (DITROPAN) tablet 5 mg  5 mg Per G Tube [facility] REVIEW OF SYSTEMS  
12 point ROS negative except as stated in the HPI. Physical Exam:  
Visit Vitals /55 (BP 1 Location: Left arm, BP Patient Position: At rest) Pulse 92 Temp 98 °F (36.7 °C) Resp 18 Ht 6' (1.829 m) Wt 82 kg (180 lb 12.8 oz) SpO2 95% BMI 24.52 kg/m² General:  Alert, cooperative, no distress Head:  Normocephalic Eyes:  Conjunctivae/corneas clear. Nose: Nares normal. Septum midline. Mucosa normal.   
Throat: Lips, mucosa, and tongue normal.   
Neck: Supple, symmetrical, trachea midline Lungs:   Clear to auscultation bilaterally. Respirations non-labored. Chest wall:  No tenderness or deformity. Heart:  Regular rate and rhythm, S1, S2 normal, no murmur, click, rub or gallop. Abdomen:   Soft, non-tender. Bowel sounds normal.  
Extremities: Extremities normal, atraumatic, no cyanosis or edema. Pulses: 2+ and symmetric all extremities. Skin: Skin color, texture, turgor normal. No rashes or lesions Lymph nodes: Cervical, supraclavicular nodes normal.  
Neurologic: Alert, no acute focal abnormalities, quadriplegic Lab Results Component Value Date/Time Sodium 142 11/27/2019 02:22 AM  
 Potassium 3.6 11/27/2019 02:22 AM  
 Chloride 111 (H) 11/27/2019 02:22 AM  
 CO2 23 11/27/2019 02:22 AM  
 BUN 16 11/27/2019 02:22 AM  
 Creatinine 0.52 (L) 11/27/2019 02:22 AM  
 Glucose 166 (H) 11/27/2019 02:22 AM  
 Calcium 7.7 (L) 11/27/2019 02:22 AM  
 Magnesium 1.9 11/27/2019 02:22 AM  
 Phosphorus 1.3 (L) 11/27/2019 02:22 AM  
 Lactic acid 1.4 11/26/2019 09:15 AM  
 
 
Lab Results Component Value Date/Time WBC 14.1 (H) 11/27/2019 02:22 AM  
 HGB 10.2 (L) 11/27/2019 02:22 AM  
 PLATELET 379 40/73/4457 02:22 AM  
 MCV 93.4 11/27/2019 02:22 AM  
 
 
Lab Results Component Value Date/Time INR 1.3 (H) 11/27/2019 02:22 AM  
 aPTT 29.9 11/27/2019 02:22 AM  
 AST (SGOT) 16 11/27/2019 02:22 AM  
 Alk. phosphatase 69 11/27/2019 02:22 AM  
 Protein, total 6.6 11/27/2019 02:22 AM  
 Albumin 2.2 (L) 11/27/2019 02:22 AM  
 Globulin 4.4 (H) 11/27/2019 02:22 AM  
 
 
Lab Results Component Value Date/Time Iron 22 (L) 05/16/2019 02:30 PM  
 TIBC 175 (L) 05/16/2019 02:30 PM  
 Iron % saturation 13 (L) 05/16/2019 02:30 PM  
 Ferritin 213 05/16/2019 02:30 PM  
 
 
No results found for: SR, CRP, ELLE, ANAIGG, RA, RPR, RPRT, VDRLT, VDRLS, TSH, TSHEXT, TSHEXT No results found for: PH, PHI, PCO2, PCO2I, PO2, PO2I, HCO3, HCO3I, FIO2, FIO2I No results found for: CPK, RCK1, RCK2, RCK3, RCK4, CKNDX, CKND1, TROPT, TROIQ, BNPP, BNP Lab Results Component Value Date/Time  Culture result: NO GROWTH 2 DAYS 11/25/2019 06:06 PM  
 Culture result: GRAM NEGATIVE RODS (A) 11/25/2019 05:25 PM  
 Culture result: NO GROWTH 6 DAYS 05/12/2019 09:29 AM  
 
 
No results found for: TOXA1, RPR, HBCM, HBSAG, HAAB, HCAB1, HAAT, G6PD, CRYAC, HIVGT, HIVR, HIV1, HIV12, HIVPC, HIVRPI No results found for: VANCT, CPK Lab Results Component Value Date/Time Color YELLOW 11/25/2019 05:25 PM  
 Appearance CLEAR 11/25/2019 05:25 PM  
 Specific gravity 1.005 11/25/2019 05:25 PM  
 pH (UA) 5.0 11/25/2019 05:25 PM  
 Protein NEGATIVE  11/25/2019 05:25 PM  
 Glucose NEGATIVE  11/25/2019 05:25 PM  
 Ketone 15 (A) 11/25/2019 05:25 PM  
 Bilirubin NEGATIVE  11/25/2019 05:25 PM  
 Blood SMALL (A) 11/25/2019 05:25 PM  
 Urobilinogen 1.0 11/25/2019 05:25 PM  
 Nitrites POSITIVE (A) 11/25/2019 05:25 PM  
 Leukocyte Esterase TRACE (A) 11/25/2019 05:25 PM  
 WBC 10-20 11/25/2019 05:25 PM  
 RBC 0-5 11/25/2019 05:25 PM  
 Bacteria NEGATIVE  11/25/2019 05:25 PM  
 
 
IMPRESSION · Abnormal CT, pulmonary mass · UTI · Lurede Dandy PLAN 
· Maintain oxygen saturations > 90%; currently on 2L NC 
· Deep suction PRN 
· US guided thora and/or biopsy to be done by IR today · Management of UTI as per primary service · Cefepime/Flagyl/Vanc · DVT prophylaxis: Sam Hussein NP

## 2019-11-27 NOTE — PROGRESS NOTES
Sepsis Bundle Patient Chart Reviewed Plan 1. Home with family assistance. 2. Resume home health through [employer]. 3. Follow up with NP.  
4. CM will continue to follow and assist with discharge planning. 5. Patient will need ambulance transport - ramp.   
DALI Mesa

## 2019-11-27 NOTE — PROGRESS NOTES
1930: Bedside and Verbal shift change report given to Rubi Gastelum RN (oncoming nurse) by [employer], RN (offgoing nurse). Report included the following information SBAR, Kardex, MAR, and Accordion. 0730: Bedside and Verbal shift change report given to BRITTANEY Chou RN (oncoming nurse) by Rubi Gastelum RN (offgoing nurse). Report included the following information SBAR, Kardex, MAR and Accordion. Problem: Falls - Risk of 
Goal: *Absence of Falls Description Document [facility] Fall Risk and appropriate interventions in the flowsheet. Outcome: Progressing Towards Goal 
Note: Fall Risk Interventions: 
  
 
Mentation Interventions: Adequate sleep, hydration, pain control, Bed/chair exit alarm, Door open when patient unattended, Evaluate medications/consider consulting pharmacy, Eyeglasses and hearing aids, Familiar objects from home, Family/sitter at bedside, Gait belt with transfers/ambulation, Increase mobility, More frequent rounding, Reorient patient, Room close to nurse's station, Toileting rounds, Update white board Medication Interventions: Bed/chair exit alarm Elimination Interventions: Call light in reach, Bed/chair exit alarm

## 2019-11-27 NOTE — PROGRESS NOTES
VAt Note - Following chart for vascular access needs. Pt still with 20 GA 2.25\" PIV. Call Ext [phone] for questions or concerns.

## 2019-11-27 NOTE — PROGRESS NOTES
Kin Valles [facility] [address] 
[address], [place], [address] [facility] 
[phone] Medical Progress Note NAME: Luzma Tenorio :  1950 MRM:  [id number] Date/Time: 2019 Assessment / Plan:  
 
  Sepsis: 2/2 PNA and/or UTI. Pleural-based consolidation or mass on the left. Discussed with radiologist, who thought loculated left pleural effusion. Underwent thoracentesis today, follow up on fluid analysis, fluid protein pending. Follow up on cytology. Continue vanc, cefepime/Flagyl. Appreciate pulmonology evaluation. Planning to repeat CT chest to evaluate response to abx. If no better, may need to transfer to [facility] for thoracic surgery / VATS? UTI (urinary tract infection): reviewed cultures,?contaminant. Repeat UA/culture. Continue IV cefepime Quadriplegia / S/P  shunt / Flexion contractures / Dysphagia: well known to me from admission in May, having failed MBS repeatedly so PEG was placed then. I question if CT findings are result of severe aspiration PNA/empyema. He is not appropriate at this junction to repeat MBS as requested by family. Continue baclofen, bisacodyl, oxybutynin. Continue tube feeds Total time spent: 35 minutes, d/w PCCM Time spent in the care of this patient including reviewing records, discussing with nursing and/or other providers on the treatment team, obtaining history and examining the patient, and discussing treatment plans. Care Plan discussed with: Patient, Nursing Staff and >50% of time spent in counseling and coordination of care Discussed:  Care Plan and D/C Planning Prophylaxis:  Lovenox Disposition:  Home w/Family Subjective: Chief Complaint:  Follow up lung mass/effusion Chart/notes/labs/studies reviewed, patient examined at bedside. More interactive, mouthing a few words. No fevers Objective:  
 
 
Vitals: Last 24hrs VS reviewed since prior progress note. Most recent are: 
 
Visit Vitals /55 (BP 1 Location: Left arm, BP Patient Position: At rest) Pulse 92 Temp 98 °F (36.7 °C) Resp 18 Ht 6' (1.829 m) Wt 82 kg (180 lb 12.8 oz) SpO2 95% BMI 24.52 kg/m² SpO2 Readings from Last 6 Encounters:  
11/27/19 95% 11/27/19 96% 11/25/19 95% 11/08/19 98% 10/31/19 98% 10/24/19 98% Intake/Output Summary (Last 24 hours) at 11/27/2019 1409 Last data filed at 11/27/2019 1007 Gross per 24 hour Intake 2170 ml Output 950 ml Net 1220 ml Exam:  
 
Physical Exam: 
 
Gen: Elderly, frail, chronically ill-appearing. NAD HEENT:  Pink conjunctivae, hearing intact to voice Neck:  Supple, without masses Resp:  No accessory muscle use, increased WOB. Diminished L basilar breath sounds, few rales/rhonchi 
Card:  No murmurs, normal S1, S2 without thrills, bruits or peripheral edema Abd:  Soft, non-tender, non-distended, reduced bowel sounds are present, no mass. PEG in place. Musc:  No cyanosis or clubbing Skin:  No rashes or ulcers, skin turgor is good Neuro:  Cranial nerves are grossly intact, quadriplegia, follows commands poorly. Contractures. Minimally verbal 
Psych:  Poor insight, oriented to person Medications Reviewed: (see below) Lab Data Reviewed: (see below) 
 
______________________________________________________________________ Medications:  
 
Current Facility-Administered Medications Medication Dose Route Frequency  potassium phosphate 30 mmol in 0.9% sodium chloride 250 mL infusion   IntraVENous ONCE  
 cefepime (MAXIPIME) 2 g in 0.9% sodium chloride (MBP/ADV) 100 mL  2 g IntraVENous Q8H  
 vancomycin (VANCOCIN) 1,000 mg in 0.9% sodium chloride (MBP/ADV) 250 mL  1,000 mg IntraVENous Q12H  
 sodium chloride (NS) flush 5-10 mL  5-10 mL IntraVENous PRN  
 sodium chloride (NS) flush 5-40 mL  5-40 mL IntraVENous Q8H  
  sodium chloride (NS) flush 5-40 mL  5-40 mL IntraVENous PRN  
 acetaminophen (TYLENOL) tablet 650 mg  650 mg Oral Q4H PRN  
 ondansetron (ZOFRAN) injection 4 mg  4 mg IntraVENous Q4H PRN  
 enoxaparin (LOVENOX) injection 40 mg  40 mg SubCUTAneous Q24H  
 0.9% sodium chloride infusion  75 mL/hr IntraVENous CONTINUOUS  
 metroNIDAZOLE (FLAGYL) IVPB premix 500 mg  500 mg IntraVENous Q12H  
 bisacodyl (DULCOLAX) suppository 10 mg  10 mg Rectal EVERY TU & FRI  
 baclofen (LIORESAL) tablet 10 mg  10 mg Per G Tube TID  oxybutynin (DITROPAN) tablet 5 mg  5 mg Per G Tube [facility] Lab Review:  
 
Recent Labs  
  11/27/19 0222 11/26/19 0016 11/25/19 
1348 WBC 14.1* 21.7* 23.5* HGB 10.2* 12.0* 13.8 HCT 32.7* 38.9 43.6  241 335 Recent Labs  
  11/27/19 0222 11/26/19 0016 11/25/19 
1348  139 134* K 3.6 4.8 4.9 * 109* 103 CO2 23 22 22 * 134* 137* BUN 16 16 18 CREA 0.52* 0.60* 0.74 CA 7.7* 7.9* 9.4 MG 1.9  --   --   
PHOS 1.3*  --   --   
ALB 2.2*  --  3.4* SGOT 16  --  19 ALT 20  --  25 INR 1.3*  --   -- No components found for: Enrique Point No results for input(s): PH, PCO2, PO2, HCO3, FIO2 in the last 72 hours. Recent Labs  
  11/27/19 0222 INR 1.3* Lab Results Component Value Date/Time Specimen Description: URINE 05/25/2009 05:30 PM  
 
Lab Results Component Value Date/Time Culture result: NO GROWTH 2 DAYS 11/25/2019 06:06 PM  
 Culture result:  11/25/2019 05:25 PM  
  >2 ORGANISMS - CONTAMINATED SPECIMEN. SUGGEST RECOLLECTION CONSISTING OF ATLEAST:  
 Culture result: (A) 11/25/2019 05:25 PM  
  MIXED PSEUDOMONAS SPECIES (ATLEAST 2 DIFFERENT COLONY TYPES/STRAINS) ,  
 Culture result: (A) 11/25/2019 05:25 PM  
  ADDITIONAL GRAM NEGATIVE RODS (2 DIFFERENT COLONY TYPE/STRAINS)  Culture result: AND 11/25/2019 05:25 PM  
 Culture result: PROBABLE ENTEROCOCCUS SPECIES (A) 11/25/2019 05:25 PM  
 
        
 ___________________________________________________ Attending Physician: Alka Berg MD

## 2019-11-27 NOTE — PROGRESS NOTES
Discussed with wife yesterday evening. She wishes to proceed with CT-guided biopsy. ADDENDUM: 
Discussed with radiologist Dr. Ayaka Rodarte, who will attempt US-guided thoracentesis.

## 2019-11-28 NOTE — PROGRESS NOTES
0320 TRANSFER - IN REPORT: 
 
Verbal report received from [address] on Leala Rounds  being received from [facility] for change in patient condition(tachycardia),hypoxia. Report consisted of patients Situation, Background, Assessment and  
Recommendations(SBAR). Information from the following report(s) SBAR, Kardex, ED Summary, Intake/Output, MAR, Accordion, Recent Results, Med Rec Status and Cardiac Rhythm ST was reviewed with the receiving nurse. Opportunity for questions and clarification was provided. Assessment completed upon patients arrival to unit and care assumed. Patient placed on Hi leonard 100% , 30 L NC O2. Primary Nurse Vivian Rollins RN and Eloisa Atkinson RN performed a dual skin assessment on this patient Impairment noted- see wound doc flow sheet Dennis score is 11. 
0500 Wife at bedside . 0710 Bedside shift change report given to [facility] . Report included the following information SBAR, Kardex, ED Summary, Intake/Output, MAR, Accordion, Recent Results, Med Rec Status and Cardiac Rhythm ST.

## 2019-11-28 NOTE — PROGRESS NOTES
Patient's ABG results is now available showing: 
 
Results for Addis Land (MRN [id number]) as of 11/28/2019 03:22 Ref. Range 5/25/2009 17:09 11/28/2019 02:45  
pH Latest Ref Range: 7.35 - 7.45   7.50 (H) 7.22 (LL)  
PCO2 Latest Ref Range: 35.0 - 45.0 mmHg 32 (L) 58 (H)  
PO2 Latest Ref Range: 80 - 100 mmHg 78 (L) 77 (L) BICARBONATE Latest Ref Range: 22 - 26 mmol/L 24 23 O2 SAT Latest Ref Range: 92 - 97 % 96 93 BASE DEFICIT Latest Units: mmol/L  5.4 BASE EXCESS Latest Ref Range: BE NORMAL RANGE -3 T mmol/L 1.4 (A) Sample source Latest Units:   ARTERIAL ARTERIAL  
SITE Latest Units:   RR RIGHT RADIAL TOOTIE'S TEST Latest Units:    N/A  
MODE Latest Units:    OTHER  
FIO2 Latest Units: %  100 O2 FLOW RATE Latest Units: L/min  15.00  
O2 METHOD Latest Units:   RA NASAL O2 Critical value read back Unknown  MD Samina  
 
 
Chest xray portable showed: 
IMPRESSION: Increase complete opacification of the left hemithorax likely due to 
a combination of pleural effusion and airspace opacity. Clear right lung.  
 
- patient has been transferred now to the ICU for higher level of care. I have spoken with pulmonologist/ intensivist on call- Dr. Hermes Arvizu who recommends high flow oxygen for support. Patient's clinical status is critical and the concern is for possible clinical deterioration with poor prognosis. I have called and spoken with patient's wife - Samy Sanchez informing her of this new change in patient's clinical status. Continue with supportive cares. Code status remains DNR.
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
## 2019-11-28 NOTE — ROUTINE PROCESS
Bedside and Verbal shift change report given to Bong Paredes (oncoming nurse) by Bria GAYLE (offgoing nurse). Report included the following information SBAR, Kardex, Intake/Output, Recent Results and Cardiac Rhythm NSR.

## 2019-11-28 NOTE — PROGRESS NOTES
Kin [facility]es [place] [address] 
[address] [facility], [address] [place] [address] 
[phone] Medical Progress Note NAME: Claudetta Lurie :  1950 MRM:  [id number] Date/Time: 2019 Assessment / Plan:  
 
  Acute hypoxic respiratory failure: due to recurrent aspiration. Severe PNA. CXR reviewed, showing increased complete opacification of the left hemithorax likely due to a combination of pleural effusion and airspace opacity. Transferred to ICU overnight and placed on hi-flow. Worsening this morning, and O2 sats cannot recover despite bag valve mask. Discussed with family who wish to pursue comfort care. DNR/DNI. Will keep on hi-leonard until [place]-town family gets a chance to say goodbye. IV morphine and Ativan for comfort/air hunger/anxiety. Scopolamine patch for secretions. Life expectancy is hours. Sepsis: 2/2 severe aspiration PNA as above. On broad-spectrum abx vanc, cefepime/Flagyl which we will stop as we move towards comfort care UTI (urinary tract infection): was on abx which we will stop Quadriplegia / S/P  shunt / Flexion contractures / Dysphagia: well known to me from admission in May, having failed MBS repeatedly so PEG was placed then. Wife and family have done a tremendous job caring for patient for the past 30-40 years and I completely support their decision in goals of care in transitioning to comfort care Total time spent: 35 minutes, d/w Dr. Long Mcdaniel (nocturnist) and PCCM Dr. Quinn Orn Time spent in the care of this patient including reviewing records, discussing with nursing and/or other providers on the treatment team, obtaining history and examining the patient, and discussing treatment plans. Care Plan discussed with: Patient, Nursing Staff and >50% of time spent in counseling and coordination of care Discussed:  Care Plan and D/C Planning Prophylaxis:  Lovenox Disposition:  Home w/Family Subjective: Chief Complaint:  Follow up lung mass/effusion Chart/notes/labs/studies reviewed, patient examined at bedside. Severe respiratory distress overnight. Febrile this morning. O2 sat dropped to 60s Objective:  
 
 
Vitals:  
 
  
Last 24hrs VS reviewed since prior progress note. Most recent are: 
 
Visit Vitals /46 (BP 1 Location: Left arm, BP Patient Position: At rest) Pulse (!) 115 Temp (!) 101.2 °F (38.4 °C) Resp 20 Ht 6' (1.829 m) Wt 91.9 kg (202 lb 8 oz) SpO2 (!) 80% BMI 27.46 kg/m² SpO2 Readings from Last 6 Encounters:  
11/28/19 (!) 80% 11/27/19 96% 11/25/19 95% 11/08/19 98% 10/31/19 98% 10/24/19 98% O2 Flow Rate (L/min): 60 l/min Intake/Output Summary (Last 24 hours) at 11/28/2019 5500 Last data filed at 11/28/2019 0700 Gross per 24 hour Intake 2530 ml Output 1875 ml Net 655 ml Exam:  
 
Physical Exam: 
 
Gen: Elderly, frail, ill-appearing. HEENT:  Pink conjunctivae, hearing intact to voice Neck:  Supple, without masses Resp: Accessory muscle use, increased WOB. Coarse/severely diminished breath sounds L-sided Card: Tachycardic, distant, no appreciable murmurs, thrills, bruits or peripheral edema Abd:  Soft, non-tender, non-distended, reduced bowel sounds are present, no mass. PEG in place. Musc:  No cyanosis or clubbing Skin:  No rashes or ulcers Neuro:  unresponsive, quadriplegia, does not follow commands poorly. Psych: no insight, unresponsive Medications Reviewed: (see below) Lab Data Reviewed: (see below) 
 
______________________________________________________________________ Medications:  
 
Current Facility-Administered Medications Medication Dose Route Frequency  [START ON 11/29/2019] Vancomycin Trough: draw at 0430 on 11/29 (draw 30 min prior to 0500 dose on 11/29). Thank you!    Other ONCE  
 morphine injection 2 mg  2 mg IntraVENous Q1H PRN  
  LORazepam (ATIVAN) injection 1 mg  1 mg IntraVENous Q2H PRN  
 scopolamine (TRANSDERM-SCOP) 1 mg over 3 days 1 Patch  1 Patch TransDERmal Q72H  cefepime (MAXIPIME) 2 g in 0.9% sodium chloride (MBP/ADV) 100 mL  2 g IntraVENous Q8H  
 vancomycin (VANCOCIN) 1,000 mg in 0.9% sodium chloride (MBP/ADV) 250 mL  1,000 mg IntraVENous Q12H  
 sodium chloride (NS) flush 5-10 mL  5-10 mL IntraVENous PRN  
 sodium chloride (NS) flush 5-40 mL  5-40 mL IntraVENous Q8H  
 sodium chloride (NS) flush 5-40 mL  5-40 mL IntraVENous PRN  
 acetaminophen (TYLENOL) tablet 650 mg  650 mg Oral Q4H PRN  
 ondansetron (ZOFRAN) injection 4 mg  4 mg IntraVENous Q4H PRN  
 enoxaparin (LOVENOX) injection 40 mg  40 mg SubCUTAneous Q24H  
 0.9% sodium chloride infusion  75 mL/hr IntraVENous CONTINUOUS  
 metroNIDAZOLE (FLAGYL) IVPB premix 500 mg  500 mg IntraVENous Q12H  
 bisacodyl (DULCOLAX) suppository 10 mg  10 mg Rectal EVERY TU & FRI  
 baclofen (LIORESAL) tablet 10 mg  10 mg Per G Tube TID  oxybutynin (DITROPAN) tablet 5 mg  5 mg Per G Tube [facility] Lab Review:  
 
Recent Labs  
  11/27/19 0222 11/26/19 0016 11/25/19 
1348 WBC 14.1* 21.7* 23.5* HGB 10.2* 12.0* 13.8 HCT 32.7* 38.9 43.6  241 335 Recent Labs  
  11/27/19 0222 11/26/19 0016 11/25/19 
1348  139 134* K 3.6 4.8 4.9 * 109* 103 CO2 23 22 22 * 134* 137* BUN 16 16 18 CREA 0.52* 0.60* 0.74 CA 7.7* 7.9* 9.4 MG 1.9  --   --   
PHOS 1.3*  --   --   
ALB 2.2*  --  3.4* SGOT 16  --  19 ALT 20  --  25 INR 1.3*  --   -- No components found for: Enrique Point Recent Labs  
  11/28/19 
0245 PH 7.22* PCO2 58* PO2 77* HCO3 23 FIO2 100 Recent Labs  
  11/27/19 
0222 INR 1.3* Lab Results Component Value Date/Time Specimen Description: URINE 05/25/2009 05:30 PM  
 
Lab Results Component Value Date/Time  Culture result: PENDING 11/27/2019 12:24 PM  
 Culture result: NO GROWTH 3 DAYS 11/25/2019 06:06 PM  
 Culture result:  11/25/2019 05:25 PM  
  >2 ORGANISMS - CONTAMINATED SPECIMEN. SUGGEST RECOLLECTION CONSISTING OF ATLEAST:  
 Culture result: (A) 11/25/2019 05:25 PM  
  MIXED PSEUDOMONAS SPECIES (ATLEAST 2 DIFFERENT COLONY TYPES/STRAINS) ,  
 Culture result: (A) 11/25/2019 05:25 PM  
  ADDITIONAL GRAM NEGATIVE RODS (2 DIFFERENT COLONY TYPE/STRAINS) Culture result: AND 11/25/2019 05:25 PM  
 Culture result: PROBABLE ENTEROCOCCUS SPECIES (A) 11/25/2019 05:25 PM  
 
        
___________________________________________________ Attending Physician: Sarah Moran MD

## 2019-11-28 NOTE — PROGRESS NOTES
requested, patient passed, family at bedside. Provided spiritual presence and listening as the patient's wife, Lamar Lawton, and daughter, Merari Luna, processed their grief and spoke of their loved one. Said that Eric Silver' brother was able to visit earlier today. Told stories from Luisito's past and spoke about their assurance of Luisito's future finding comfort in knowing he is no longer suffering and is more than likely running and jumping in heaven with family and friends who have passed before him. They expressed gratitude to the staff for all of the care they have received. When asked how they can best be served, they stated they would simply like time to be together saying they had everything they needed. They appeared comforted and encouraged as a result of this visit and the care received and expressed gratitude. Visited by Rev. John Aguero MDiv, [facility], [address]  paging service: [phone])

## 2019-11-28 NOTE — PROGRESS NOTES
0700- Bedside and Verbal shift change report given to [facility] (oncoming nurse) by Cleo Duran (offgoing nurse). Report included the following information SBAR, Kardex, Intake/Output, MAR and Recent Results. Primary Nurse Calvin Mccarthy and Elsie Hopkins, NALINI performed a dual skin assessment on this patient Impairment noted- see wound doc flow sheet Dennis score is see flow sheet. 4345- Call placed to Lab regarding collected labs with no result. 0730- Assessment completed, see flow sheet. Patient is lethargic, eyes open to voice. No command following. Decreased attention and concentration. Able to track with eyes. Pupils equal and reactive to light. Heart rate regular, loud, Sinus tach on monitor with HR ranging from 100s- 110s. Lungs coarse upper on L and diminished lower, R coarse, expiratory wheezing. Suctioned, scant amount of thick yellow sputum suctioned. On hi flow NC 30 L at 100% FiO2. Bowel sounds active. PEG tube present. Gastric residual 2 mL. Arias in place and draining, arias care performed. Wound present to sacrum, covered with zinc cream. R knee with abrasion and foam dressing present and intact. 1+ edema present in BUE and 2 + pitting present in BLE. Will continue to monitor. 0800- Medications given per MAR. Patient family present at bedside, both wife and daughter agree that patient would not want intubation if he continues to decline. WIll update Dr. Jose Francisco Rodrigues. 0830- Patient suctioned, moderate amount of thick yellow sputum suctioned nasotracheally. 5601- This RN in patient room, patient bathed, linen changed. 0900- Patient on 30 L 100% FIO2 Hi Flow NC. Patient spo2 decreasing to 85%. Dr. Jose Francisco Rodrigues called, RT called. Patient ventilated with ambu bag on 10 L. O2 decreasing to 70% with ventilation. Patient is unresponsive, no withdraw to sternal rub. With guppy like breathing. 1057- Dr. Jose Francisco Rodrigues and Dr. Diego Marcos present at bedside. Family present at bedside. 4738- Patient family has decided to continue with comfort care. Will continue to keep patent on Hi Flow NC at 60L on 100 % FiO2 per Dr. Cheyenne Fuentes until other family members arrived. 0930- Medications given per Lavern Pena RN.  
 
[id number] [facility] present at bedside. 1000- Patient family present at bedside with [facility]. [phone]- Patient with increased work of breathing/guppy like breathing, PRN morphine administered to patient, see MAR. Will continue to monitor. 1141- Patient continues with guppy like breathing, prn ativan administered. Will continue to monitor closely. 1200- Patient Wife stated family should all arrive by 1pm, then can transition off hi flow nasal cannula per Dr. Cheyenne Fuentes. 1240- Patient with increased work of breathing, patient wife request of additional dose of morphine. See MAR. 
 
5589- Dr. Farrah Augustin updated on patient continued labored breathing and increasing needs for morphine and ativan, orders to increase frequency for q 15 mins prn for ativan and morphine. 1407- Premedicated patient with morphine and ativan prior to transitioning patient to nasal cannula. 1430- Patient brother present at bedside, stated would like to speak with MD. Dr. Cheyenne Fuentes updated, stated she would speak to family. 1435- Patient placed on 2 L NC, hi flow NC removed. Will continue to monitor closely. 1442- Patient with gasping breathing and increased use of accessory muscles. Patient brother requesting additional dose of morphine. Will administer PRN medications, see MAR.  
 
1450- Patient with agenol breathing. No pulses palpable. Dr. Cheyenne Fuentes in room assessing patient. Arnaldo Al Supervisor, Christine Key, called and updated. Bernadette called and updated. 1459- Call placed to [address] [address] present at bedside. 1600- Post mortuum care performed. [address] [place] paged. 1610- Patient transported off of unit.

## 2019-11-28 NOTE — PROGRESS NOTES
Clinical Shift Summary: 
 
1900: Bedside and Verbal shift change report given to Jayleen Manning RN (oncoming nurse) by Nurys Schreiber RN (offgoing nurse). Report included the following information SBAR, Kardex, ED Summary, Intake/Output, MAR, Accordion, Recent Results and Cardiac Rhythm NSR.  
  
2030: Patient presented a congested cough. Notified RT. RT provided deep nasoropharyngeal suctioning. White sputum was produced. Patient's breathing resolved. Patient began to rest.  
 
[phone]: Patient's ultrasound IV infiltrated. PICC line order is present, yet was no expedited. Was unable to find further assistance with access r/t acute changes(see 0200 for more information) 0030: Administered feeding tube at the set rate. Tube feed was checked for ph. Volume was < 3 mL. Prior to administering tube feed, administered scheduled medication. Flushed tube feed. Began scheduled tube feed. 0200: Rounded on patient. Patient appeared in respiratory distress. Deep suctioned patient. Brownish - thick sputum was expectorated. O2 Saturations decreased to low-mid 80s from baseline at mid 90s w/ 3 L/Min O2. Patient's breath smelled of emesis. Requested RT for additional assistance. RT believed to be emesis. Increased O2 to 6 L/Min prior to RT. RT changed to mid-flow O2. Primary RN remained at the bedside. O2 was increased to 15 L/Min. O2 saturations increased to low 90s with HR in the 120s-130s. Notified Charge RN. Charge RN came to the bedside. Charge RN changed to high - flow O2 r/t O2 saturations in the low 80s. Notified MD. MD came to the bedside for assessment. MD requested PEG Tube suction and reviewed charting. MD ordered ABGs and Chest X-Ray w/ KUB. Called pulmonology for input on hospitalist behalf and transferred care to ICU. Gave RN bedside report on patient.    
 
TRANSFER - OUT REPORT: 
 
Verbal report given to Estefani Kent RN(name) on Eveleen Cheadle being transferred to ICU(unit) for change in patient condition(Repiratory Acidosis w/ mucous plug) Report consisted of patients Situation, Background, Assessment and  
Recommendations(SBAR). Information from the following report(s) SBAR, Kardex, ED Summary, Intake/Output, MAR, Accordion, Recent Results and Cardiac Rhythm Sinus Tachy was reviewed with the receiving nurse. Lines:  
Peripheral IV 11/25/19 Right; Lower Basilic (Active) Site Assessment Clean, dry, & intact 11/27/2019  3:40 PM  
Phlebitis Assessment 0 11/27/2019  3:40 PM  
Infiltration Assessment 0 11/27/2019  3:40 PM  
Dressing Status Clean, dry, & intact 11/27/2019  3:40 PM  
Dressing Type Transparent 11/27/2019  3:40 PM  
Hub Color/Line Status Pink; Infusing 11/27/2019  3:40 PM  
Action Taken Open ports on tubing capped 11/27/2019  3:40 PM  
Alcohol Cap Used Yes 11/27/2019  3:40 PM  
  
 
Opportunity for questions and clarification was provided. Patient transported with: 
 Monitor O2 @ 15 liters Registered Nurse

## 2019-11-28 NOTE — PROGRESS NOTES
Pt seen and evaluated. Nurse reported that patient was acute dyspneic and hypoxic with O2sats in the 80s due to possible aspiration. Tube feeds were held. Patient remained SOB and hypoxic after deep suctioning. Patient was placed on 6 liters O2 via NC per respiratory therapist and nurse. On arrival at the bedside, patient is dysarthric; unable to answer questions. PE: 
GEN- ill appearing male in acute respiratory distress with audible coarse breath sounds PSYCH- awake but non-verbal 
NEURO-GCS 10  (E4 V2  M4). quadriplegia HEENT-NCAT/pupils 2 mm reactive bilateral. Oropharynx clear. NECK-supple, trachea midline LUNGS- coarse B rales HEART-RRR ABD- distended, protuberant. NT. Hypoactive BS. No R/G/R 
VASCULAR-2+ radial/1+DP pulses bilateral.  1+ pitting edema SKIN-warm/dry MS-no calf tenderness A/P: 
Acute hypoxic respiratory failure. Possible aspiration vs mucous plug vs progression of pleural effusion/ pneumonia. Patient is DNR as such no ETT intubation. BIPAP is contraindicated due to likely aspiration. Currently remains on O2 via nasal cannula. Previous chest xray yesterday had showed moderately large, likely loculated left pleural effusion. Increased airspace disease in the left lung, likely pneumonia and/or atelectasis. ABG and chest xray portable ordered stat. Continue IV antibiotics with suspected aspiration. Abdominal distention. Tube feeds were discontinued. Place gastric tube to suction for decompression. Ordered stat KUB to rule out bowel obstruction.

## 2019-11-28 NOTE — PROGRESS NOTES
Accessed chart to assist primary care nurse Antwan Walton RN to administer medication for comfort care.

## 2019-11-28 NOTE — ACP (ADVANCE CARE PLANNING)
Advance Care Planning (ACP) Provider Note - Comprehensive Date of ACP Conversation:  11/28/19 Persons included in Conversation:  patient's wife and daughter Length of ACP Conversation in minutes:  16 minutes Authorized Decision Maker (if patient is incapable of making informed decisions): This person is: pt's wife Gabbymichael Grimaldo General ACP for ALL Patients with Decision Making Capacity: 
Importance of advance care planning, including choosing a healthcare agent to communicate patient's healthcare decisions if patient lost the ability to make decisions. Review of Existing Advance Directive: 
Patient and family do have a current advance directive (DNR/DNI) Active Diagnoses:  
Patient Active Problem List  
Diagnosis Code  Quadriplegia ([address] G82.50  Flexion contractures M24.50  Sacral wound S31.000A  Hepatitis C virus infection cured after antiviral drug therapy Z86.19  
 Hydronephrosis N13.30  
 S/P  shunt Z98.2  
 UTI (urinary tract infection) N39.0  DNR (do not resuscitate) [id number]  Hematuria R31.9  Anemia D64.9  Dysphagia R13.10  Hypokalemia E87.6  Goals of care, counseling/discussion Z71.89  
 ACP (advance care planning) Z71.89  
 PEG (percutaneous endoscopic gastrostomy) status ([address] Z93.1  Decubitus ulcer of sacral region, stage 2 ([address] B76.797  Gastroesophageal reflux disease without esophagitis K21.9  
 OAB (overactive bladder) N32.81  
 Aspiration into respiratory tract T17.908A  Pressure injury, stage 2, with suspected deep tissue injury ([address] L89.92  
 Sepsis ([address] A41.9 These active diagnoses are of sufficient risk that focused discussion on advance care planning is indicated in order to allow the patient to thoughtfully consider personal goals of care; and, if situations arise that prevent the ability to personally give input, to ensure appropriate representation of their personal desires for different levels and aggressiveness of care. For Serious or Chronic Illness: 
Understanding of medical condition Reviewed pt's clinical status. Marcelle Boeck has multiple medical problems including quadriplegia, chronic dysphagia with PEG, recurrent aspiration and is being admitted for severe sepsis due to aspiration pneumonia. We reviewed overnight events and again acute decompensation this morning, with patient's oxygen saturation failing to recover despite bag valve mask. Family confirm DNR/DNI status and wanted to make sure that we do not intubate the patient. Patient's wife and step-daughter, made it very clear to me that they do not want him to suffer and wish to proceed with comfort care. Family will try to get in-town family and friends to come in to say good bye, as I anticipate life expectancy to be hours. Will keep on hi-flow right now. Interventions Provided: Added IV morphine, Ativan, scopolamine

## 2019-11-28 NOTE — PROGRESS NOTES
Notified that the patient was apneic without palpable pulse. On exam no spontaneous respirations, absent heart beat, pulse not palpable and did not respond to stimuli. Time of death [phone].

## 2019-11-28 NOTE — PROGRESS NOTES
Name: [facility]: [address]  
: 1950 Admit Date: 2019 Phone: [phone]  Room: [facility]/01 PCP: Georgi Gao NP  MRN: [id number] Date: 2019  Code: DNR Chart and notes reviewed. Data reviewed. I review the patient's current medications in the medical record at each encounter. I have evaluated and examined the patient. History of Present Illness: Mr. Kirsten Thomas is a 82 yo woman with a history of quadriplegia, brainstem disorder, CSF shunt, chronic arias, and chronic tube feeds who presented with abdominal pain and is admitted with a UTI and found to have a pulmonary mass on CT. He is unable to provide history and I attempted to call his wife for additional information, but she did not answer. Appears comfortable without observed dyspnea, no abdominal pain on palpation. Febrile to 100.7 overnight. ROS: 
Feeling ok. Denies SOB, wheezing, or pain. Does have cough with mucous he has difficulty expectorating. Suctioned him in room this morning and able to remove large plug. He felt much better following suction. Overnight Events: 
Underwent aspiration of loculated effusion yesterday. Overnight transferred to the ICU with worsening hypoxia. BiPAP unable to be placed due to aspiration risk and patient is DNR/DNI, placed on HF. He acutely decompensated this morning and required bagging, but sats unable to be raised about 80% and with agonal breathing. Dr. Graciela Truong spoke with the family while I was at bedside and they have elected to make him comfort care at this point, he has placed orders for ativan/morphine for symptomatic purposes. Past Medical History:  
Diagnosis Date  Disorder of brainstem Bruised brainstem  Double vision  Frequent UTI  History of vascular access device 2019 [facility] 4Fr Midline 54BJ Right Basilic for poor access  Kidney stones  Quadriplegia ([address] Past Surgical History: Procedure Laterality Date  HX CSF SHUNT  HX CSF SHUNT  HX ORTHOPAEDIC Left  HX OTHER SURGICAL    
 bladder stone No family history on file. Social History Tobacco Use  Smoking status: Former Smoker  Smokeless tobacco: Never Used Substance Use Topics  Alcohol use: No  
 
 
No Known Allergies Current Facility-Administered Medications Medication Dose Route Frequency  morphine injection 2 mg  2 mg IntraVENous Q1H PRN  
 LORazepam (ATIVAN) injection 1 mg  1 mg IntraVENous Q2H PRN  
 scopolamine (TRANSDERM-SCOP) 1 mg over 3 days 1 Patch  1 Patch TransDERmal Q72H  sodium chloride (NS) flush 5-10 mL  5-10 mL IntraVENous PRN  
 sodium chloride (NS) flush 5-40 mL  5-40 mL IntraVENous Q8H  
 sodium chloride (NS) flush 5-40 mL  5-40 mL IntraVENous PRN  
 acetaminophen (TYLENOL) tablet 650 mg  650 mg Oral Q4H PRN  
 ondansetron (ZOFRAN) injection 4 mg  4 mg IntraVENous Q4H PRN  
 0.9% sodium chloride infusion  75 mL/hr IntraVENous CONTINUOUS  
 
 
 
REVIEW OF SYSTEMS  
12 point ROS negative except as stated in the HPI. Physical Exam:  
Visit Vitals /46 (BP 1 Location: Left arm, BP Patient Position: At rest) Pulse (!) 115 Temp (!) 101.2 °F (38.4 °C) Resp 20 Ht 6' (1.829 m) Wt 91.9 kg (202 lb 8 oz) SpO2 (!) 80% BMI 27.46 kg/m² General:  Alert, cooperative, no distress Head:  Normocephalic Eyes:  Conjunctivae/corneas clear. Nose: Nares normal. Septum midline. Mucosa normal.   
Throat: Lips, mucosa, and tongue normal.   
Neck: Supple, symmetrical, trachea midline Lungs:   Clear to auscultation bilaterally. Respirations non-labored. Chest wall:  No tenderness or deformity. Heart:  Regular rate and rhythm, S1, S2 normal, no murmur, click, rub or gallop. Abdomen:   Soft, non-tender. Bowel sounds normal.  
Extremities: Extremities normal, atraumatic, no cyanosis or edema. Pulses: 2+ and symmetric all extremities. Skin: Skin color, texture, turgor normal. No rashes or lesions Lymph nodes: Cervical, supraclavicular nodes normal.  
Neurologic: Alert, no acute focal abnormalities, quadriplegic Lab Results Component Value Date/Time Sodium 142 11/27/2019 02:22 AM  
 Potassium 3.6 11/27/2019 02:22 AM  
 Chloride 111 (H) 11/27/2019 02:22 AM  
 CO2 23 11/27/2019 02:22 AM  
 BUN 16 11/27/2019 02:22 AM  
 Creatinine 0.52 (L) 11/27/2019 02:22 AM  
 Glucose 166 (H) 11/27/2019 02:22 AM  
 Calcium 7.7 (L) 11/27/2019 02:22 AM  
 Magnesium 1.9 11/27/2019 02:22 AM  
 Phosphorus 1.3 (L) 11/27/2019 02:22 AM  
 Lactic acid 1.4 11/26/2019 09:15 AM  
 
 
Lab Results Component Value Date/Time WBC 14.1 (H) 11/27/2019 02:22 AM  
 HGB 10.2 (L) 11/27/2019 02:22 AM  
 PLATELET 322 10/57/1385 02:22 AM  
 MCV 93.4 11/27/2019 02:22 AM  
 
 
Lab Results Component Value Date/Time INR 1.3 (H) 11/27/2019 02:22 AM  
 aPTT 29.9 11/27/2019 02:22 AM  
 AST (SGOT) 16 11/27/2019 02:22 AM  
 Alk. phosphatase 69 11/27/2019 02:22 AM  
 Protein, total 6.6 11/27/2019 02:22 AM  
 Albumin 2.2 (L) 11/27/2019 02:22 AM  
 Globulin 4.4 (H) 11/27/2019 02:22 AM  
 
 
Lab Results Component Value Date/Time Iron 22 (L) 05/16/2019 02:30 PM  
 TIBC 175 (L) 05/16/2019 02:30 PM  
 Iron % saturation 13 (L) 05/16/2019 02:30 PM  
 Ferritin 213 05/16/2019 02:30 PM  
 
 
No results found for: SR, CRP, ELLE, ANAIGG, RA, RPR, RPRT, VDRLT, VDRLS, TSH, TSHEXT, TSHEXT Lab Results Component Value Date/Time PH 7.22 (LL) 11/28/2019 02:45 AM  
 PCO2 58 (H) 11/28/2019 02:45 AM  
 PO2 77 (L) 11/28/2019 02:45 AM  
 HCO3 23 11/28/2019 02:45 AM  
 FIO2 100 11/28/2019 02:45 AM  
 
 
No results found for: CPK, RCK1, RCK2, RCK3, RCK4, CKNDX, CKND1, TROPT, TROIQ, BNPP, BNP Lab Results Component Value Date/Time  Culture result: PENDING 11/27/2019 12:24 PM  
 Culture result: NO GROWTH 3 DAYS 11/25/2019 06:06 PM  
 Culture result:  11/25/2019 05:25 PM  
 >2 ORGANISMS - CONTAMINATED SPECIMEN. SUGGEST RECOLLECTION CONSISTING OF ATLEAST:  
 Culture result: (A) 11/25/2019 05:25 PM  
  MIXED PSEUDOMONAS SPECIES (ATLEAST 2 DIFFERENT COLONY TYPES/STRAINS) ,  
 Culture result: (A) 11/25/2019 05:25 PM  
  ADDITIONAL GRAM NEGATIVE RODS (2 DIFFERENT COLONY TYPE/STRAINS) Culture result: AND 11/25/2019 05:25 PM  
 Culture result: PROBABLE ENTEROCOCCUS SPECIES (A) 11/25/2019 05:25 PM  
 
 
No results found for: TOXA1, RPR, HBCM, HBSAG, HAAB, HCAB1, HAAT, G6PD, CRYAC, HIVGT, HIVR, HIV1, HIV12, HIVPC, HIVRPI No results found for: VANCT, CPK Lab Results Component Value Date/Time Color YELLOW/STRAW 11/27/2019 06:30 PM  
 Appearance CLOUDY (A) 11/27/2019 06:30 PM  
 Specific gravity 1.005 11/25/2019 05:25 PM  
 pH (UA) 6.0 11/27/2019 06:30 PM  
 Protein TRACE (A) 11/27/2019 06:30 PM  
 Glucose NEGATIVE  11/27/2019 06:30 PM  
 Ketone NEGATIVE  11/27/2019 06:30 PM  
 Bilirubin NEGATIVE  11/27/2019 06:30 PM  
 Blood NEGATIVE  11/27/2019 06:30 PM  
 Urobilinogen 1.0 11/27/2019 06:30 PM  
 Nitrites NEGATIVE  11/27/2019 06:30 PM  
 Leukocyte Esterase TRACE (A) 11/27/2019 06:30 PM  
 WBC 10-20 11/27/2019 06:30 PM  
 RBC 0-5 11/27/2019 06:30 PM  
 Bacteria NEGATIVE  11/27/2019 06:30 PM  
 
 
IMPRESSION · Abnormal CT, pulmonary mass · UTI · Artist Brock PLAN 
· Will keep on HF until other family members can arrive · Morphine, ativan prn for air hunger · D/C all meds that are not for palliative purposes · All labs d/c'ed · DNR/DNI Family at bedside and updated; plan discussed with nursing/RT Art Borges MD

## 2019-11-29 ENCOUNTER — TELEPHONE (OUTPATIENT)
Dept: PALLATIVE CARE | Age: 69
End: 2019-11-29

## 2019-11-29 LAB
BACTERIA SPEC CULT: NORMAL
CC UR VC: NORMAL
SERVICE CMNT-IMP: NORMAL

## 2019-11-29 PROCEDURE — 3331090001 HH PPS REVENUE CREDIT

## 2019-11-29 PROCEDURE — 3331090002 HH PPS REVENUE DEBIT

## 2019-11-29 NOTE — DISCHARGE SUMMARY
Physician Discharge Summary Patient ID: 
Isaac Diaz [id number] 
81 y.o. 
1950 Admit date: 11/25/2019 Discharge date: 11/28/2019 Admission Diagnoses: Sepsis ([address] [A41.9] Principal Discharge Diagnoses:   
Aspiration pneumonia OTHER PROBLEMS ADDRESSEDS Principal Diagnosis Sepsis ([address] Principal Problem: 
  Sepsis ([address] (11/25/2019) Active Problems: 
  Quadriplegia ([address] (2/4/2019) Flexion contractures (2/4/2019) S/P  shunt (5/11/2019) UTI (urinary tract infection) (5/11/2019) Dysphagia (5/16/2019) Patient Active Problem List  
Diagnosis Code  Quadriplegia ([address] G82.50  Flexion contractures M24.50  Sacral wound S31.000A  Hepatitis C virus infection cured after antiviral drug therapy Z86.19  
 Hydronephrosis N13.30  
 S/P  shunt Z98.2  
 UTI (urinary tract infection) N39.0  DNR (do not resuscitate) [phone]  Hematuria R31.9  Anemia D64.9  Dysphagia R13.10  Hypokalemia E87.6  Goals of care, counseling/discussion Z71.89  
 ACP (advance care planning) Z71.89  
 PEG (percutaneous endoscopic gastrostomy) status ([address] Z93.1  Decubitus ulcer of sacral region, stage 2 ([address] F72.458  Gastroesophageal reflux disease without esophagitis K21.9  
 OAB (overactive bladder) N32.81  
 Aspiration into respiratory tract T17.908A  Pressure injury, stage 2, with suspected deep tissue injury ([address] L89.92  
 Sepsis ([address] A41.9 Hospital Course: Mr. Pietro Spain is a 82 yo WM w/ hx of quadriplegia, dysphagia s/p PEG, recurrent aspiration presenting with left-sided upper abdominal pain, found to severe L-sided PNA Acute hypoxic respiratory failure: due to recurrent aspiration. Severe PNA. Underwent thoracentesis yesterday. Overnight likely had another aspirating event.  Repeat CXR showed increased complete opacification of the left hemithorax likely due to a combination of pleural effusion and airspace opacity. Transferred to ICU overnight and placed on hi-flow. Worsened rapidly this morning, and O2 sats could not recover despite bag valve mask. Discussed with family who wish to pursue comfort care. DNR/DNI. Kept on hi-leonard until in-town family gets a chance to say goodbye. IV morphine and Ativan for comfort/air hunger/anxiety. Scopolamine patch for secretions. Pt passed away within hours 
  
  Sepsis: 2/2 severe aspiration PNA as above. Was treated with broad-spectrum abx vanc, cefepime/Flagyl  
  
  UTI (urinary tract infection): was on abx as above 
  
  Quadriplegia / S/P  shunt / Flexion contractures / Dysphagia: well known to me from admission in May, having failed MBS repeatedly so PEG was placed then. Wife and family have done a tremendous job caring for patient for the past 30-40 years and I completely support their decision in goals of care in transitioning to comfort care 
  
 
Condition at discharge:  Procedures performed: thoracentesis Imaging studies: CXR  Increase complete pacification of the left hemithorax likely due to a combination of pleural effusion and airspace opacity. Clear right lung. CT chest 
Pleural-based consolidation or mass on the left. Please consider biopsy if clinically appropriate. PCP: Paramjit Drummond NP Consults: Pulmonary/Intensive care Discharge Exam: 
Patient Vitals for the past 24 hrs: 
 Temp Pulse Resp BP SpO2  
19 1400  (!) 108 17 (!) 50/28 (!) 84 % 19 1300  (!) 108 16 (!) 53/36 (!) 79 % 19 1200  (!) 112 17 (!) 68/34 (!) 78 % 19 1100  (!) 110 19 (!) 79/38 (!) 77 % 19 1000  (!) 111 19 (!) 88/39 (!) 76 % 19 0930  (!) 111 16 98/43 (!) 77 % 19 0920     (!) 80 % 19 0900  (!) 115 20    
19 0800  (!) 114 20 119/49 93 % 19 0730 (!) 101.2 °F (38.4 °C) (!) 115 20 127/46 91 % 19 0706  (!) 122     
19 0700  (!) 122 20 148/48 92 % 19 0630  (!) 126 23 141/61 96 % 19 0600  (!) 127 23 153/62 95 % 19 0530  (!) 128 27 169/68 95 % 19 0515  (!) 128 25 161/56 95 % 19 0500  (!) 128 26 148/64 96 % 19 0445  (!) 129 22 136/66 97 % 19 0435     95 % 19 0430  (!) 127 25 138/63 97 % 19 0415  (!) 126 24 137/60 96 % 19 0400  (!) 129 28 137/76 96 % 19 0345  (!) 134 25 129/74 95 % 19 0330 99.8 °F (37.7 °C) (!) 144 23 132/78 94 % 19 0321  (!) 139 20  93 % 19 0318  (!) 138     
19 0220  (!) 135   91 % 19 0215  (!) 130   (!) 83 % 19 0210  (!) 132   93 % 19 0205  (!) 127   91 % 19 0200  (!) 122   (!) 85 % 19 2329 98 °F (36.7 °C) 97 18 133/64 95 % 19 2308  [facility] See exam from today. Declared  at 2:50 pm by Dr. Denise Singletary Disposition:  Patient Instructions:  
Discharge Medication List as of 2019  4:50 PM  
  
 
 
Activity: n/a Diet: n/a Wound Care: n/a Follow-up Information None I spent 35 minutes on this discharge. Signed: 
Madelyn Mendoza MD 
2019 
8:37 PM 
 
CC: PCP Carolina Rico

## 2019-11-30 LAB
BACTERIA SPEC CULT: NORMAL
SERVICE CMNT-IMP: NORMAL

## 2019-11-30 PROCEDURE — 3331090001 HH PPS REVENUE CREDIT

## 2019-11-30 PROCEDURE — 3331090002 HH PPS REVENUE DEBIT

## 2019-12-01 VITALS
OXYGEN SATURATION: 97 % | TEMPERATURE: 98.2 F | DIASTOLIC BLOOD PRESSURE: 68 MMHG | HEART RATE: 66 BPM | SYSTOLIC BLOOD PRESSURE: 110 MMHG | RESPIRATION RATE: 18 BRPM

## 2019-12-01 LAB
BACTERIA SPEC CULT: NORMAL
BACTERIA SPEC CULT: NORMAL
GRAM STN SPEC: NORMAL
GRAM STN SPEC: NORMAL
SERVICE CMNT-IMP: NORMAL

## 2019-12-01 PROCEDURE — 3331090001 HH PPS REVENUE CREDIT

## 2019-12-01 PROCEDURE — 3331090002 HH PPS REVENUE DEBIT

## 2019-12-02 PROCEDURE — 3331090001 HH PPS REVENUE CREDIT

## 2019-12-02 PROCEDURE — 3331090002 HH PPS REVENUE DEBIT

## 2019-12-03 PROCEDURE — 3331090001 HH PPS REVENUE CREDIT

## 2019-12-03 PROCEDURE — 3331090002 HH PPS REVENUE DEBIT

## 2019-12-04 ENCOUNTER — TELEPHONE (OUTPATIENT)
Dept: PALLATIVE CARE | Age: 69
End: 2019-12-04

## 2019-12-04 PROCEDURE — 3331090001 HH PPS REVENUE CREDIT

## 2019-12-04 PROCEDURE — 3331090002 HH PPS REVENUE DEBIT

## 2019-12-04 NOTE — TELEPHONE ENCOUNTER
Ms. Ayden Noe called again to see when mattress can be picked up. She needs to know day and time so she can be home. Please call to advise.

## 2019-12-04 NOTE — TELEPHONE ENCOUNTER
Ms. Vignesh Solano is calling to speak to nurse. Wants to know when air mattress will be . Advised nurse would call her back to discuss.

## 2019-12-04 NOTE — TELEPHONE ENCOUNTER
Rn has called medical modality at [phone] and left 2 messages. Called again today, left another message. Gave the phone number to Andrés Maritza Matheus as well.

## 2019-12-05 PROCEDURE — 3331090002 HH PPS REVENUE DEBIT

## 2019-12-05 PROCEDURE — 3331090001 HH PPS REVENUE CREDIT

## 2019-12-06 PROCEDURE — 3331090001 HH PPS REVENUE CREDIT

## 2019-12-06 PROCEDURE — 3331090002 HH PPS REVENUE DEBIT

## 2019-12-07 PROCEDURE — 3331090001 HH PPS REVENUE CREDIT

## 2019-12-07 PROCEDURE — 3331090002 HH PPS REVENUE DEBIT

## 2019-12-08 PROCEDURE — 3331090002 HH PPS REVENUE DEBIT

## 2019-12-08 PROCEDURE — 3331090001 HH PPS REVENUE CREDIT

## 2019-12-09 PROCEDURE — 3331090002 HH PPS REVENUE DEBIT

## 2019-12-09 PROCEDURE — 3331090001 HH PPS REVENUE CREDIT

## 2019-12-10 ENCOUNTER — TELEPHONE (OUTPATIENT)
Dept: FAMILY MEDICINE CLINIC | Age: 69
End: 2019-12-10

## 2019-12-10 PROCEDURE — 3331090002 HH PPS REVENUE DEBIT

## 2019-12-10 PROCEDURE — 3331090001 HH PPS REVENUE CREDIT

## 2019-12-10 NOTE — TELEPHONE ENCOUNTER
Espinoza Goes called and said [employer] that the air mattress is getting picked up tomorrow.  Just syl

## 2019-12-11 PROCEDURE — 3331090002 HH PPS REVENUE DEBIT

## 2019-12-11 PROCEDURE — 3331090001 HH PPS REVENUE CREDIT

## 2019-12-12 PROCEDURE — 3331090002 HH PPS REVENUE DEBIT

## 2019-12-12 PROCEDURE — 3331090001 HH PPS REVENUE CREDIT

## 2019-12-13 PROCEDURE — 3331090002 HH PPS REVENUE DEBIT

## 2019-12-13 PROCEDURE — 3331090001 HH PPS REVENUE CREDIT

## 2019-12-14 PROCEDURE — 3331090001 HH PPS REVENUE CREDIT

## 2019-12-14 PROCEDURE — 3331090002 HH PPS REVENUE DEBIT

## 2019-12-15 PROCEDURE — 3331090001 HH PPS REVENUE CREDIT

## 2019-12-15 PROCEDURE — 3331090002 HH PPS REVENUE DEBIT

## 2019-12-16 PROCEDURE — 3331090001 HH PPS REVENUE CREDIT

## 2019-12-16 PROCEDURE — 3331090002 HH PPS REVENUE DEBIT

## 2019-12-17 PROCEDURE — 3331090002 HH PPS REVENUE DEBIT

## 2019-12-17 PROCEDURE — 3331090001 HH PPS REVENUE CREDIT

## 2019-12-18 PROCEDURE — 3331090003 HH PPS REVENUE ADJ

## 2019-12-18 PROCEDURE — 3331090001 HH PPS REVENUE CREDIT

## 2019-12-18 PROCEDURE — 3331090002 HH PPS REVENUE DEBIT

## 2019-12-30 LAB
BACTERIA SPEC CULT: NORMAL
SERVICE CMNT-IMP: NORMAL

## 2020-01-10 LAB
ACID FAST STN SPEC: NEGATIVE
MYCOBACTERIUM SPEC QL CULT: NEGATIVE
SPECIMEN PREPARATION: NORMAL
SPECIMEN SOURCE: NORMAL

## 2022-01-19 NOTE — ROUTINE PROCESS
Interdisciplinary team rounds were held 5/21/2019 with the following team members:Care Management, Nursing, Pharmacy, Physical Therapy and Physician. Plan of care discussed. See clinical pathway and/or care plan for interventions and desired outcomes. Plan: PEG placed today. Will start tube feeds and monitor. Pressure injury stage 2 or greater

## 2023-06-29 NOTE — TELEPHONE ENCOUNTER
----- Message from Ector Favre, NP sent at 3/10/2019  1:08 PM EDT -----  Slight increase WBC. Monitor for s/sx of infection. Note Hep C Ab +. Wife and patient disclosed during encounter a hx of hep c and underwent previously treatment.  This is an expected finding Skyrizi Counseling: I discussed with the patient the risks of risankizumab-rzaa including but not limited to immunosuppression, and serious infections.  The patient understands that monitoring is required including a PPD at baseline and must alert us or the primary physician if symptoms of infection or other concerning signs are noted.

## 2023-08-20 NOTE — PROGRESS NOTES
Reason for Admission:   Severe sepsis RRAT Score:  9-low Plan for utilizing home health:     No recommendations at this time. Current Advanced Directive/Advance Care Plan: On file Likelihood of Readmission:   Low to moderate Transition of Care Plan:  TBD; SLP has only assessed and is recommending SNF at this time. Pt is a 78 yo male admitted on 5/11/19 for severe sepsis. Pt has quadriplegia and is unable to verbalize issues or concerns at this time. CM contacted Pt's daughter Ashli Ro: [phone] to confirm information. Pt is reported to live in a rancher home with wife and daughter with ramp at the entrance. Pt has several DME in the home a Wheelchair, a lift, and shower chair. Pt does not manage ADLS independently. Pt has care aides from [facility].  in the home 40 hrs a week. Pt has no history of SNF and has used [facility] in the past. Pt daughter anticipates Pt to come home and is not receptive to SNF at this time. Preferred pharmacy is [facility] at [address] [place] [address]. Pt has Medicare and Medicaid insurance. Pt's daughter is requesting [facility] to transport Pt home at discharge. Care Management Interventions PCP Verified by CM: [facility], [place]; No NN) Mode of Transport at Discharge: ALS([facility] ) Transition of Care Consult (CM Consult): Discharge Planning Physical Therapy Consult: No 
Occupational Therapy Consult: No 
Speech Therapy Consult: No 
Current Support Network: Lives with Spouse(Daughter and wife) Plan discussed with Pt/Family/Caregiver: Yes Discharge Location Discharge Placement: Home with home health Cordell Goetz, BS, [facility] 135

## 2023-11-20 NOTE — PROGRESS NOTES
2000 Bedside and Verbal shift change report given to Ca Carlson RN (oncoming nurse) by Jeimy Key RN (offgoing nurse). Report included the following information SBAR, Kardex, ED Summary, OR Summary, Procedure Summary, Intake/Output, MAR and Recent Results. Care plan updated on patient. Patient presented on admission with reddish purple buttocks and sacrum that is not blanching in certain areas unsure if a stage 2 or deep tissue injury wound care was consulted last night to assess patients skin. The patient has been on the turn team turning every 2 hours and is a quad. The patient has been getting zinc applied to area but otherwise open to air. In an effort to ensure that our patients [employer], a Team Member has reviewed your chart and identified an opportunity to provide the best care possible. An attempt was made to discuss or schedule overdue Preventive or Disease Management screening.     The Outcome was Contact was made, appointment scheduled. Care Gaps include Wellness Visits.

## 2024-10-04 NOTE — PROGRESS NOTES
[facility] Medical Progress Note NAME: Karen Best :  1950 MRM:  [id number] Date/Time: 2019  11:00 AM   
 
  
Assessment and Plan: Dysphagia / Quadriplegia / S/P  shunt / Flexion contractures / Sacral wound - POA, severe, chronic, but dysphagia is new or worse than baseline. Appreciate speech. Failed MBS. Now with [facility] for feedings. Re-eval Monday, and if fails again with have GI place PEG, per family wishes. Nutrition consult. Continue baclofen, oxybutynin, PPI, laxatives Nephrolithiasis / Hydronephrosis / Hematuria - POA and underlying source of sepsis. Appreciate urology input, and did successful cystoscopy with bilateral stents and stone removal . UTI (urinary tract infection), complicated, due to indwelling arias catheter / Severe sepsis / Bacteremia - Sepsis POA, urosepsis due to stone and underlying chronic arias. Ur Cx with pan sensitve E coli and proteus, blood cx with 2 species pan sensitive Proteus. Continue IV ceftriaxone, complete prior to discharge. Sepsis and bacteremia now resolved. Repeat blood cx now NGTD and now off pressors. Anemia / Thrombocytopenia - POA and worsened with hydration. Check serologies and monitor. Suspect chronic disease. Chronic myelodysplasia not out of the question Hypokalemia - Replete IV and follow labs. Subjective: Chief Complaint:  Poor speech, weak, no pain. TF running fine. ROS: 
(bold if positive, if negative) Tolerating usual minimal PT Tolerating NGT Diet Objective:  
 
Last 24hrs VS reviewed since prior progress note. Most recent are: 
 
Visit Vitals /70 (BP 1 Location: Right arm, BP Patient Position: At rest) Pulse 93 Temp 99.3 °F (37.4 °C) Resp 18 Ht 6' (1.829 m) Wt 89 kg (196 lb 3.4 oz) SpO2 93% BMI 26.61 kg/m² SpO2 Readings from Last 6 Encounters:  
19 93% 05/10/19 97% 19 98% 19 98% 19 98% 04/09/19 98% O2 Flow Rate (L/min): 2 l/min Intake/Output Summary (Last 24 hours) at 5/19/2019 1100 Last data filed at 5/19/2019 1022 Gross per 24 hour Intake 1180 ml Output 3550 ml Net -2370 ml Physical Exam: 
 
Gen:  Frail, in no acute distress HEENT:  Pink conjunctivae, PERRL, hearing intact to voice, moist mucous membranes Neck:  Supple, without masses, thyroid non-tender Resp:  No accessory muscle use, clear breath sounds without wheezes rales or rhonchi 
Card:  No murmurs, normal S1, S2 without thrills, bruits or peripheral edema Abd:  Soft, non-tender, non-distended, reduced bowel sounds are present, no mass Lymph:  No cervical or inguinal adenopathy Musc:  No cyanosis or clubbing Skin:  No rashes or ulcers, skin turgor is good Neuro:  Cranial nerves are grossly intact, complete motor weakness, follows commands vaguely Psych:  Poor insight, oriented to person, place Telemetry reviewed:   normal sinus rhythm 
__________________________________________________________________ Medications Reviewed: (see below) Medications:  
 
Current Facility-Administered Medications Medication Dose Route Frequency  baclofen (LIORESAL) tablet 2.5 mg  2.5 mg Oral TID  oxybutynin (DITROPAN) tablet 5 mg  5 mg Oral TID  zinc oxide-cod liver oil (DESITIN) 40 % paste   Topical DAILY  white petrolatum-mineral oil (EUCERIN) cream   Topical DAILY  pantoprazole (PROTONIX) 40 mg in sodium chloride 0.9% 10 mL injection  40 mg IntraVENous Q12H  
 bisacodyl (DULCOLAX) suppository 10 mg  10 mg Rectal DAILY PRN  
 cefTRIAXone (ROCEPHIN) 2 g in 0.9% sodium chloride (MBP/ADV) 50 mL  2 g IntraVENous Q24H  
 sodium chloride (NS) flush 5-10 mL  5-10 mL IntraVENous PRN  
 sodium chloride (NS) flush 5-40 mL  5-40 mL IntraVENous Q8H  
 sodium chloride (NS) flush 5-40 mL  5-40 mL IntraVENous PRN  
 acetaminophen (TYLENOL) tablet 650 mg  650 mg Oral Q4H PRN  
  naloxone (NARCAN) injection 0.4 mg  0.4 mg IntraVENous PRN  
 ondansetron (ZOFRAN) injection 4 mg  4 mg IntraVENous Q4H PRN  
 menthol-zinc oxide (CALMOSEPTINE) 0.44-20.6 % ointment 1 Each  1 Each Topical DAILY PRN  
 traMADol (ULTRAM) tablet 75 mg  75 mg Oral Q6H PRN  
 heparin (porcine) injection 5,000 Units  5,000 Units SubCUTAneous Q8H Lab Data Reviewed: (see below) Lab Review:  
 
Recent Labs 05/17/19 
0255 WBC 6.8 HGB 8.4* HCT 25.5*  
* Recent Labs 05/18/19 
0449 05/17/19 
0255  140  
K 3.8 3.1*  
 110* CO2 28 29 * 89 BUN 7 9 CREA 0.55* 0.44* CA 7.5* 7.5* MG 2.1 1.9 PHOS 2.8 2.2* ALB 1.8* 1.9* TBILI 0.4 0.4 SGOT 17 29 ALT 39 50 Lab Results Component Value Date/Time Glucose (POC) 132 (H) 05/18/2019 12:11 PM  
 Glucose (POC) 169 (H) 05/18/2019 06:58 AM  
 Glucose (POC) 123 (H) 05/17/2019 08:49 PM  
 
No results for input(s): PH, PCO2, PO2, HCO3, FIO2 in the last 72 hours. No results for input(s): INR in the last 72 hours. No lab exists for component: INREXT, INREXT All Micro Results Procedure Component Value Units Date/Time CULTURE, BLOOD [[id number]] Collected:  05/12/19 7709 Order Status:  Completed Specimen:  Blood Updated:  05/18/19 6463 Special Requests: NO SPECIAL REQUESTS Culture result: NO GROWTH 6 DAYS     
 CULTURE, BLOOD, PAIRED [[id number]]  (Abnormal)  (Susceptibility) Collected:  05/11/19 1137 Order Status:  Completed Specimen:  Blood Updated:  05/17/19 6125 Special Requests: NO SPECIAL REQUESTS Culture result:    
  PROTEUS MIRABILIS ISOLATED FROM 3 OF 4 BOTTLES DRAWN. ..CL WHITE AND CL BLUE  
     
      
  PRELIMINARY REPORT OF GRAM NEGATIVE RODS GROWING IN 3 OF 4 BOTTLES DRAWN CALLED TO AND READ BACK BY GIA KELLEY RN AT 1036 05.12.2019 LAS  
     
      
  REMAINING BOTTLE(S) HAS/HAVE NO GROWTH IN 5 DAYS CULTURE, URINE [[id number]]  (Abnormal)  (Susceptibility) Collected:  05/11/19 1137 Order Status:  Completed Specimen:  Urine from [place] Specimen Updated:  05/14/19 1304 Special Requests: NO SPECIAL REQUESTS [place] Count --     
  >100,000 COLONIES/mL Culture result:    
  PROTEUS MIRABILIS (PREDOMINATING) ESCHERICHIA COLI     
 MRSA CULTURE [[id number]] Collected:  05/11/19 1845 Order Status:  Canceled Specimen:  [place]ares CULTURE, BLOOD [[id number]] Order Status:  Canceled Specimen:  Blood URINE CULTURE HOLD SAMPLE [[id number]] Collected:  05/11/19 1137 Order Status:  Completed Specimen:  Urine from Serum Updated:  05/11/19 1141 Urine culture hold URINE ON HOLD IN MICROBIOLOGY DEPT FOR 3 DAYS. IF UNPRESERVED URINE IS SUBMITTED, IT CANNOT BE USED FOR ADDITIONAL TESTING AFTER 24 HRS, RECOLLECTION WILL BE REQUIRED. Other pertinent lab: none Total time spent with patient: 28 Minutes I reviewed chart, notes, data and current medications in the medical record. I have examined and treated the patient at bedside during this period. Care Plan discussed with: Patient, Family, Care Manager, Nursing Staff, Consultant/Specialist and >50% of time spent in counseling and coordination of care Discussed:  Care Plan and D/C Planning Prophylaxis:  H2B/PPI Disposition:   PT, OT, RN 
        
___________________________________________________ Attending Physician: Lorrie Frias MD  
  
 Ambulatory

## 2025-05-21 NOTE — TELEPHONE ENCOUNTER
Spoke with Munira Priest confirmed appt for 1/29/19 arrival timeframe between 2:30-4:30pm. Advised to have all medications out for review and let the nurse know of any refills needed. Advised to have insurance cards, photo ID, AMD, DNR, and POA documents for review. Munira Priest understood.
[Time Spent: ___ minutes] : I have spent [unfilled] minutes of time on the encounter which excludes teaching and separately reported services.

## (undated) DEVICE — CYSTO/BLADDER IRRIGATION SET, REGULATING CLAMP

## (undated) DEVICE — Device

## (undated) DEVICE — CYSTOSCOPY PACK: Brand: CONVERTORS

## (undated) DEVICE — STERILE POLYISOPRENE POWDER-FREE SURGICAL GLOVES: Brand: PROTEXIS

## (undated) DEVICE — Z DISCONTINUEDSOLUTION PREP 2OZ 10% POVIDONE IOD SCR CAP BTL

## (undated) DEVICE — 1200 GUARD II KIT W/5MM TUBE W/O VAC TUBE: Brand: GUARDIAN

## (undated) DEVICE — STERILE LATEX POWDER-FREE SURGICAL GLOVESWITH NITRILE COATING: Brand: PROTEXIS

## (undated) DEVICE — SOLIDIFIER MEDC 1200ML -- CONVERT TO 356117

## (undated) DEVICE — MARKER,SKIN,WI/RULER AND LABELS: Brand: MEDLINE

## (undated) DEVICE — OPEN-END URETERAL CATHETER: Brand: COOK

## (undated) DEVICE — BAG COLLECTION FLD OR-TBL NS --

## (undated) DEVICE — GOWN,SIRUS,FABRNF,XL,20/CS: Brand: MEDLINE

## (undated) DEVICE — CUFF RMFG BP INF SZ 11 DISP -- LAWSON OEM ITEM 238915

## (undated) DEVICE — SOLUTION IRRIGATION H2O 0797305] ICU MEDICAL INC]

## (undated) DEVICE — COVER LT HNDL PLAS RIG 1 PER PK

## (undated) DEVICE — SOLUTION SCRB 4OZ 10% PVP I POVIDONE IOD TOP PAINT EXIDINE

## (undated) DEVICE — MARKER SKN RUL VIO STRL

## (undated) DEVICE — KENDALL RADIOLUCENT FOAM MONITORING ELECTRODE -RECTANGULAR SHAPE: Brand: KENDALL

## (undated) DEVICE — DRIP REDUCTION MANIFOLD

## (undated) DEVICE — MEDI-VAC NON-CONDUCTIVE SUCTION TUBING: Brand: CARDINAL HEALTH

## (undated) DEVICE — KIT INFECTION CTRL ST FRAN --

## (undated) DEVICE — KIT COLON W/ 1.1OZ LUB AND 2 END

## (undated) DEVICE — SYR 10ML LUER LOK 1/5ML GRAD --

## (undated) DEVICE — ABDOMINAL PAD: Brand: DERMACEA

## (undated) DEVICE — SOLUTION IRRIG 3000ML 0.9% SOD CHL FLX CONT 0797208] ICU MEDICAL INC]

## (undated) DEVICE — ADULT SPO2 SENSOR: Brand: NELLCOR

## (undated) DEVICE — Y-TYPE TUR IRRIGATION SET

## (undated) DEVICE — JELLY,LUBE,STERILE,FLIP TOP,TUBE,4-OZ: Brand: MEDLINE

## (undated) DEVICE — BAG BELONG PT PERS CLEAR HANDL

## (undated) DEVICE — CATH URETH FOL 2W MED 20FRX30 -- CONVERT TO ITEM 363083

## (undated) DEVICE — BITEBLOCK ENDOSCP 60FR MAXI WHT POLYETH STURDY W/ VELC WVN

## (undated) DEVICE — PACK,CYSTOSCOPY,PK III,SIRUS: Brand: MEDLINE

## (undated) DEVICE — STRAP POS KNEE BODY VELC

## (undated) DEVICE — CATH URETH FOL CDE TIP 2W 16FR --